# Patient Record
Sex: MALE | Race: BLACK OR AFRICAN AMERICAN | NOT HISPANIC OR LATINO | Employment: OTHER | URBAN - METROPOLITAN AREA
[De-identification: names, ages, dates, MRNs, and addresses within clinical notes are randomized per-mention and may not be internally consistent; named-entity substitution may affect disease eponyms.]

---

## 2018-03-04 ENCOUNTER — HOSPITAL ENCOUNTER (EMERGENCY)
Facility: HOSPITAL | Age: 43
Discharge: HOME | End: 2018-03-04
Attending: EMERGENCY MEDICINE | Admitting: EMERGENCY MEDICINE
Payer: COMMERCIAL

## 2018-03-04 VITALS
HEART RATE: 62 BPM | TEMPERATURE: 97.5 F | WEIGHT: 160 LBS | RESPIRATION RATE: 16 BRPM | SYSTOLIC BLOOD PRESSURE: 135 MMHG | HEIGHT: 67 IN | OXYGEN SATURATION: 100 % | BODY MASS INDEX: 25.11 KG/M2 | DIASTOLIC BLOOD PRESSURE: 65 MMHG

## 2018-03-04 DIAGNOSIS — B34.9 VIRAL SYNDROME: Primary | ICD-10-CM

## 2018-03-04 LAB
ALBUMIN SERPL BCP-MCNC: 4.1 G/DL (ref 3.5–5)
ALP SERPL-CCNC: 63 U/L (ref 46–116)
ALT SERPL W P-5'-P-CCNC: 76 U/L (ref 12–78)
ANION GAP SERPL CALCULATED.3IONS-SCNC: 12 MMOL/L (ref 4–13)
AST SERPL W P-5'-P-CCNC: 27 U/L (ref 5–45)
BACTERIA UR QL AUTO: ABNORMAL /HPF
BASOPHILS # BLD AUTO: 0 THOUSANDS/ΜL (ref 0–0.1)
BASOPHILS NFR BLD AUTO: 1 % (ref 0–1)
BILIRUB SERPL-MCNC: 0.4 MG/DL (ref 0.2–1)
BILIRUB UR QL STRIP: NEGATIVE
BUN SERPL-MCNC: 15 MG/DL (ref 5–25)
CALCIUM SERPL-MCNC: 9.3 MG/DL (ref 8.3–10.1)
CHLORIDE SERPL-SCNC: 102 MMOL/L (ref 100–108)
CLARITY UR: CLEAR
CO2 SERPL-SCNC: 24 MMOL/L (ref 21–32)
COLOR UR: ABNORMAL
CREAT SERPL-MCNC: 1.06 MG/DL (ref 0.6–1.3)
EOSINOPHIL # BLD AUTO: 0 THOUSAND/ΜL (ref 0–0.61)
EOSINOPHIL NFR BLD AUTO: 0 % (ref 0–6)
ERYTHROCYTE [DISTWIDTH] IN BLOOD BY AUTOMATED COUNT: 15.2 % (ref 11.6–15.1)
GFR SERPL CREATININE-BSD FRML MDRD: 100 ML/MIN/1.73SQ M
GLUCOSE SERPL-MCNC: 97 MG/DL (ref 65–140)
GLUCOSE UR STRIP-MCNC: NEGATIVE MG/DL
HCT VFR BLD AUTO: 45.6 % (ref 42–52)
HGB BLD-MCNC: 14.4 G/DL (ref 14–18)
HGB UR QL STRIP.AUTO: NEGATIVE
KETONES UR STRIP-MCNC: ABNORMAL MG/DL
LEUKOCYTE ESTERASE UR QL STRIP: NEGATIVE
LIPASE SERPL-CCNC: 136 U/L (ref 73–393)
LYMPHOCYTES # BLD AUTO: 1.1 THOUSANDS/ΜL (ref 0.6–4.47)
LYMPHOCYTES NFR BLD AUTO: 17 % (ref 14–44)
MAGNESIUM SERPL-MCNC: 2 MG/DL (ref 1.6–2.6)
MCH RBC QN AUTO: 24.2 PG (ref 27–31)
MCHC RBC AUTO-ENTMCNC: 31.5 G/DL (ref 31.4–37.4)
MCV RBC AUTO: 77 FL (ref 82–98)
MONOCYTES # BLD AUTO: 0.5 THOUSAND/ΜL (ref 0.17–1.22)
MONOCYTES NFR BLD AUTO: 8 % (ref 4–12)
MUCOUS THREADS UR QL AUTO: ABNORMAL
NEUTROPHILS # BLD AUTO: 4.8 THOUSANDS/ΜL (ref 1.85–7.62)
NEUTS SEG NFR BLD AUTO: 74 % (ref 43–75)
NITRITE UR QL STRIP: NEGATIVE
NON-SQ EPI CELLS URNS QL MICRO: ABNORMAL /HPF
NRBC BLD AUTO-RTO: 0 /100 WBCS
PH UR STRIP.AUTO: 5.5 [PH] (ref 5–9)
PHOSPHATE SERPL-MCNC: 3.3 MG/DL (ref 2.7–4.5)
PLATELET # BLD AUTO: 291 THOUSANDS/UL (ref 130–400)
PMV BLD AUTO: 9 FL (ref 8.9–12.7)
POTASSIUM SERPL-SCNC: 4.3 MMOL/L (ref 3.5–5.3)
PROT SERPL-MCNC: 8.4 G/DL (ref 6.4–8.2)
PROT UR STRIP-MCNC: ABNORMAL MG/DL
RBC # BLD AUTO: 5.93 MILLION/UL (ref 4.7–6.1)
RBC #/AREA URNS AUTO: ABNORMAL /HPF
SODIUM SERPL-SCNC: 138 MMOL/L (ref 136–145)
SP GR UR STRIP.AUTO: >=1.03 (ref 1–1.03)
UROBILINOGEN UR QL STRIP.AUTO: 0.2 E.U./DL
WBC # BLD AUTO: 6.4 THOUSAND/UL (ref 4.8–10.8)
WBC #/AREA URNS AUTO: ABNORMAL /HPF

## 2018-03-04 PROCEDURE — 83735 ASSAY OF MAGNESIUM: CPT | Performed by: EMERGENCY MEDICINE

## 2018-03-04 PROCEDURE — 96374 THER/PROPH/DIAG INJ IV PUSH: CPT

## 2018-03-04 PROCEDURE — 80053 COMPREHEN METABOLIC PANEL: CPT | Performed by: EMERGENCY MEDICINE

## 2018-03-04 PROCEDURE — 81001 URINALYSIS AUTO W/SCOPE: CPT | Performed by: EMERGENCY MEDICINE

## 2018-03-04 PROCEDURE — 99284 EMERGENCY DEPT VISIT MOD MDM: CPT

## 2018-03-04 PROCEDURE — 36415 COLL VENOUS BLD VENIPUNCTURE: CPT | Performed by: EMERGENCY MEDICINE

## 2018-03-04 PROCEDURE — 84100 ASSAY OF PHOSPHORUS: CPT | Performed by: EMERGENCY MEDICINE

## 2018-03-04 PROCEDURE — 83690 ASSAY OF LIPASE: CPT | Performed by: EMERGENCY MEDICINE

## 2018-03-04 PROCEDURE — 85025 COMPLETE CBC W/AUTO DIFF WBC: CPT | Performed by: EMERGENCY MEDICINE

## 2018-03-04 PROCEDURE — 96361 HYDRATE IV INFUSION ADD-ON: CPT

## 2018-03-04 RX ORDER — ONDANSETRON 2 MG/ML
4 INJECTION INTRAMUSCULAR; INTRAVENOUS ONCE
Status: COMPLETED | OUTPATIENT
Start: 2018-03-04 | End: 2018-03-04

## 2018-03-04 RX ORDER — ONDANSETRON 4 MG/1
4 TABLET, ORALLY DISINTEGRATING ORAL EVERY 6 HOURS PRN
Qty: 12 TABLET | Refills: 0 | Status: SHIPPED | OUTPATIENT
Start: 2018-03-04 | End: 2018-04-30 | Stop reason: ALTCHOICE

## 2018-03-04 RX ADMIN — ONDANSETRON 4 MG: 2 INJECTION INTRAMUSCULAR; INTRAVENOUS at 08:54

## 2018-03-04 RX ADMIN — SODIUM CHLORIDE 1000 ML: 0.9 INJECTION, SOLUTION INTRAVENOUS at 11:02

## 2018-03-04 RX ADMIN — SODIUM CHLORIDE 1000 ML: 0.9 INJECTION, SOLUTION INTRAVENOUS at 08:56

## 2018-03-04 NOTE — ED PROVIDER NOTES
History  Chief Complaint   Patient presents with    Diarrhea     Pt sts sick since thursday night with diarrhea, belching and no energy  No vomiting     20-year-old male presents to the ER with acute onset subjective temperature, chills, generalized nausea, watery diarrhea since yesterday  Patient denies any sick contacts  Patient came to the ER for further evaluation  Patient denies any shortness of breath, chest pain, headaches, weakness, dizziness  Diarrhea   Associated symptoms: no abdominal pain, no arthralgias, no fever, no headaches and no vomiting        None       History reviewed  No pertinent past medical history  History reviewed  No pertinent surgical history  History reviewed  No pertinent family history  I have reviewed and agree with the history as documented  Social History   Substance Use Topics    Smoking status: Former Smoker     Types: E-Cigarettes    Smokeless tobacco: Never Used      Comment: vapes    Alcohol use Yes        Review of Systems   Constitutional: Negative for activity change, fatigue and fever  HENT: Negative for congestion, ear discharge and sore throat  Eyes: Negative for pain and redness  Respiratory: Negative for cough, chest tightness, shortness of breath and wheezing  Cardiovascular: Negative for chest pain  Gastrointestinal: Positive for diarrhea  Negative for abdominal pain, nausea and vomiting  Endocrine: Negative for cold intolerance  Genitourinary: Negative for dysuria and urgency  Musculoskeletal: Negative for arthralgias and back pain  Neurological: Negative for dizziness, weakness and headaches  Psychiatric/Behavioral: Negative for agitation and behavioral problems         Physical Exam  ED Triage Vitals [03/04/18 0839]   Temperature Pulse Respirations Blood Pressure SpO2   97 5 °F (36 4 °C) 78 16 140/74 98 %      Temp src Heart Rate Source Patient Position - Orthostatic VS BP Location FiO2 (%)   -- -- -- -- -- Pain Score       No Pain           Orthostatic Vital Signs  Vitals:    03/04/18 0839 03/04/18 1100 03/04/18 1115 03/04/18 1130   BP: 140/74 124/71  116/71   Pulse: 78  62 62       Physical Exam   Constitutional: He is oriented to person, place, and time  He appears well-developed and well-nourished  HENT:   Head: Normocephalic and atraumatic  Nose: Nose normal    Mouth/Throat: Oropharynx is clear and moist    Eyes: Conjunctivae and EOM are normal    Neck: Normal range of motion  Neck supple  Cardiovascular: Normal rate, regular rhythm and normal heart sounds  Pulmonary/Chest: Effort normal and breath sounds normal    Abdominal: Soft  Bowel sounds are normal  He exhibits no distension  There is tenderness  Generalized mild tenderness to palpation noted throughout the abdomen  Musculoskeletal: Normal range of motion  Neurological: He is alert and oriented to person, place, and time  Skin: Skin is warm  Psychiatric: He has a normal mood and affect  His behavior is normal  Judgment and thought content normal    Nursing note and vitals reviewed        ED Medications  Medications   ondansetron (ZOFRAN) injection 4 mg (4 mg Intravenous Given 3/4/18 0854)   sodium chloride 0 9 % bolus 1,000 mL (0 mL Intravenous Stopped 3/4/18 1058)   ondansetron (ZOFRAN) injection 4 mg (4 mg Intravenous Given 3/4/18 0854)   sodium chloride 0 9 % bolus 1,000 mL (1,000 mL Intravenous New Bag 3/4/18 1102)       Diagnostic Studies  Results Reviewed     Procedure Component Value Units Date/Time    Urine Microscopic [[id number]]  (Abnormal) Collected:  03/04/18 0951    Lab Status:  Final result Specimen:  Urine from Urine, Clean Catch Updated:  03/04/18 1004     RBC, UA None Seen /hpf      WBC, UA 0-1 (A) /hpf      Epithelial Cells Occasional /hpf      Bacteria, UA None Seen /hpf      MUCOUS THREADS Innumerable    UA w Reflex to Microscopic w Reflex to Culture [[id number]]  (Abnormal) Collected:  03/04/18 0951    Lab Status: Final result Specimen:  Urine from Urine, Clean Catch Updated:  03/04/18 0957     Color, UA [place]     Clarity, UA Clear     Specific Gravity, UA >=1 030     pH, UA 5 5     Leukocytes, UA Negative     Nitrite, UA Negative     Protein, UA 30 (1+) (A) mg/dl      Glucose, UA Negative mg/dl      Ketones, UA Trace (A) mg/dl      Urobilinogen, UA 0 2 E U /dl      Bilirubin, UA Negative     Blood, UA Negative    Comprehensive metabolic panel [[id number]]  (Abnormal) Collected:  03/04/18 0856    Lab Status:  Final result Specimen:  Blood from Arm, Left Updated:  03/04/18 6141     Sodium 138 mmol/L      Potassium 4 3 mmol/L      Chloride 102 mmol/L      CO2 24 mmol/L      Anion Gap 12 mmol/L      BUN 15 mg/dL      Creatinine 1 06 mg/dL      Glucose 97 mg/dL      Calcium 9 3 mg/dL      AST 27 U/L      ALT 76 U/L      Alkaline Phosphatase 63 U/L      Total Protein 8 4 (H) g/dL      Albumin 4 1 g/dL      Total Bilirubin 0 40 mg/dL      eGFR 100 ml/min/1 73sq m     Narrative:         National Kidney Disease Education Program recommendations are as follows:  GFR calculation is accurate only with a steady state creatinine  Chronic Kidney disease less than 60 ml/min/1 73 sq  meters  Kidney failure less than 15 ml/min/1 73 sq  meters      Magnesium [[id number]]  (Normal) Collected:  03/04/18 0856    Lab Status:  Final result Specimen:  Blood from Arm, Left Updated:  03/04/18 0926     Magnesium 2 0 mg/dL     Phosphorus [[id number]]  (Normal) Collected:  03/04/18 0856    Lab Status:  Final result Specimen:  Blood from Arm, Left Updated:  03/04/18 0926     Phosphorus 3 3 mg/dL     Lipase [[id number]]  (Normal) Collected:  03/04/18 0856    Lab Status:  Final result Specimen:  Blood from Arm, Left Updated:  03/04/18 0926     Lipase 136 u/L     CBC and differential [[id number]]  (Abnormal) Collected:  03/04/18 0856    Lab Status:  Final result Specimen:  Blood from Arm, Left Updated:  03/04/18 0921     WBC 6 40 Thousand/uL      RBC 5 93 Million/uL Hemoglobin 14 4 g/dL      Hematocrit 45 6 %      MCV 77 (L) fL      MCH 24 2 (L) pg      MCHC 31 5 g/dL      RDW 15 2 (H) %      MPV 9 0 fL      Platelets 536 Thousands/uL      nRBC 0 /100 WBCs      Neutrophils Relative 74 %      Lymphocytes Relative 17 %      Monocytes Relative 8 %      Eosinophils Relative 0 %      Basophils Relative 1 %      Neutrophils Absolute 4 80 Thousands/µL      Lymphocytes Absolute 1 10 Thousands/µL      Monocytes Absolute 0 50 Thousand/µL      Eosinophils Absolute 0 00 Thousand/µL      Basophils Absolute 0 00 Thousands/µL                  No orders to display              Procedures  Procedures       Phone Contacts  ED Phone Contact    ED Course  ED Course as of Mar 04 1224   Yelitza Solitario Mar 04, 2018   1047 Patient re-examined at bedside  Patient continues to feel mild weakness  Nausea is improved  Will give another L of fluid and p o  challenge  1218 Patient re-examinedAt bedside  Patient started to feel better after 2nd leader of IV fluid  Patient tolerated ginger ale and crackers in the ER  Patient would like to go home  MDM  Number of Diagnoses or Management Options  Viral syndrome: new and requires workup  Diagnosis management comments: Obtain blood work, UA  Give IV fluids, antiemetics and reassess symptoms  Amount and/or Complexity of Data Reviewed  Clinical lab tests: ordered and reviewed  Tests in the medicine section of CPT®: ordered and reviewed  Independent visualization of images, tracings, or specimens: yes    Risk of Complications, Morbidity, and/or Mortality  General comments: No acute abnormalities noted on lab lab work  Patient given 2 L IV fluid bolus as well as Zofran which improved his symptoms  Patient tolerated ginger ale and crackers in the ER  At this point patient's symptoms are consistent with viral syndrome  Discussed supportive care and BRAT diet  Patient is discharged home on p r n   NSAIDs and Zofran for his symptoms as well as follow-up to PCP  Close return instructions given to return to the ER for any worsening symptoms  Patient agrees with discharge plan  Patient well appearing at time of discharge  Patient Progress  Patient progress: improved    CritCare Time    Disposition  Final diagnoses:   Viral syndrome     Time reflects when diagnosis was documented in both MDM as applicable and the Disposition within this note     Time User Action Codes Description Comment    3/4/2018 12:18 PM Celeste Awad Add [B34 9] Viral syndrome       ED Disposition     ED Disposition Condition Comment    Discharge  Edwin Lovell discharge to home/self care  Condition at discharge: Good        Follow-up Information     Follow up With Specialties Details Why Vladislav 81  In 3 days Please follow up with her own family doctor or call the number below if you do not have a family doctor  [address], [place] Urology  To discuss any urinary difficulty  [address]  [phone]          Patient's Medications   Discharge Prescriptions    ONDANSETRON (ZOFRAN-ODT) 4 MG DISINTEGRATING TABLET    Take 1 tablet (4 mg total) by mouth every 6 (six) hours as needed for nausea or vomiting       Start Date: 3/4/2018  End Date: --       Order Dose: 4 mg       Quantity: 12 tablet    Refills: 0     No discharge procedures on file      ED Provider  Electronically Signed by           Romie Anthony DO  03/04/18 0491

## 2018-03-04 NOTE — DISCHARGE INSTRUCTIONS
Please take a list of all of your medications and discharge paperwork with you to all of your follow-up medical visits  Please take all of your medications as directed  Please call your family doctor or return to the ER if you have increased shortness of breath, chest pain, fevers, chills, nausea, vomiting, diarrhea, or any other worsening symptoms  Viral Syndrome, Ambulatory Care   GENERAL INFORMATION:   Viral syndrome  is a term healthcare providers use for general symptoms of a viral infection that has no clear cause  Common symptoms include the following:   · Fever and chills, or a rash    · Runny or stuffy nose     · Cough, sore throat, or hoarseness     · Headache, or pain and pressure around your eyes     · Muscle aches and joint pain     · Shortness of breath or wheezing     · Abdominal pain, cramps, and diarrhea     · Nausea, vomiting, or loss of appetite  Seek immediate care for the following symptoms:   · Continued vomiting and diarrhea    · Chest pain or trouble breathing  Treatment for a viral syndrome  may include medicines to decrease fever, cough, or stuffy nose  You may also need medicines to relieve a rash, itching, or help treat the viral infection  Manage your symptoms:  Drink liquids as directed to help prevent dehydration  Ask how much liquid to drink each day and which liquids are best for you  You may need to drink an oral rehydration solution (ORS)  An ORS has the right amounts of water, salts, and sugar you need to replace body fluids  Prevent the spread of viral syndrome:   · Wash your hands often  Use soap and water  Wash your hands after you use the bathroom, change a child's diapers, or sneeze  Wash your hands before you prepare or eat food  Use a gel hand  if soap and water are not available  · Wear a mask  to help prevent spreading the virus to others  · Cook and handle food properly  Cook food completely through   Clean food preparation surfaces with a disinfectant  · Ask about vaccinations  You may need an influenza (flu) vaccine, pneumococcal vaccine, or meningococcal vaccine  These vaccines help prevent the flu, pneumonia, and meningococcal disease  Follow up with your healthcare provider as directed:  Write down your questions so you remember to ask them during your visits  CARE AGREEMENT:   You have the right to help plan your care  Learn about your health condition and how it may be treated  Discuss treatment options with your caregivers to decide what care you want to receive  You always have the right to refuse treatment  The above information is an  only  It is not intended as medical advice for individual conditions or treatments  Talk to your doctor, nurse or pharmacist before following any medical regimen to see if it is safe and effective for you  © 2014 [address] is for End User's use only and may not be sold, redistributed or otherwise used for commercial purposes  All illustrations and images included in CareNotes® are the copyrighted property of [employer]  or Colton Huang

## 2018-04-30 ENCOUNTER — OFFICE VISIT (OUTPATIENT)
Dept: FAMILY MEDICINE CLINIC | Facility: CLINIC | Age: 43
End: 2018-04-30
Payer: COMMERCIAL

## 2018-04-30 VITALS
HEIGHT: 70 IN | TEMPERATURE: 97.2 F | SYSTOLIC BLOOD PRESSURE: 132 MMHG | DIASTOLIC BLOOD PRESSURE: 80 MMHG | BODY MASS INDEX: 24.2 KG/M2 | HEART RATE: 72 BPM | OXYGEN SATURATION: 100 % | WEIGHT: 169 LBS

## 2018-04-30 DIAGNOSIS — Z23 NEED FOR TDAP VACCINATION: ICD-10-CM

## 2018-04-30 DIAGNOSIS — R53.83 OTHER FATIGUE: ICD-10-CM

## 2018-04-30 DIAGNOSIS — M54.5 LOW BACK PAIN, UNSPECIFIED BACK PAIN LATERALITY, UNSPECIFIED CHRONICITY, WITH SCIATICA PRESENCE UNSPECIFIED: ICD-10-CM

## 2018-04-30 DIAGNOSIS — M25.512 LEFT SHOULDER PAIN, UNSPECIFIED CHRONICITY: ICD-10-CM

## 2018-04-30 DIAGNOSIS — Z00.00 HEALTH CARE MAINTENANCE: Primary | ICD-10-CM

## 2018-04-30 DIAGNOSIS — Z13.220 SCREENING FOR LIPID DISORDERS: ICD-10-CM

## 2018-04-30 LAB
SL AMB  POCT GLUCOSE, UA: NORMAL
SL AMB LEUKOCYTE ESTERASE,UA: NORMAL
SL AMB POCT BILIRUBIN,UA: NORMAL
SL AMB POCT BLOOD,UA: NORMAL
SL AMB POCT CLARITY,UA: CLEAR
SL AMB POCT COLOR,UA: YELLOW
SL AMB POCT KETONES,UA: NORMAL
SL AMB POCT NITRITE,UA: NORMAL
SL AMB POCT PH,UA: 6
SL AMB POCT SPECIFIC GRAVITY,UA: 1.02
SL AMB POCT URINE PROTEIN: NORMAL
SL AMB POCT UROBILINOGEN: NORMAL

## 2018-04-30 PROCEDURE — 90471 IMMUNIZATION ADMIN: CPT

## 2018-04-30 PROCEDURE — 99396 PREV VISIT EST AGE 40-64: CPT | Performed by: FAMILY MEDICINE

## 2018-04-30 PROCEDURE — 81002 URINALYSIS NONAUTO W/O SCOPE: CPT | Performed by: FAMILY MEDICINE

## 2018-04-30 PROCEDURE — 90715 TDAP VACCINE 7 YRS/> IM: CPT

## 2018-05-01 NOTE — PROGRESS NOTES
Assessment/Plan:    No problem-specific Assessment & Plan notes found for this encounter  Diagnoses and all orders for this visit:    Health care maintenance    Other fatigue  -     Lyme Antibody Profile with reflex to WB  -     TSH, 3rd generation with T4 reflex    Screening for lipid disorders  -     Lipid panel    Left shoulder pain, unspecified chronicity  -     Ambulatory referral to Physical Therapy; Future    Low back pain, unspecified back pain laterality, unspecified chronicity, with sciatica presence unspecified  -     Ambulatory referral to Physical Therapy; Future  -     POCT urine dip  -     Urine culture  -     Chlamydia/GC amplified DNA by PCR    Need for Tdap vaccination  -     TDAP VACCINE GREATER THAN OR EQUAL TO 6YO IM          Prostate Cancer Screening:  Risks and Benefits discussed    Testicular Cancer Screening:  Risks and Benefits discussed    Colorectal Cancer Screening:  Risks and Benefits discussed      Preventing Counseling:  Advice and education were given regarding nutrition, aerobic exercises, weight bearing exercises, cardiovascular risk reduction, fall risk reduction, and age appropriate supplements  The patient was counseled regarding instructions for management, risk factor reductions, prognosis, risks and benefits of treatment options, patient and family education, and importance of compliance with treatment  No Follow-up on file  Subjective:      Patient ID: Misa Maravilla is a 44 y o  male      Visit Type: Health Maintenance    General Health:     Dental Regular visits: No    Vision Problems: No    Hearing loss: No    Life Style  Healthy Diet:  Can be improve, try to be healthy  Regular Exercise: No  Weight Concerns: No  Tobacco Use: e-cigaret  Alcohol Use: yes regularly  Drug Use: No      Reproductive Health  Sexually Active: Yes  Contraception: No        Chief Complaint   Patient presents with    Physical Exam       81-year-old male comes in for complete physical, he stated he is feeling great except feeling very tired, he stated he sleeps 8-9 hours a day, does not snore, have left shoulder pain sometimes and bilateral lower back pain, he stated he has this trouble for while, deny trauma deny injury he stated he doesn't recall when he started, Patient was recently in the ER had a blood work done all came back within normal range, the only thing that was not done was cholesterol profile, Patient during regularly and not willing to quit        The following portions of the patient's history were reviewed and updated as appropriate: allergies, current medications, past family history, past medical history, past social history, past surgical history and problem list       Review of Systems   Constitutional: Negative for activity change, appetite change, chills, diaphoresis, fever and unexpected weight change  (+) fatigue  HENT: Negative for congestion, dental problem, drooling, ear discharge, ear pain, facial swelling, hearing loss, mouth sores, nosebleeds, postnasal drip, rhinorrhea, sinus pain and sinus pressure  Eyes: Negative for photophobia, pain, discharge, redness, itching and visual disturbance  Respiratory: Negative for apnea, cough, choking, chest tightness, shortness of breath, wheezing and stridor  Cardiovascular: Negative for chest pain, palpitations and leg swelling  Gastrointestinal: Negative for abdominal distention, abdominal pain, anal bleeding, blood in stool, constipation, diarrhea, nausea, rectal pain and vomiting  Genitourinary: Negative for decreased urine volume, difficulty urinating, dyspareunia, dysuria, flank pain, hematuria and pelvic pain  Musculoskeletal: Negative for  gait problem, joint swelling, myalgias, neck pain and neck stiffness  (+) arthralgias, (+)back pain,  Skin: Negative for color change, pallor, rash and wound     Neurological: Negative for dizziness, tremors, seizures, syncope, facial asymmetry, speech difficulty, weakness, light-headedness, numbness and headaches  Psychiatric/Behavioral: Negative for agitation, behavioral problems, confusion, decreased concentration, dysphoric mood, hallucinations, sleep disturbance and suicidal ideas  The patient is not nervous/anxious  Objective:    History   Smoking Status    Former Smoker    Types: E-Cigarettes   Smokeless Tobacco    Never Used     Comment: vapes       Allergies: No Known Allergies    Vitals:  /80   Pulse 72   Temp (!) 97 2 °F (36 2 °C) (Tympanic)   Ht 5' 9 5" (1 765 m)   Wt 76 7 kg (169 lb)   SpO2 100%   BMI 24 60 kg/m²     No current outpatient prescriptions on file  No current facility-administered medications for this visit  Physical Exam   Constitutional:  oriented to person, place, and time  well-developed and well-nourished  No distress  HENT:  Normocephalic and atraumatic, Oropharynx is clear and moist  No oropharyngeal exudate  Conjunctivae and EOM are normal  Pupils are equal, round, and reactive to light  No scleral icterus  Neck: Normal range of motion  Neck supple  No JVD present  No thyromegaly present  Cardiovascular: Normal rate, regular rhythm, normal heart sounds and intact distal pulses  Exam reveals no gallop and no friction rub  No murmur heard  Pulmonary/Chest: Effort normal and breath sounds normal  No respiratory distress  no wheezes  no rales  no tenderness  Abdominal: Soft  Bowel sounds are normal  no distension and no mass  There is no tenderness  There is no rebound and no guarding  Musculoskeletal: Normal range of motion, no edema, or deformity  B/L Lumbar muscle tenderness  Neurological: alert and oriented to person, place, and time  normal reflexes  No cranial nerve deficit  normal muscle tone  Coordination normal    Skin: Skin is warm and dry  No rash noted  not diaphoretic  No erythema  Psychiatric:normal mood and affect   behavior is normal  Judgment and thought content normal    Nursing note and vitals reviewed            Results Reviewed     None        PHQ9 REVIEWED  Immunization status: dTap

## 2018-05-02 LAB
BACTERIA UR CULT: NORMAL
C TRACH RRNA SPEC QL NAA+PROBE: NEGATIVE
Lab: NO GROWTH
N GONORRHOEA RRNA SPEC QL NAA+PROBE: NEGATIVE

## 2018-05-23 ENCOUNTER — EVALUATION (OUTPATIENT)
Dept: PHYSICAL THERAPY | Facility: CLINIC | Age: 43
End: 2018-05-23
Payer: COMMERCIAL

## 2018-05-23 DIAGNOSIS — M54.5 LOW BACK PAIN, UNSPECIFIED BACK PAIN LATERALITY, UNSPECIFIED CHRONICITY, WITH SCIATICA PRESENCE UNSPECIFIED: Primary | ICD-10-CM

## 2018-05-23 DIAGNOSIS — M25.512 LEFT SHOULDER PAIN, UNSPECIFIED CHRONICITY: ICD-10-CM

## 2018-05-23 PROCEDURE — G8979 MOBILITY GOAL STATUS: HCPCS

## 2018-05-23 PROCEDURE — 97161 PT EVAL LOW COMPLEX 20 MIN: CPT

## 2018-05-23 PROCEDURE — G8978 MOBILITY CURRENT STATUS: HCPCS

## 2018-05-23 NOTE — PROGRESS NOTES
PT Evaluation     Today's date: 2018  Patient name: Oma Evans  : 1975  MRN: [id number]  Referring provider: Nesha Rogers MD  Dx:   Encounter Diagnosis     ICD-10-CM    1  Low back pain, unspecified back pain laterality, unspecified chronicity, with sciatica presence unspecified M54 5 Ambulatory referral to Physical Therapy   2  Left shoulder pain, unspecified chronicity M25 512 Ambulatory referral to Physical Therapy                  Assessment  Impairments: abnormal gait, abnormal or restricted ROM, activity intolerance, impaired balance, impaired physical strength, lacks appropriate home exercise program, pain with function and weight-bearing intolerance    Assessment details: Oma Evans is a 44 y o  male who presents to physical therapy with the follow objective deficits secondary to Low back pain, unspecified back pain laterality, unspecified chronicity, with sciatica presence unspecified  (primary encounter diagnosis)  Left shoulder pain, unspecified chronicity:  -Type I dysfunction lower thoracic upper lumbar to left  -posture dysfunction  -Type II dysfunction ERSL lower upper lumbar  -reduced flexibility bilateral LE  -symptoms with + response to repeated extension in lying  Pt would benefit from PT services to address these impairments and maximize function  PLOC and goals were discussed and agreed upon with patient      Understanding of Dx/Px/POC: good   Prognosis: good    Goals  Short term goals:    + Patient will have pain level 3/10 lumbar spine at rest(2-3 weeks)  + Patient will report a 40% improvement in symptoms (2-3 weeks)  + Patient will be independent in basic HEP 2-3 weeks   + Patient will demonstrate appropriate hip, knee, and ankle alignment with functional activities (2-3 weeks)   + patient will demonstrate proper sitting posture with verbal cuing no tactile cuing (2-3 weeks)   + patient will demonstrate no pain with sideglide bilaterally  2 weeks   + patient will demonstrate no Type I dysfunction in upper lumbar and lower thoracic (2-3 sessions)    Long term goals:  +  Patient will be independent in a comprehensive home exercise program (4-6 weeks)  +  Patient will have proper posture in clinic without  (4-6 weeks    +  Patient will report  80% improvement improvement in symptoms (4-6 weeks)  +  Patient will have full range of motion lumbar spine painfree all directiions (4-6 weeks)  +  Patient will have pain level 1/10,  lumbar spine w/ adl's (4-6 weeks)  + Patient will increase FOTO score to 65 (8 sessions)  + patient will demonstrate proper lifting mechanics independently  (4-6 weeks)  + patient will demonstrate no Type I dysfunction in upper lumbar and lower thoracic  (4-6 weeks)  + patient will report no functional limitations (4 weeks)                        Plan  Patient would benefit from: skilled PT  Planned therapy interventions: abdominal trunk stabilization, joint mobilization, manual therapy, balance, balance/weight bearing training, neuromuscular re-education, patient education, postural training, strengthening, stretching, therapeutic exercise, flexibility, gait training, functional ROM exercises, therapeutic training, home exercise program, graded exercise, body mechanics training and breathing training  Frequency: 2x week  Duration in visits: 12  Duration in weeks: 6  Treatment plan discussed with: patient  Plan details:  HEP development, stretching LE especially Hamstring, strengthening  TA and paraspinals , A/AA/PROM prn, joint mobilizations/MET to reduce Type I/II dysfunction, posture education, STM/MI as needed to reduce muscle tension left paraspinals, muscle reeducation as needed, Amaris prn PLOC discussed and agreed upon with patient  When symtpoms have decreased with assess left shoulder pain          Subjective Evaluation    History of Present Illness  Mechanism of injury: Patient reports low back pain began ~ 2-3 years ago: no specific incident  Patient reports intermittent pain over past few years  Now symptoms are more constant  Patient reports occasionally using back brace which is helpful  Denies radicular symptoms: and paraesthesias  Shoulder: pain with driving and sustained in a statis position  Pain with reaching and lifting overhead  Shoulder pain has been present for 3-4 years as well  Function:LBP: worse with sitting (no limitations), driving is worse( no limitations), difficulty removing over the range microwaves  Shoulder: worse w/ driving and sitting still for a while  reaching and lifting overhead  Recurrent probem    Quality of life: good    Pain  Current pain ratin (lumbar)  At best pain ratin (lumbar)  At worst pain ratin (lumbar)  Location: see above: lumbar  Quality: dull ache and sharp  Aggravating factors: sitting  Progression: no change    Social Support  Steps to enter house: yes  5  Stairs in house: yes   5  Lives in: multiple-level home  Lives with: spouse    Employment status: working ( and caregiver)  Hand dominance: right  Exercise history: none      Diagnostic Tests  No diagnostic tests performed  Treatments  No previous or current treatments  Patient Goals  Patient goals for therapy: decreased pain and independence with ADLs/IADLs  Patient goal: to"make sure my back is ok due to family has history of back problems"         Objective     General Comments     Lumbar Comments  Posture  Sitting:significantly slouched and in posterior pelvic tilt and forward head and shoulders     Standing: Type II curve concave to left    Gait: wnl no gait deviations    Palpation: + tenderness in area of L5/S1 spinous process     Functional movements  Squat: wnl no pain: increased forward translation of knees over toes  SLS: left: 40  sec Right : 40 sec   Bridging: lifts about 50% of motion, needs cuing to achieve full motion: no increase in low back pain    Dermatomes: NT    Myotomes  Hip, knee and ankle 4/5: able to walk on heels and toes         Lumbar ROM  Flexion: wnl + muscle bellies more prominent on  Left with forward flexion  Extension: + pain in standing, prone: full ROM no pain  Side glide: Right: wnl reduced pain, left: wnl no effect on symptoms  Type 2 dysfunction: found in flexion: ERSL: corrected with MET     Repeated motions  KALEB: increase pain NWAR   REIL: prone: no pain before or after press ups  SG Right: reduces pain NBAR    Flexibility   Hamstring: Right: fair + Left: fair  Hip flexors:Right: Nt Left: Nt    Quads: Right: fair +  Left: fair +     Slump test:  Right: Nt Left: Nt        Precautions standard    Specialty Daily Treatment Diary      Patient Goal:  to"make sure my back is ok due to family has history of back problems"   Manual 5/23/18       STM left lumbar paraspinals NV       L/S p-a mobs  NV       Man Flexibility hamstring        MET Type II dysfunction: upper lumbar performed         Total time:  5 min               Exercise Diary         Rec bike/treadmill/  elliptical         TA activation        Ball squeeze        Clamshells        LTR        Bridges                Sitting posture instruct       Prone press ups instruct       EIS instruct               Type II dysfunction  Concave on left: hip hike on left NV       sidelying leg lift off side of table on right and lift NV                       Total time:                 Modalities        Ice        MH        Total time:

## 2018-05-29 ENCOUNTER — OFFICE VISIT (OUTPATIENT)
Dept: PHYSICAL THERAPY | Facility: CLINIC | Age: 43
End: 2018-05-29
Payer: COMMERCIAL

## 2018-05-29 DIAGNOSIS — M54.5 LOW BACK PAIN, UNSPECIFIED BACK PAIN LATERALITY, UNSPECIFIED CHRONICITY, WITH SCIATICA PRESENCE UNSPECIFIED: ICD-10-CM

## 2018-05-29 DIAGNOSIS — M25.512 LEFT SHOULDER PAIN, UNSPECIFIED CHRONICITY: Primary | ICD-10-CM

## 2018-05-29 PROCEDURE — 97110 THERAPEUTIC EXERCISES: CPT

## 2018-05-29 NOTE — LETTER
August 15, 2018    [address]  [address] [facility]    Patient: Miguel Ángel Gutierrez   YOB: 1975   Date of Visit: 2018     Encounter Diagnosis     ICD-10-CM    1  Left shoulder pain, unspecified chronicity M25 512    2  Low back pain, unspecified back pain laterality, unspecified chronicity, with sciatica presence unspecified M54 5        Dear Dr Julio Cesar Kaplan:    Please review the attached addendum from [facility] recent visit  If you have any questions or concerns, please don't hesitate to call  Sincerely,    Ping Peoples, PT                        Ashley Hoskins MD  [employer]  [address] [place] [id number]  VIA In Basket          Daily Note     Today's date: 2018  Patient name: Miguel Ángel Gutierrez  : 1975  MRN: [id number]  Referring provider: Mona Bauer MD  Dx:   Encounter Diagnosis     ICD-10-CM    1  Left shoulder pain, unspecified chronicity M25 512    2  Low back pain, unspecified back pain laterality, unspecified chronicity, with sciatica presence unspecified M54 5                   Subjective: Beverley Barbour reports that his pain level entering today 6/10    States he has not had a chance to perform HEP    Objective: See treatment diary below  Precautions standard    Specialty Daily Treatment Diary      Patient Goal:  to"make sure my back is ok due to family has history of back problems"   Manual 18      STM left lumbar paraspinals NV       L/S p-a mobs  NV performed        Man Flexibility hamstring        MET Type II dysfunction: upper lumbar  performed   FRSR assessment/ MET correction   7 sec x 3      Total time:  5 min 7 min              Exercise Diary         Rec bike/treadmill/  elliptical   nustep lv 3 5 min       TA activation        Ball squeeze        Clamshells        LTR        Bridges  5 sec 10 x              Sitting posture instruct reviewed      Prone press ups instruct 2x10        EIS instruct 2 x5              Type I dysfunction  Concave on left: hip hike on left NV Not present      sidelying leg lift off side of table on right and lift NV Not performed          Hip hinge: 10 x         Sit/stand: 10 x 2  from chair      Total time:   20 min (1:1)              Modalities        Ice        MH        Total time:  Not performed              Assessment: able to perform press up pain free  + pain with sit/stand transfers  + FRSR noted at ~ L4, no type II dysfunction noted this am  Patient had good response to repeated press p  Was able to perform hip hinge and sit/stand with increased cuing: but when performed properly, patient was pain free  Patient exited with no pain  Plan: Progress treatment as tolerated  continue to assess Type I and II dysfunction      8/15/18: on 7/9/18:  spoke with patient due to patient not attending scheduled appointments  Patient stated he was in the process of changing insurances and would call to reschedule appointments  As of todays date, patient has not contacted clinic therefore, PT services are being discharged  D/C status and goal status is unknown

## 2018-05-29 NOTE — PROGRESS NOTES
Daily Note     Today's date: 2018  Patient name: Margaux Powell  : 1975  MRN: [id number]  Referring provider: Makenna Sunshine MD  Dx:   Encounter Diagnosis     ICD-10-CM    1  Left shoulder pain, unspecified chronicity M25 512    2  Low back pain, unspecified back pain laterality, unspecified chronicity, with sciatica presence unspecified M54 5                   Subjective: Ronel Rowan reports that his pain level entering today 6/10   States he has not had a chance to perform HEP    Objective: See treatment diary below  Precautions standard    Specialty Daily Treatment Diary      Patient Goal:  to"make sure my back is ok due to family has history of back problems"   Manual 18      STM left lumbar paraspinals NV       L/S p-a mobs  NV performed        Man Flexibility hamstring        MET Type II dysfunction: upper lumbar  performed   FRSR assessment/ MET correction   7 sec x 3      Total time:  5 min 7 min              Exercise Diary         Rec bike/treadmill/  elliptical   nustep lv 3 5 min       TA activation        Ball squeeze        Clamshells        LTR        Bridges  5 sec 10 x              Sitting posture instruct reviewed      Prone press ups instruct 2x10        EIS instruct 2 x5              Type I dysfunction  Concave on left: hip hike on left NV Not present      sidelying leg lift off side of table on right and lift NV Not performed          Hip hinge: 10 x         Sit/stand: 10 x 2  from chair      Total time:   20 min (1:1)              Modalities        Ice        MH        Total time:  Not performed              Assessment: able to perform press up pain free  + pain with sit/stand transfers  + FRSR noted at ~ L4, no type II dysfunction noted this am  Patient had good response to repeated press p  Was able to perform hip hinge and sit/stand with increased cuing: but when performed properly, patient was pain free  Patient exited with no pain      Plan: Progress treatment as tolerated  continue to assess Type I and II dysfunction      8/15/18: on 7/9/18:  spoke with patient due to patient not attending scheduled appointments  Patient stated he was in the process of changing insurances and would call to reschedule appointments  As of todays date, patient has not contacted clinic therefore, PT services are being discharged  D/C status and goal status is unknown

## 2018-07-09 ENCOUNTER — TELEPHONE (OUTPATIENT)
Dept: PHYSICAL THERAPY | Facility: CLINIC | Age: 43
End: 2018-07-09

## 2018-07-09 NOTE — TELEPHONE ENCOUNTER
kacie bridges phoned patient due to lack of attendance in physical therapy  Patient stated he was in the process of switching insurances   Will call when he is ready to return to PT

## 2018-09-24 ENCOUNTER — CLINICAL SUPPORT (OUTPATIENT)
Dept: FAMILY MEDICINE CLINIC | Facility: CLINIC | Age: 43
End: 2018-09-24
Payer: COMMERCIAL

## 2018-09-24 DIAGNOSIS — Z11.1 PPD SCREENING TEST: Primary | ICD-10-CM

## 2018-09-24 PROCEDURE — 99211 OFF/OP EST MAY X REQ PHY/QHP: CPT

## 2018-09-24 PROCEDURE — 86580 TB INTRADERMAL TEST: CPT

## 2018-09-26 ENCOUNTER — CLINICAL SUPPORT (OUTPATIENT)
Dept: FAMILY MEDICINE CLINIC | Facility: CLINIC | Age: 43
End: 2018-09-26
Payer: COMMERCIAL

## 2018-09-26 DIAGNOSIS — Z11.1 PPD SCREENING TEST: Primary | ICD-10-CM

## 2018-09-26 LAB
INDURATION: 0 MM
TB SKIN TEST: NEGATIVE

## 2018-09-26 PROCEDURE — 86580 TB INTRADERMAL TEST: CPT | Performed by: FAMILY MEDICINE

## 2018-10-11 ENCOUNTER — OFFICE VISIT (OUTPATIENT)
Dept: FAMILY MEDICINE CLINIC | Facility: CLINIC | Age: 43
End: 2018-10-11
Payer: COMMERCIAL

## 2018-10-11 VITALS
RESPIRATION RATE: 16 BRPM | DIASTOLIC BLOOD PRESSURE: 72 MMHG | WEIGHT: 165.2 LBS | BODY MASS INDEX: 24.05 KG/M2 | HEART RATE: 60 BPM | SYSTOLIC BLOOD PRESSURE: 110 MMHG | TEMPERATURE: 97.8 F

## 2018-10-11 DIAGNOSIS — R82.90 URINE ABNORMALITY: ICD-10-CM

## 2018-10-11 DIAGNOSIS — Z71.2 ENCOUNTER TO DISCUSS TEST RESULTS: Primary | ICD-10-CM

## 2018-10-11 PROCEDURE — 99213 OFFICE O/P EST LOW 20 MIN: CPT | Performed by: FAMILY MEDICINE

## 2018-10-11 NOTE — PROGRESS NOTES
Assessment:      Encounter to discuss test results    Urine Abnormality     Plan:  Plan:     · Counseled patient on normal test results  · Discussed possible prostate etiology, however patient declines genital exam, rectal exam or any further workup  · Offered referral to urology, however patient does not want to be scoped, so he declined the referral as well  · Patient will follow up as needed  All patient questions & concerns were addressed  The patient agrees with his treatment plan  RTO prn  Subjective:      Adams Man is a 44 y o  male who complains of uncomfortable urination with straining for several months  He is here to discuss test results ordered on a previous visit for this complaint  Patient denies dysuria, hematuria, hesitancy, weak stream, urgency, incontinence, pubic pain, or any discharge  He also denies back pain, congestion, cough, fever, headache, rhinitis, sorethroat and stomach ache  Patient does not have a history of recurrent UTI  Patient does not have a history of pyelonephritis  The patient previously had labwork done, including urine dip, culture, and GC/Chlamydia test  All of these were negative  He seems primarily concerned with the test results and denies any other complaints  The following portions of the patient's history were reviewed and updated as appropriate: allergies, current medications, past family history, past medical history, past social history, past surgical history and problem list         Review of Systems  Pertinent items are noted in HPI        Objective:      /72   Pulse 60   Temp 97 8 °F (36 6 °C)   Resp 16   Wt 74 9 kg (165 lb 3 2 oz)   BMI 24 05 kg/m²   General: alert and oriented, in no acute distress and cooperative   Abdomen: soft, non-tender, without masses or organomegaly     Back: spine nontender, CVA tenderness absent   : defer exam       Stevenson Cooks, DO  10/12/18  11:53 AM    Some portions of this record may have been generated with voice recognition software  There may be translation, syntax, or grammatical errors  Occasional wrong word or "sound-a-like" substitutions may have occurred due to the inherent limitations of the voice recognition software  Read the chart carefully and recognize, using context, where substations may have occurred   If you have any questions, please contact the dictating provider for clarification or correction, as needed

## 2019-01-27 ENCOUNTER — OFFICE VISIT (OUTPATIENT)
Dept: URGENT CARE | Facility: CLINIC | Age: 44
End: 2019-01-27
Payer: COMMERCIAL

## 2019-01-27 VITALS
BODY MASS INDEX: 26.34 KG/M2 | RESPIRATION RATE: 18 BRPM | WEIGHT: 167.8 LBS | HEART RATE: 76 BPM | TEMPERATURE: 98.5 F | SYSTOLIC BLOOD PRESSURE: 120 MMHG | HEIGHT: 67 IN | OXYGEN SATURATION: 99 % | DIASTOLIC BLOOD PRESSURE: 70 MMHG

## 2019-01-27 DIAGNOSIS — H10.33 ACUTE CONJUNCTIVITIS OF BOTH EYES, UNSPECIFIED ACUTE CONJUNCTIVITIS TYPE: Primary | ICD-10-CM

## 2019-01-27 DIAGNOSIS — J06.9 VIRAL URI: ICD-10-CM

## 2019-01-27 PROCEDURE — 99213 OFFICE O/P EST LOW 20 MIN: CPT | Performed by: PHYSICIAN ASSISTANT

## 2019-01-27 RX ORDER — TOBRAMYCIN 3 MG/ML
1 SOLUTION/ DROPS OPHTHALMIC
Qty: 5 ML | Refills: 0 | Status: SHIPPED | OUTPATIENT
Start: 2019-01-27 | End: 2020-02-23

## 2019-01-27 RX ORDER — LANOLIN ALCOHOL/MO/W.PET/CERES
1 CREAM (GRAM) TOPICAL 3 TIMES DAILY
COMMUNITY
End: 2020-11-22

## 2019-01-27 RX ORDER — ELECTROLYTES/DEXTROSE
SOLUTION, ORAL ORAL DAILY
COMMUNITY
End: 2020-11-22

## 2019-01-27 NOTE — PATIENT INSTRUCTIONS
Conjunctivitis   WHAT YOU SHOULD KNOW:   Conjunctivitis, or pink eye, is inflammation of your conjunctiva  The conjunctiva is a thin tissue that covers the front of your eye and the back of your eyelids  The conjunctiva helps protect your eye and keep it moist         INSTRUCTIONS:   Medicines:   · Allergy medicine: This medicine helps decrease itchy, red, swollen eyes caused by allergies  It may be given as a pill, eye drops, or nasal spray  · Antibiotics:  You will need antibiotics if your conjunctivitis is caused by bacteria  This medicine may be given as eye drops or eye ointment  · Steroid medicine: This medicine helps decrease inflammation  It may be given as a pill, eye drops, or nasal spray  · Take your medicine as directed  Call your healthcare provider if you think your medicine is not helping or if you have side effects  Tell him if you are allergic to any medicine  Keep a list of the medicines, vitamins, and herbs you take  Include the amounts, and when and why you take them  Bring the list or the pill bottles to follow-up visits  Carry your medicine list with you in case of an emergency  Follow up with your primary healthcare provider as directed: You may need to return for more tests on your eyes  These will help your primary healthcare provider check for eye damage  Write down your questions so you remember to ask them during your visits  Avoid the spread of conjunctivitis:   · Wash your hands often:  Wash your hands before you touch your eyes  Also wash your hands before you prepare or eat food and after you use the bathroom or change a diaper  · Avoid allergens:  Try to avoid the things that cause your allergies, such as pets, dust, or grass  · Avoid contact:  Do not share towels or washcloths  Try to stay away from others as much as possible  Ask when you can return to work or school  · Throw away eye makeup:  Throw away mascara and other eye makeup    Manage your symptoms:  · Apply a cool compress:  Wet a washcloth with cold water and place it on your eye  This will help decrease swelling  · Use eye drops:  Eye drops, or artificial tears, can be bought without a doctor's order  They help keep your eye moist     · Do not wear contact lenses: They can irritate your eye  Throw away the pair you are using and ask when you can wear them again  Use a new pair of lenses when your primary healthcare provider says it is okay  · Flush your eye:  You may need to flush your eye with saline to help decrease your symptoms  Ask for more information on how to flush your eye  Contact your primary healthcare provider if:   · Your eyesight becomes blurry  · You have tiny bumps or spots of blood on your eye  · You have questions or concerns about your condition or care  Return to the emergency department if:   · The swelling in your eye gets worse, even after treatment  · Your vision suddenly becomes worse or you cannot see at all  · Your eye begins to bleed  © 2014 [address] is for End User's use only and may not be sold, redistributed or otherwise used for commercial purposes  All illustrations and images included in CareNotes® are the copyrighted property of [employer]  or [employer]  The above information is an  only  It is not intended as medical advice for individual conditions or treatments  Talk to your doctor, nurse or pharmacist before following any medical regimen to see if it is safe and effective for you  Upper Respiratory Infection   WHAT YOU NEED TO KNOW:   An upper respiratory infection is also called the common cold  It is an infection that can affect your nose, throat, ears, and sinuses  For healthy people, the common cold is usually not serious and does not need special treatment  Cold symptoms are usually worst for the first 3 to 5 days  Most people get better in 7 to 14 days   You may continue to cough for 2 to 3 weeks  Colds are caused by viruses and do not get better with antibiotics  DISCHARGE INSTRUCTIONS:   Return to the emergency department if:   · You have chest pain or trouble breathing  Contact your healthcare provider if:   · You have a fever over 102ºF (39°C)  · Your sore throat gets worse or you see white or yellow spots in your throat  · Your symptoms get worse after 3 to 5 days or your cold is not better in 14 days  · You have a rash anywhere on your skin  · You have large, tender lumps in your neck  · You have thick, green or yellow drainage from your nose  · You cough up thick yellow, green, or bloody mucus  · You have vomiting for more than 24 hours and cannot keep fluids down  · You have a bad earache  · You have questions or concerns about your condition or care  Medicines: You may need any of the following:  · Decongestants  help reduce nasal congestion and help you breathe more easily  If you take decongestant pills, they may make you feel restless or cause problems with your sleep  Do not use decongestant sprays for more than a few days  · Cough suppressants  help reduce coughing  Ask your healthcare provider which type of cough medicine is best for you  · NSAIDs , such as ibuprofen, help decrease swelling, pain, and fever  NSAIDs can cause stomach bleeding or kidney problems in certain people  If you take blood thinner medicine, always ask your healthcare provider if NSAIDs are safe for you  Always read the medicine label and follow directions  · Acetaminophen  decreases pain and fever  It is available without a doctor's order  Ask how much to take and how often to take it  Follow directions  Read the labels of all other medicines you are using to see if they also contain acetaminophen, or ask your doctor or pharmacist  Acetaminophen can cause liver damage if not taken correctly   Do not use more than 4 grams (4,000 milligrams) total of acetaminophen in one day  · Take your medicine as directed  Contact your healthcare provider if you think your medicine is not helping or if you have side effects  Tell him or her if you are allergic to any medicine  Keep a list of the medicines, vitamins, and herbs you take  Include the amounts, and when and why you take them  Bring the list or the pill bottles to follow-up visits  Carry your medicine list with you in case of an emergency  Follow up with your healthcare provider as directed:  Write down your questions so you remember to ask them during your visits  Self-care:   · Rest as much as possible  Slowly start to do more each day  · Drink more liquids as directed  Liquids will help thin and loosen mucus so you can cough it up  Liquids will also help prevent dehydration  Liquids that help prevent dehydration include water, fruit juice, and broth  Do not drink liquids that contain caffeine  Caffeine can increase your risk for dehydration  Ask your healthcare provider how much liquid to drink each day  · Soothe a sore throat  Gargle with warm salt water  This helps your sore throat feel better  Make salt water by dissolving ¼ teaspoon salt in 1 cup warm water  You may also suck on hard candy or throat lozenges  You may use a sore throat spray  · Use a humidifier or vaporizer  Use a cool mist humidifier or a vaporizer to increase air moisture in your home  This may make it easier for you to breathe and help decrease your cough  · Use saline nasal drops as directed  These help relieve congestion  · Apply petroleum-based jelly around the outside of your nostrils  This can decrease irritation from blowing your nose  · Do not smoke  Nicotine and other chemicals in cigarettes and cigars can make your symptoms worse  They can also cause infections such as bronchitis or pneumonia  Ask your healthcare provider for information if you currently smoke and need help to quit  E-cigarettes or smokeless tobacco still contain nicotine  Talk to your healthcare provider before you use these products  Prevent spreading your cold to others:   · Try to stay away from other people during the first 2 to 3 days of your cold when it is more easily spread  · Do not share food or drinks  · Do not share hand towels with household members  · Wash your hands often, especially after you blow your nose  Turn away from other people and cover your mouth and nose with a tissue when you sneeze or cough  © 2017 [address] [facility] Information is for End User's use only and may not be sold, redistributed or otherwise used for commercial purposes  All illustrations and images included in CareNotes® are the copyrighted property of [employer] M [employer] Inc  or [employer]  The above information is an  only  It is not intended as medical advice for individual conditions or treatments  Talk to your doctor, nurse or pharmacist before following any medical regimen to see if it is safe and effective for you

## 2019-01-27 NOTE — LETTER
January 27, 2019     Patient: Garrett Segovia   YOB: 1975   Date of Visit: 1/27/2019       To Whom it May Concern:    Garrett Segovia was seen in my clinic on 1/27/2019  He is being treated for acute bilateral conjunctivitis and is considered contagious for the first 24 hours while on drops  If you have any questions or concerns, please don't hesitate to call           Sincerely,          Elayne Bernal PA-C        CC: No Recipients

## 2019-01-27 NOTE — PROGRESS NOTES
[facility]        NAME: Daniel Sow is a 44 y o  male  : 1975    MRN: [id number]  DATE: 2019  TIME: 11:06 AM    Assessment and Plan   Acute conjunctivitis of both eyes, unspecified acute conjunctivitis type [H10 33]  1  Acute conjunctivitis of both eyes, unspecified acute conjunctivitis type  tobramycin (TOBREX) 0 3 % SOLN   2  Viral URI           Patient Instructions     Discussed condition with pt  He has B/L conjunctivitis which I will treat with Tobrex drops and reviewed precautions regarding pinkeye as well as need to apply frequent warm compresses to the eyelids  He also has an acute viral URI  I rec hydration, rest, discussed OTC cold meds, continuing Mucinex, and observation  Follow up with PCP in 3-5 days  Proceed to  ER if symptoms worsen  Chief Complaint     Chief Complaint   Patient presents with    Conjunctivitis     x 2 days - b/l eye redness, swelling of upper and lower lids with green exudate and crustiness  Has URI with nasal congestion  and sore throat - no cough  History of Present Illness       Pt presents with 2 day history of B/L eye irritation, crusting, drainage, itchy eyes  Denies FB/trauma, visual changes  Does not wear contact lenses  He also reports for 1+ week history of cold symptoms including nasal congestion, ST, occ chills but no fever, cough, N/V/D  He has taken Mucinex  Denies hx of asthma/allergies  He vapes  Does not receive the flu shot  Review of Systems   Review of Systems   Constitutional: Negative  HENT: Positive for congestion and sore throat  Eyes: Positive for discharge, redness and itching  Negative for photophobia, pain and visual disturbance  Pertinent positives are B/L    Respiratory: Negative  Cardiovascular: Negative  Gastrointestinal: Negative  Genitourinary: Negative            Current Medications       Current Outpatient Prescriptions:     glucosamine-chondroitin 500-400 MG tablet, Take 1 tablet by mouth 3 (three) times a day, Disp: , Rfl:     Multiple Vitamins-Minerals (EYE VITAMINS PO), Take by mouth daily, Disp: , Rfl:     Multiple Vitamins-Minerals (HAIR SKIN AND NAILS FORMULA PO), Take by mouth daily, Disp: , Rfl:     Multiple Vitamins-Minerals (MULTIVITAMIN ADULT) TABS, Take by mouth daily, Disp: , Rfl:     tobramycin (TOBREX) 0 3 % SOLN, Administer 1 drop to both eyes every 4 (four) hours while awake, Disp: 5 mL, Rfl: 0    Current Allergies     Allergies as of 01/27/2019    (No Known Allergies)            The following portions of the patient's history were reviewed and updated as appropriate: allergies, current medications, past family history, past medical history, past social history, past surgical history and problem list      Past Medical History:   Diagnosis Date    Anemia        Past Surgical History:   Procedure Laterality Date    NO PAST SURGERIES         No family history on file  Medications have been verified  Objective   /70 (BP Location: Left arm, Patient Position: Sitting, Cuff Size: Standard)   Pulse 76   Temp 98 5 °F (36 9 °C) (Tympanic)   Resp 18   Ht 5' 7" (1 702 m)   Wt 76 1 kg (167 lb 12 8 oz)   SpO2 99%   BMI 26 28 kg/m²        Physical Exam     Physical Exam   Constitutional: He is oriented to person, place, and time  He appears well-developed and well-nourished  No distress  HENT:   Right Ear: Hearing, tympanic membrane, external ear and ear canal normal    Left Ear: Hearing, tympanic membrane, external ear and ear canal normal    Nose: Mucosal edema (B/L boggy erythematous turbinates) present  Mouth/Throat: Mucous membranes are normal  Posterior oropharyngeal erythema (PND) present  No oropharyngeal exudate  Eyes:   B/L conjunctival injection, mild lid edema, crusting on lid margins, and watery discharge  Mild photophobia noted  No FB noted on flipping of lids  Neck: Neck supple     Cardiovascular: Normal rate, regular rhythm and normal heart sounds  Pulmonary/Chest: Effort normal and breath sounds normal    Lymphadenopathy:     He has no cervical adenopathy  Neurological: He is alert and oriented to person, place, and time  Psychiatric: He has a normal mood and affect  Vitals reviewed

## 2019-02-17 ENCOUNTER — HOSPITAL ENCOUNTER (EMERGENCY)
Facility: HOSPITAL | Age: 44
Discharge: HOME | End: 2019-02-17
Attending: EMERGENCY MEDICINE | Admitting: EMERGENCY MEDICINE
Payer: COMMERCIAL

## 2019-02-17 VITALS
TEMPERATURE: 97.8 F | BODY MASS INDEX: 25.11 KG/M2 | WEIGHT: 160 LBS | RESPIRATION RATE: 18 BRPM | HEIGHT: 67 IN | DIASTOLIC BLOOD PRESSURE: 69 MMHG | OXYGEN SATURATION: 99 % | SYSTOLIC BLOOD PRESSURE: 132 MMHG | HEART RATE: 60 BPM

## 2019-02-17 DIAGNOSIS — J02.9 PHARYNGITIS: Primary | ICD-10-CM

## 2019-02-17 LAB — S PYO AG THROAT QL: NEGATIVE

## 2019-02-17 PROCEDURE — 86308 HETEROPHILE ANTIBODY SCREEN: CPT | Performed by: EMERGENCY MEDICINE

## 2019-02-17 PROCEDURE — 87430 STREP A AG IA: CPT | Performed by: EMERGENCY MEDICINE

## 2019-02-17 PROCEDURE — 99283 EMERGENCY DEPT VISIT LOW MDM: CPT

## 2019-02-17 PROCEDURE — 36415 COLL VENOUS BLD VENIPUNCTURE: CPT | Performed by: EMERGENCY MEDICINE

## 2019-02-17 RX ADMIN — LIDOCAINE HYDROCHLORIDE 10 ML: 20 SOLUTION ORAL; TOPICAL at 20:41

## 2019-02-17 NOTE — ED PROVIDER NOTES
History  Chief Complaint   Patient presents with    Sore Throat     Patient comes today due to sore throat that has been ongoing for some time,has been using chloroseptic lozengers and was seen a few weeks ago by an urgent care for same but throat is still hurting     Patient states he has been no for about a month  He had a sore throat which was associated with bilateral conjunctivitis and nasal congestion  He was seen at a urgent center diagnosed with viral syndrome and released  Patient states that is conjunctivitis has improved however he continues with a sore throat and painful swallowing  He has not had a fever  He has no cough  Patient states his significant other developed a sore throat but she cleared up quickly  He has no trouble speaking and no voice change          Prior to Admission Medications   Prescriptions Last Dose Informant Patient Reported? Taking? Multiple Vitamins-Minerals (EYE VITAMINS PO)   Yes Yes   Sig: Take by mouth daily   Multiple Vitamins-Minerals (HAIR SKIN AND NAILS FORMULA PO)   Yes Yes   Sig: Take by mouth daily   Multiple Vitamins-Minerals (MULTIVITAMIN ADULT) TABS   Yes Yes   Sig: Take by mouth daily   glucosamine-chondroitin 500-400 MG tablet   Yes Yes   Sig: Take 1 tablet by mouth 3 (three) times a day   tobramycin (TOBREX) 0 3 % SOLN   No Yes   Sig: Administer 1 drop to both eyes every 4 (four) hours while awake      Facility-Administered Medications: None       Past Medical History:   Diagnosis Date    Anemia        Past Surgical History:   Procedure Laterality Date    NO PAST SURGERIES         History reviewed  No pertinent family history  I have reviewed and agree with the history as documented  Social History     Tobacco Use    Smoking status: Current Every Day Smoker     Types: E-Cigarettes    Smokeless tobacco: Never Used    Tobacco comment: vapes   Substance Use Topics    Alcohol use:  Yes     Alcohol/week: 0 6 oz     Types: 1 Standard drinks or equivalent per week    Drug use: No        Review of Systems   Constitutional: Negative for chills and fever  HENT: Positive for congestion and sore throat  Negative for trouble swallowing and voice change  Eyes: Negative for visual disturbance  Respiratory: Negative for cough  Cardiovascular: Negative for chest pain  Gastrointestinal: Negative for abdominal pain  Genitourinary: Negative for dysuria  Musculoskeletal: Negative for arthralgias and back pain  Skin: Negative for rash  Neurological: Negative for dizziness, light-headedness and headaches  Hematological: Does not bruise/bleed easily  Psychiatric/Behavioral: Negative for confusion  All other systems reviewed and are negative  Physical Exam  Physical Exam   Constitutional: He is oriented to person, place, and time  He appears well-developed and well-nourished  HENT:   Head: Normocephalic and atraumatic  Right Ear: Tympanic membrane and ear canal normal    Left Ear: Tympanic membrane and ear canal normal    Mouth/Throat: Mucous membranes are normal  Posterior oropharyngeal erythema present  No oropharyngeal exudate  Tonsils are 1+ on the right  Tonsils are 1+ on the left  No tonsillar exudate  Eyes: Pupils are equal, round, and reactive to light  EOM are normal    Neck: Normal range of motion  Neck supple  Cardiovascular: Normal rate, regular rhythm and normal heart sounds  Pulmonary/Chest: Effort normal and breath sounds normal    Abdominal: Soft  Lymphadenopathy:     He has no cervical adenopathy  Neurological: He is alert and oriented to person, place, and time  Skin: Skin is warm and dry  Psychiatric: He has a normal mood and affect  His behavior is normal    Nursing note and vitals reviewed        Vital Signs  ED Triage Vitals [02/17/19 1850]   Temperature Pulse Respirations Blood Pressure SpO2   97 8 °F (36 6 °C) 60 18 132/69 99 %      Temp Source Heart Rate Source Patient Position - Orthostatic VS BP Location FiO2 (%)   Tympanic Monitor Sitting Right arm --      Pain Score       --           Vitals:    02/17/19 1850   BP: 132/69   Pulse: 60   Patient Position - Orthostatic VS: Sitting       Visual Acuity      ED Medications  Medications   al mag oxide-diphenhydramine-lidocaine viscous (MAGIC MOUTHWASH) suspension 10 mL (has no administration in time range)       Diagnostic Studies  Results Reviewed     Procedure Component Value Units Date/Time    Rapid Strep A Screen Only, Adults [[id number]]  (Normal) Collected:  02/17/19 1954    Lab Status:  Final result Specimen:  Throat Updated:  02/17/19 2006     Rapid Strep A Screen Negative    Mononucleosis screen [[id number]] Collected:  02/17/19 1954    Lab Status: In process Specimen:  Blood from Arm, Left Updated:  02/17/19 1957                 No orders to display              Procedures  Procedures       Phone Contacts  ED Phone Contact    ED Course                               MDM  Number of Diagnoses or Management Options  Diagnosis management comments: Patient's history of strep as a child  Signs and symptoms are more consistent with viral infection however  I will also check for EBV      Disposition  Final diagnoses:   Pharyngitis     Time reflects when diagnosis was documented in both MDM as applicable and the Disposition within this note     Time User Action Codes Description Comment    2/17/2019  8:32 PM Paul Cornelius Add [J02 9] Pharyngitis       ED Disposition     ED Disposition Condition Date/Time Comment    Discharge Stable Sun Feb 17, 2019  8:32 PM Ayaka Vidal discharge to home/self care              Follow-up Information     Follow up With Specialties Details Why Contact Tyron Martinez MD Family Medicine Schedule an appointment as soon as possible for a visit in 1 day As needed [employer] [place] [facility]  [address] [facility]  [phone]            Patient's Medications   Discharge Prescriptions    AL MAG OXIDE-DIPHENHYDRAMINE-LIDOCAINE VISCOUS (MAGIC MOUTHWASH) 1:1:1 SUSPENSION    Swish and spit 10 mL every 4 (four) hours as needed for mouth pain or discomfort       Start Date: 2/17/2019 End Date: --       Order Dose: 10 mL       Quantity: 180 mL    Refills: 0     No discharge procedures on file      ED Provider  Electronically Signed by           Rebecca Draper MD  02/17/19 9966

## 2019-02-18 LAB — HETEROPH AB SER QL: NEGATIVE

## 2019-05-16 ENCOUNTER — OFFICE VISIT (OUTPATIENT)
Dept: FAMILY MEDICINE CLINIC | Facility: CLINIC | Age: 44
End: 2019-05-16
Payer: COMMERCIAL

## 2019-05-16 VITALS
SYSTOLIC BLOOD PRESSURE: 130 MMHG | DIASTOLIC BLOOD PRESSURE: 70 MMHG | TEMPERATURE: 97.6 F | BODY MASS INDEX: 26.44 KG/M2 | OXYGEN SATURATION: 99 % | WEIGHT: 168.8 LBS | RESPIRATION RATE: 18 BRPM | HEART RATE: 75 BPM

## 2019-05-16 DIAGNOSIS — S63.502A SPRAIN OF LEFT WRIST, INITIAL ENCOUNTER: Primary | ICD-10-CM

## 2019-05-16 PROCEDURE — 99213 OFFICE O/P EST LOW 20 MIN: CPT | Performed by: FAMILY MEDICINE

## 2019-07-16 ENCOUNTER — OFFICE VISIT (OUTPATIENT)
Dept: FAMILY MEDICINE CLINIC | Facility: CLINIC | Age: 44
End: 2019-07-16
Payer: COMMERCIAL

## 2019-07-16 VITALS
OXYGEN SATURATION: 99 % | WEIGHT: 163 LBS | HEART RATE: 79 BPM | SYSTOLIC BLOOD PRESSURE: 110 MMHG | BODY MASS INDEX: 25.58 KG/M2 | DIASTOLIC BLOOD PRESSURE: 80 MMHG | HEIGHT: 67 IN

## 2019-07-16 DIAGNOSIS — M19.039 WRIST ARTHRITIS: ICD-10-CM

## 2019-07-16 DIAGNOSIS — M24.831: ICD-10-CM

## 2019-07-16 DIAGNOSIS — M24.832: Primary | ICD-10-CM

## 2019-07-16 PROCEDURE — 3008F BODY MASS INDEX DOCD: CPT | Performed by: FAMILY MEDICINE

## 2019-07-16 PROCEDURE — 99213 OFFICE O/P EST LOW 20 MIN: CPT | Performed by: FAMILY MEDICINE

## 2019-07-16 NOTE — PROGRESS NOTES
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  At this time, his right wrist is more painful than the left  The following portions of the patient's history were reviewed and updated as appropriate: allergies, current medications, past family history, past medical history, past social history, past surgical history and problem list     Review of Systems  Pertinent items are noted in HPI       Objective     /80   Pulse 79   Ht 5' 7" (1 702 m)   Wt 73 9 kg (163 lb)   SpO2 99%   BMI 25 53 kg/m²   Right wrist:  radial pulse normal, sensation normal, negative Phalen, negative Tinel, normal ipsilateral elbow and ROM is full but painful  Crepitus noted with ROM  No warmth or nodules  Vasculature and sensation intact   and finger adduction/abduction strength normal   Left wrist:  radial pulse normal, sensation normal, negative Phalen, negative Tinel and normal ipsilateral elbow and ROM is full but painful  Crepitus noted with ROM  No warmth or nodules  Vasculature and sensation intact   and finger adduction/abduction strength normal       Assessment/Plan     Crepitus of bilateral wrists  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    · Natural history and expected course discussed  Questions answered  · Resr, ice, compression, and elevation (RICE) therapy  · Plain film x-rays per orders    · OTC analgesics as needed  · Orthopedics referral     All patient questions & concerns were addressed  The patient agrees with his treatment plan  RTO luis fernando Hall,   07/21/19  1:43 PM    Some portions of this record may have been generated with voice recognition software  There may be translation, syntax, or grammatical errors  Occasional wrong word or "sound-a-like" substitutions may have occurred due to the inherent limitations of the voice recognition software  Read the chart carefully and recognize, using context, where substations may have occurred   If you have any questions, please contact the dictating provider for clarification or correction, as needed

## 2019-07-22 ENCOUNTER — TRANSCRIBE ORDERS (OUTPATIENT)
Dept: ADMINISTRATIVE | Facility: HOSPITAL | Age: 44
End: 2019-07-22

## 2019-07-22 ENCOUNTER — HOSPITAL ENCOUNTER (OUTPATIENT)
Dept: RADIOLOGY | Facility: HOSPITAL | Age: 44
Discharge: HOME | End: 2019-07-22
Payer: COMMERCIAL

## 2019-07-22 DIAGNOSIS — M24.832: ICD-10-CM

## 2019-07-22 DIAGNOSIS — M24.831: ICD-10-CM

## 2019-07-22 DIAGNOSIS — M19.039 WRIST ARTHRITIS: ICD-10-CM

## 2019-07-22 PROCEDURE — 73110 X-RAY EXAM OF WRIST: CPT

## 2019-07-23 ENCOUNTER — CONSULT (OUTPATIENT)
Dept: OBGYN CLINIC | Facility: CLINIC | Age: 44
End: 2019-07-23
Payer: COMMERCIAL

## 2019-07-23 VITALS
HEART RATE: 52 BPM | HEIGHT: 68 IN | SYSTOLIC BLOOD PRESSURE: 128 MMHG | BODY MASS INDEX: 24.67 KG/M2 | WEIGHT: 162.8 LBS | DIASTOLIC BLOOD PRESSURE: 88 MMHG

## 2019-07-23 DIAGNOSIS — M19.039 WRIST ARTHRITIS: ICD-10-CM

## 2019-07-23 DIAGNOSIS — M24.832: ICD-10-CM

## 2019-07-23 DIAGNOSIS — M24.831: ICD-10-CM

## 2019-07-23 DIAGNOSIS — S69.81XA INJURY OF TRIANGULAR FIBROCARTILAGE COMPLEX (TFCC) OF RIGHT WRIST, INITIAL ENCOUNTER: Primary | ICD-10-CM

## 2019-07-23 PROCEDURE — 99243 OFF/OP CNSLTJ NEW/EST LOW 30: CPT | Performed by: ORTHOPAEDIC SURGERY

## 2019-07-23 NOTE — LETTER
July 23, 2019     DO Suleman Dyson [id number]    Patient: Philly Prasad   YOB: 1975   Date of Visit: 7/23/2019       Dear Dr Enrrique Robertson:    Thank you for referring Philly Prasad to me for evaluation  Below are the relevant portions of my assessment and plan of care  I discussed with May Lima today that his signs and symptoms are consistent with a right wrist TFCC sprain  He is tender to palpation over the TFCC  Treatment options were discussed in the form of bracing for the next 6 weeks  He was agreeable to this  He was fitted and provided with a cock-up wrist brace  A steroid injection was discussed however he deferred this today  He will follow up in 6 weeks for repeat evaluation  If you have questions, please do not hesitate to call me  I look forward to following May Lima along with you           Sincerely,        Celestino Merritt DO        CC: No Recipients

## 2019-07-23 NOTE — PROGRESS NOTES
Assessment/Plan:  1  Injury of triangular fibrocartilage complex (TFCC) of right wrist, initial encounter     2  Crepitus of joint of left wrist  Ambulatory referral to Orthopedic Surgery   3  Crepitus of joint of right wrist  Ambulatory referral to Orthopedic Surgery   4  Wrist arthritis  Ambulatory referral to Orthopedic Surgery       Scribe Attestation    I,:   Inocencia Casey MA am acting as a scribe while in the presence of the attending physician :        I,:   Jenn De La Torre DO personally performed the services described in this documentation    as scribed in my presence :              I discussed with Ubaldo Vu today that his signs and symptoms are consistent with a right wrist TFCC sprain  He is tender to palpation over the TFCC  Treatment options were discussed in the form of bracing for the next 6 weeks  He was agreeable to this  He was fitted and provided with a cock-up wrist brace  A steroid injection was discussed however he deferred this today  He will follow up in 6 weeks for repeat evaluation  Subjective:   Amaya Olsen is a 38 y o  male who presents to the office today as a referral from his PCP for evaluation of bilateral wrist pain  Patient states this has been ongoing for the past 2-3 months  Patient states it initially started in his left wrist but that has since resolved  His main complaint is pain to the right wrist  Patient notes pain to the volar and dorsal aspect of his right wrist  He states this is increased with range of motion and throwing  He notes increased pain in the push up position  Patient states he notes a clicking at times as well  Patient was provided wrist braces by his PCP which he states he has been compliant with  He denies any numbness or tingling  Review of Systems   Constitutional: Positive for unexpected weight change  Negative for chills and fever  HENT: Positive for sore throat  Negative for drooling and sneezing      Eyes: Positive for pain and visual disturbance  Negative for redness  Respiratory: Negative for cough and wheezing  Gastrointestinal: Negative for nausea and vomiting  Musculoskeletal: Positive for arthralgias  Negative for joint swelling and myalgias  Neurological: Positive for headaches  Negative for weakness and numbness  Psychiatric/Behavioral: Negative for behavioral problems  The patient is not nervous/anxious  Past Medical History:   Diagnosis Date    Anemia        Past Surgical History:   Procedure Laterality Date    NO PAST SURGERIES         Family History   Problem Relation Age of Onset    No Known Problems Mother     No Known Problems Father        Social History     Occupational History    Not on file   Tobacco Use    Smoking status: Current Every Day Smoker     Types: E-Cigarettes    Smokeless tobacco: Never Used    Tobacco comment: vapes   Substance and Sexual Activity    Alcohol use: Yes     Alcohol/week: 1 0 standard drinks     Types: 1 Standard drinks or equivalent per week     Comment: Occ      Drug use: No    Sexual activity: Not on file         Current Outpatient Medications:     Ascorbic Acid (VITAMIN C PO), Take by mouth, Disp: , Rfl:     Multiple Vitamins-Minerals (EYE VITAMINS PO), Take by mouth daily, Disp: , Rfl:     Multiple Vitamins-Minerals (HAIR SKIN AND NAILS FORMULA PO), Take by mouth daily, Disp: , Rfl:     Multiple Vitamins-Minerals (MULTIVITAMIN ADULT) TABS, Take by mouth daily, Disp: , Rfl:     Omega-3 Fatty Acids (FISH OIL PO), Take by mouth, Disp: , Rfl:     al mag oxide-diphenhydramine-lidocaine viscous (MAGIC MOUTHWASH) 1:1:1 suspension, Swish and spit 10 mL every 4 (four) hours as needed for mouth pain or discomfort (Patient not taking: Reported on 7/23/2019), Disp: 180 mL, Rfl: 0    glucosamine-chondroitin 500-400 MG tablet, Take 1 tablet by mouth 3 (three) times a day, Disp: , Rfl:     tobramycin (TOBREX) 0 3 % SOLN, Administer 1 drop to both eyes every 4 (four) hours while awake (Patient not taking: Reported on 5/16/2019), Disp: 5 mL, Rfl: 0    No Known Allergies    Objective:  Vitals:    07/23/19 0816   BP: 128/88   Pulse: (!) 52       Ortho Exam     Left wrist    Full ROM  DURJ stable pronation and supination   NTTP TFCC  Compartments soft  Brisk capillary refill  Sensation intact median, radial, and ulnar nerve    Right wrist    Ext 90  Flex 80  15 ulnar and radial deviation  Full pronation and supination  - tinel's  - claude's   NTTP FCR tendon  NTTP ECU   NTTP scaphoid   DURJ stable pronation and supination   TTP TFCC  Compartments soft  Brisk capillary refill  Sensation intact median, radial, and ulnar nerve     Physical Exam   Constitutional: He is oriented to person, place, and time  He appears well-developed and well-nourished  HENT:   Head: Normocephalic and atraumatic  Eyes: Conjunctivae are normal  Right eye exhibits no discharge  Left eye exhibits no discharge  Neck: Normal range of motion  Neck supple  Cardiovascular: Normal rate and intact distal pulses  Pulmonary/Chest: Effort normal  No respiratory distress  Musculoskeletal:   As noted in HPI   Neurological: He is alert and oriented to person, place, and time  Skin: Skin is warm and dry  Psychiatric: He has a normal mood and affect  His behavior is normal  Judgment and thought content normal        I have personally reviewed pertinent films in PACS and my interpretation is as follows:X-ray bilateral wrist performed on 7/22/19 demonstrates no osseous abnormalities

## 2019-10-23 ENCOUNTER — OFFICE VISIT (OUTPATIENT)
Dept: FAMILY MEDICINE CLINIC | Facility: CLINIC | Age: 44
End: 2019-10-23
Payer: COMMERCIAL

## 2019-10-23 VITALS
SYSTOLIC BLOOD PRESSURE: 120 MMHG | HEIGHT: 68 IN | RESPIRATION RATE: 18 BRPM | HEART RATE: 62 BPM | DIASTOLIC BLOOD PRESSURE: 72 MMHG | BODY MASS INDEX: 25.01 KG/M2 | WEIGHT: 165 LBS

## 2019-10-23 DIAGNOSIS — Z72.0 NICOTINE ABUSE: ICD-10-CM

## 2019-10-23 DIAGNOSIS — Z00.00 ANNUAL PHYSICAL EXAM: Primary | ICD-10-CM

## 2019-10-23 DIAGNOSIS — R39.12 WEAK URINARY STREAM: ICD-10-CM

## 2019-10-23 DIAGNOSIS — Z20.2 EXPOSURE TO STD: ICD-10-CM

## 2019-10-23 DIAGNOSIS — Z23 NEED FOR INFLUENZA VACCINATION: ICD-10-CM

## 2019-10-23 PROBLEM — E63.9 NUTRITION IMPAIRED DUE TO IMBALANCE OF NUTRIENTS: Status: ACTIVE | Noted: 2019-10-23

## 2019-10-23 PROCEDURE — 99396 PREV VISIT EST AGE 40-64: CPT | Performed by: FAMILY MEDICINE

## 2019-10-23 NOTE — PROGRESS NOTES
[address] [facility]    NAME: Ashley Carrillo  AGE: 38 y o  SEX: male  : 1975     DATE: 10/23/2019     Assessment and Plan:     Problem List Items Addressed This Visit        Other    Nicotine abuse      Other Visit Diagnoses     Annual physical exam    -  Primary    Relevant Orders    Lipid panel    Basic metabolic panel    Hemoglobin A1C    CBC and Platelet    Need for influenza vaccination        BMI 25 0-25 9,adult        Weak urinary stream        Relevant Orders    UA (URINE) with reflex to Scope  -Prostate exam negative for nodules, negative for enlargement    Exposure to STD        Relevant Orders    Chlamydia/GC amplified DNA by PCR        Neck/ occipital pain: on phsyical seems to be focused over Trapezius muscle with point tenderness  Patient advised to apply warm compresses and attempt if possible to decrease workload  Immunizations and preventive care screenings were discussed with patient today  Appropriate education was printed on patient's after visit summary  Counseling:  Alcohol/drug use: discussed moderation in alcohol intake, the recommendations for healthy alcohol use, and avoidance of illicit drug use  Patient reports drinking half a pint of cognac about 3 times a weeek  · Exercise: the importance of regular exercise/physical activity was discussed  Recommend exercise 3-5 times per week for at least 30 minutes  · Nicotine dependence: Patient was counseled on the use of Vape and nicotine dependence  BMI Counseling: Body mass index is 25 09 kg/m²  The BMI is above normal  Nutrition recommendations include encouraging healthy choices of fruits and vegetables and decreasing fast food intake  No follow-ups on file       Chief Complaint:     Chief Complaint   Patient presents with    Physical Exam      History of Present Illness:     Adult Annual Physical   Patient here for a comprehensive physical exam  The patient reports on review that he gets headaches from time to time which are located at his occiput, exacerbated sometimes when he turns his head or leans head down  No relieving factors, no radiation  Patient stated it's hard to describe, like a headache  Diet and Physical Activity  · Diet/Nutrition: poor diet and limited fruits/vegetables  · Exercise: no formal exercise  Maybe once a weeks does 20 pushups and situps     Depression Screening  PHQ-9 Depression Screening    PHQ-9:    Frequency of the following problems over the past two weeks:       Little interest or pleasure in doing things:  0 - not at all  Feeling down, depressed, or hopeless:  0 - not at all  PHQ-2 Score:  0       General Health  · Sleep: gets 7-8 hours of sleep on average  · Hearing: normal - bilateral   · Vision: wears glasses and due for an eye exam and patient says he patient reports he will schedule one soon  · Dental: regular dental visits, brushes teeth twice daily and does not floss   Health  · Symptoms include: weak urinary stream     Review of Systems:     Review of Systems   Constitutional: Negative for fever  HENT: Negative for sore throat  Eyes: Negative for pain and visual disturbance  Respiratory: Positive for shortness of breath (With lots of work and 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)  Negative for cough  Cardiovascular: Negative for chest pain  Gastrointestinal: Negative for abdominal pain, constipation and diarrhea  Genitourinary: Negative for dysuria  Allergic/Immunologic: Negative for environmental allergies and food allergies  Neurological: Positive for headaches  Past Medical History:     Past Medical History:   Diagnosis Date    Anemia       Past Surgical History:     Past Surgical History:   Procedure Laterality Date    NO PAST SURGERIES        Family History:     Family History   Problem Relation Age of Onset    No Known Problems Mother     No Known Problems Father       Social History:     Social History     Socioeconomic History    Marital status: /Civil Union     Spouse name: None    Number of children: None    Years of education: None    Highest education level: None   Occupational History    None   Social Needs    Financial resource strain: None    Food insecurity:     Worry: None     Inability: None    Transportation needs:     Medical: None     Non-medical: None   Tobacco Use    Smoking status: Current Every Day Smoker     Types: E-Cigarettes    Smokeless tobacco: Never Used    Tobacco comment: vapes   Substance and Sexual Activity    Alcohol use: Yes     Alcohol/week: 1 0 standard drinks     Types: 1 Standard drinks or equivalent per week     Comment: Occ      Drug use: No    Sexual activity: Yes     Partners: Female     Birth control/protection: None     Comment: Wife   Lifestyle    Physical activity:     Days per week: None     Minutes per session: None    Stress: None   Relationships    Social connections:     Talks on phone: None     Gets together: None     Attends [employer] service: None     Active member of club or organization: None     Attends meetings of clubs or organizations: None     Relationship status: None    Intimate partner violence:     Fear of current or ex partner: None     Emotionally abused: None     Physically abused: None     Forced sexual activity: None   Other Topics Concern    None   Social History Narrative    None      Current Medications:     Current Outpatient Medications   Medication Sig Dispense Refill    al mag oxide-diphenhydramine-lidocaine viscous (MAGIC MOUTHWASH) 1:1:1 suspension Swish and spit 10 mL every 4 (four) hours as needed for mouth pain or discomfort (Patient not taking: Reported on 7/23/2019) 180 mL 0    Ascorbic Acid (VITAMIN C PO) Take by mouth      glucosamine-chondroitin 500-400 MG tablet Take 1 tablet by mouth 3 (three) times a day      Multiple Vitamins-Minerals (EYE VITAMINS PO) Take by mouth daily      Multiple Vitamins-Minerals (HAIR SKIN AND NAILS FORMULA PO) Take by mouth daily      Multiple Vitamins-Minerals (MULTIVITAMIN ADULT) TABS Take by mouth daily      Omega-3 Fatty Acids (FISH OIL PO) Take by mouth      tobramycin (TOBREX) 0 3 % SOLN Administer 1 drop to both eyes every 4 (four) hours while awake (Patient not taking: Reported on 5/16/2019) 5 mL 0     No current facility-administered medications for this visit  Allergies:     No Known Allergies   Physical Exam:     /72   Pulse 62   Resp 18   Ht 5' 8" (1 727 m)   Wt 74 8 kg (165 lb)   BMI 25 09 kg/m²     Physical Exam   Constitutional: He is oriented to person, place, and time  He appears well-developed and well-nourished  No distress  HENT:   Head: Normocephalic and atraumatic  Eyes: Pupils are equal, round, and reactive to light  EOM are normal  Right eye exhibits no discharge  Left eye exhibits no discharge  Neck: Normal range of motion  Neck supple  Cardiovascular: Normal rate and regular rhythm  No murmur heard  Pulmonary/Chest: Effort normal and breath sounds normal  He has no wheezes  Abdominal: Soft   Bowel sounds are normal  There is no tenderness  There is no guarding  Musculoskeletal: Normal range of motion  He exhibits no edema  Slight discomfort of Rt Trapezius muscle during active and passive neck flexion to the opposite side/ left  Lymphadenopathy:     He has no cervical adenopathy  Neurological: He is alert and oriented to person, place, and time  Skin: Skin is warm and dry  Capillary refill takes less than 2 seconds  Psychiatric: He has a normal mood and affect  Vitals reviewed    Prostate exam: no nodules to palpation, non enlarged    Neophytos Rachel Carvajal MD  [address]

## 2019-10-23 NOTE — PATIENT INSTRUCTIONS

## 2019-10-25 LAB
APPEARANCE UR: CLEAR
BILIRUB UR QL STRIP: NEGATIVE
C TRACH RRNA SPEC QL NAA+PROBE: NEGATIVE
COLOR UR: YELLOW
GLUCOSE UR QL: NEGATIVE
HGB UR QL STRIP: NEGATIVE
KETONES UR QL STRIP: NEGATIVE
LEUKOCYTE ESTERASE UR QL STRIP: NEGATIVE
MICRO URNS: NORMAL
N GONORRHOEA RRNA SPEC QL NAA+PROBE: NEGATIVE
NITRITE UR QL STRIP: NEGATIVE
PH UR STRIP: 7 [PH] (ref 5–7.5)
PROT UR QL STRIP: NEGATIVE
SP GR UR: 1.03 (ref 1–1.03)
UROBILINOGEN UR STRIP-ACNC: 0.2 MG/DL (ref 0.2–1)

## 2019-10-30 ENCOUNTER — CLINICAL SUPPORT (OUTPATIENT)
Dept: FAMILY MEDICINE CLINIC | Facility: CLINIC | Age: 44
End: 2019-10-30
Payer: COMMERCIAL

## 2019-10-30 DIAGNOSIS — Z11.1 PPD SCREENING TEST: Primary | ICD-10-CM

## 2019-10-30 PROCEDURE — 86580 TB INTRADERMAL TEST: CPT

## 2019-10-30 PROCEDURE — 99211 OFF/OP EST MAY X REQ PHY/QHP: CPT

## 2019-11-01 ENCOUNTER — CLINICAL SUPPORT (OUTPATIENT)
Dept: FAMILY MEDICINE CLINIC | Facility: CLINIC | Age: 44
End: 2019-11-01
Payer: COMMERCIAL

## 2019-11-01 DIAGNOSIS — Z11.1 ENCOUNTER FOR PPD SKIN TEST READING: Primary | ICD-10-CM

## 2019-11-01 LAB
INDURATION: 0 MM
TB SKIN TEST: NEGATIVE

## 2019-11-01 PROCEDURE — 99211 OFF/OP EST MAY X REQ PHY/QHP: CPT | Performed by: FAMILY MEDICINE

## 2019-11-05 ENCOUNTER — HOSPITAL ENCOUNTER (EMERGENCY)
Facility: HOSPITAL | Age: 44
Discharge: HOME | End: 2019-11-05
Attending: EMERGENCY MEDICINE | Admitting: EMERGENCY MEDICINE

## 2019-11-05 VITALS
TEMPERATURE: 98.7 F | RESPIRATION RATE: 18 BRPM | OXYGEN SATURATION: 99 % | HEART RATE: 72 BPM | DIASTOLIC BLOOD PRESSURE: 79 MMHG | SYSTOLIC BLOOD PRESSURE: 127 MMHG
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
## 2019-11-05 DIAGNOSIS — S16.1XXA STRAIN OF NECK MUSCLE, INITIAL ENCOUNTER: ICD-10-CM

## 2019-11-05 PROCEDURE — 99283 EMERGENCY DEPT VISIT LOW MDM: CPT

## 2019-11-05 RX ORDER — METHOCARBAMOL 500 MG/1
500 TABLET, FILM COATED ORAL 2 TIMES DAILY
Qty: 20 TABLET | Refills: 0 | Status: SHIPPED | OUTPATIENT
Start: 2019-11-05 | End: 2020-02-23

## 2019-11-05 RX ORDER — NAPROXEN 500 MG/1
500 TABLET ORAL ONCE
Status: COMPLETED | OUTPATIENT
Start: 2019-11-05 | End: 2019-11-05

## 2019-11-05 RX ORDER — NAPROXEN 500 MG/1
500 TABLET ORAL 2 TIMES DAILY WITH MEALS
Qty: 14 TABLET | Refills: 0 | Status: SHIPPED | OUTPATIENT
Start: 2019-11-05 | End: 2020-02-23

## 2019-11-05 RX ADMIN — NAPROXEN 500 MG: 500 TABLET ORAL at 20:52
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
## 2020-01-20 ENCOUNTER — OFFICE VISIT (OUTPATIENT)
Dept: FAMILY MEDICINE CLINIC | Facility: CLINIC | Age: 45
End: 2020-01-20
Payer: COMMERCIAL

## 2020-01-20 VITALS
HEART RATE: 64 BPM | OXYGEN SATURATION: 100 % | BODY MASS INDEX: 27.04 KG/M2 | DIASTOLIC BLOOD PRESSURE: 64 MMHG | TEMPERATURE: 97.2 F | RESPIRATION RATE: 18 BRPM | HEIGHT: 67 IN | WEIGHT: 172.3 LBS | SYSTOLIC BLOOD PRESSURE: 100 MMHG

## 2020-01-20 DIAGNOSIS — M54.2 NECK PAIN: Primary | ICD-10-CM

## 2020-01-20 DIAGNOSIS — M54.50 LUMBAR BACK PAIN: ICD-10-CM

## 2020-01-20 PROCEDURE — 99213 OFFICE O/P EST LOW 20 MIN: CPT | Performed by: FAMILY MEDICINE
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
## 2020-01-24 ENCOUNTER — TELEPHONE (OUTPATIENT)
Dept: FAMILY MEDICINE CLINIC | Facility: CLINIC | Age: 45
End: 2020-01-24

## 2020-01-24 NOTE — TELEPHONE ENCOUNTER
"Chief Complaint   Patient presents with     Physical       Initial /90 (BP Location: Left arm, Patient Position: Sitting, Cuff Size: Adult Regular)  Pulse 101  Temp 98.4  F (36.9  C) (Oral)  Ht 5' 4\" (1.626 m)  Wt 173 lb (78.5 kg)  LMP 2018  BMI 29.7 kg/m2 Estimated body mass index is 29.7 kg/(m^2) as calculated from the following:    Height as of this encounter: 5' 4\" (1.626 m).    Weight as of this encounter: 173 lb (78.5 kg).  BP completed using cuff size: regular    Questioned patient about current smoking habits. Former smoker          The following HM Due: pap smear      The following patient reported/Care Every where data was sent to:  P [employer] [[id number]]  JENNY Armijo             " Nell Maynard from [employer] called in to report that Patient is not able to have [employer] any of the bills being he did not chose (PIP) Personal Protection Protection, so all office visits will have to go thru his [address]

## 2020-01-28 ENCOUNTER — TRANSCRIBE ORDERS (OUTPATIENT)
Dept: ADMINISTRATIVE | Facility: HOSPITAL | Age: 45
End: 2020-01-28

## 2020-01-28 ENCOUNTER — HOSPITAL ENCOUNTER (OUTPATIENT)
Dept: RADIOLOGY | Facility: HOSPITAL | Age: 45
Discharge: HOME | End: 2020-01-28
Payer: COMMERCIAL

## 2020-01-28 DIAGNOSIS — M54.2 NECK PAIN: ICD-10-CM

## 2020-01-28 DIAGNOSIS — M54.50 LUMBAR BACK PAIN: ICD-10-CM

## 2020-01-28 PROCEDURE — 72040 X-RAY EXAM NECK SPINE 2-3 VW: CPT

## 2020-01-28 PROCEDURE — 72100 X-RAY EXAM L-S SPINE 2/3 VWS: CPT

## 2020-02-02 ENCOUNTER — TELEPHONE (OUTPATIENT)
Dept: OTHER | Facility: HOSPITAL | Age: 45
End: 2020-02-02

## 2020-02-04 ENCOUNTER — TELEPHONE (OUTPATIENT)
Dept: FAMILY MEDICINE CLINIC | Facility: CLINIC | Age: 45
End: 2020-02-04

## 2020-02-19 NOTE — PROGRESS NOTES
Assessment/Plan:     Diagnoses and all orders for this visit:    Blurry vision, bilateral  -     HEMOGLOBIN A1C W/ EAG ESTIMATION; Future  -     HEMOGLOBIN A1C W/ EAG ESTIMATION    Frequency of urination and polyuria  -     HEMOGLOBIN A1C W/ EAG ESTIMATION; Future  -     HEMOGLOBIN A1C W/ EAG ESTIMATION    Screening for diabetes mellitus  -     HEMOGLOBIN A1C W/ EAG ESTIMATION; Future  -     HEMOGLOBIN A1C W/ EAG ESTIMATION    Screening, lipid  -     Lipid panel; Future  -     Lipid panel    Fatigue, unspecified type  -     CBC and differential; Future  -     Comprehensive metabolic panel; Future  -     CBC and differential  -     Comprehensive metabolic panel      Discussed with Dr Anastasiya Jefferson  Patient with symptoms concerning for diabetes  Blood work as above  Also reviewed patient's cervical and lumbar x-rays  He did say that he continues to have pain and soreness in his neck and low back however I did not examine as this was not the reason for his visit  I did advise him to pursue physical therapy which he was referred to at his last visit and also advised that he make an appointment for OMT on Tuesday morning  I told patient I would call with results of his lab work and follow up as needed  Also advised that he go see his optometrist to ensure that his glasses are appropriate  If hs blood work does not reveal underlying etiology will consider referral to ophthalmology for further evaluation  All questions were answered and patient agrees with plan  Subjective:      Patient ID: Laz Rodriguez is a 38 y o  male  Patient past medical history of cervicalgia and low back pain presents to clinic with complaint of blurry vision, polyuria, urinary frequency, fatigue and lightheadedness  Symptoms are worse during the end of the day when he is trying to focus his eyes (driving, looking at phone)  Describes lightheadedness as "funny feeling in my head " Denies sensation of room spinning   Not associated with head/body position  Reports urinary symptoms have been occurring for "years " Blurry vision, headaches, fatigue and lightheadedness have been present x 1 year  Was seen in October for annual physical and had A1c ordered at that visit but never went for blood work  He is wondering if blurry vision is related to his glasses  Also reports continued pain in neck and low back pain  The following portions of the patient's history were reviewed and updated as appropriate: allergies, current medications, past family history, past medical history, past social history, past surgical history and problem list     Review of Systems   Constitutional: Negative for chills and fever  HENT: Negative for congestion, ear pain, rhinorrhea, sinus pain and sore throat  Respiratory: Negative for cough and shortness of breath  Cardiovascular: Negative for chest pain  Gastrointestinal: Negative for abdominal pain, diarrhea, nausea and vomiting  Genitourinary: Negative for difficulty urinating and dysuria  Musculoskeletal: Negative for arthralgias  Skin: Negative for rash  Neurological: Negative for dizziness and headaches  Objective:    /80 (BP Location: Left arm, Patient Position: Sitting, Cuff Size: Adult)   Pulse 60   Resp 18   Ht 5' 7" (1 702 m)   Wt 77 8 kg (171 lb 9 6 oz)   SpO2 100%   BMI 26 88 kg/m²      Physical Exam   Constitutional: He is oriented to person, place, and time  He appears well-developed and well-nourished  HENT:   Head: Normocephalic and atraumatic  Right Ear: Tympanic membrane, external ear and ear canal normal    Left Ear: Tympanic membrane, external ear and ear canal normal    Nose: Nose normal    Mouth/Throat: Uvula is midline and oropharynx is clear and moist    Eyes: Pupils are equal, round, and reactive to light  Conjunctivae and EOM are normal    Cardiovascular: Normal rate, regular rhythm and normal heart sounds  No murmur heard    Pulmonary/Chest: Effort normal and breath sounds normal  He has no wheezes  Neurological: He is alert and oriented to person, place, and time

## 2020-02-20 ENCOUNTER — OFFICE VISIT (OUTPATIENT)
Dept: FAMILY MEDICINE CLINIC | Facility: CLINIC | Age: 45
End: 2020-02-20
Payer: COMMERCIAL

## 2020-02-20 VITALS
BODY MASS INDEX: 26.93 KG/M2 | WEIGHT: 171.6 LBS | OXYGEN SATURATION: 100 % | SYSTOLIC BLOOD PRESSURE: 138 MMHG | HEART RATE: 60 BPM | HEIGHT: 67 IN | DIASTOLIC BLOOD PRESSURE: 80 MMHG | RESPIRATION RATE: 18 BRPM

## 2020-02-20 DIAGNOSIS — H53.8 BLURRY VISION, BILATERAL: Primary | ICD-10-CM

## 2020-02-20 DIAGNOSIS — Z13.220 SCREENING, LIPID: ICD-10-CM

## 2020-02-20 DIAGNOSIS — Z13.1 SCREENING FOR DIABETES MELLITUS: ICD-10-CM

## 2020-02-20 DIAGNOSIS — R53.83 FATIGUE, UNSPECIFIED TYPE: ICD-10-CM

## 2020-02-20 DIAGNOSIS — R35.0 FREQUENCY OF URINATION AND POLYURIA: ICD-10-CM

## 2020-02-20 DIAGNOSIS — R35.89 FREQUENCY OF URINATION AND POLYURIA: ICD-10-CM

## 2020-02-20 PROCEDURE — 99213 OFFICE O/P EST LOW 20 MIN: CPT | Performed by: FAMILY MEDICINE

## 2020-02-20 PROCEDURE — 3008F BODY MASS INDEX DOCD: CPT | Performed by: FAMILY MEDICINE

## 2020-02-23 ENCOUNTER — HOSPITAL ENCOUNTER (EMERGENCY)
Facility: HOSPITAL | Age: 45
Discharge: HOME | End: 2020-02-23
Attending: EMERGENCY MEDICINE | Admitting: EMERGENCY MEDICINE
Payer: COMMERCIAL

## 2020-02-23 ENCOUNTER — APPOINTMENT (EMERGENCY)
Dept: RADIOLOGY | Facility: HOSPITAL | Age: 45
End: 2020-02-23
Payer: COMMERCIAL

## 2020-02-23 VITALS
WEIGHT: 171 LBS | HEART RATE: 59 BPM | HEIGHT: 67 IN | TEMPERATURE: 98 F | OXYGEN SATURATION: 100 % | BODY MASS INDEX: 26.84 KG/M2 | SYSTOLIC BLOOD PRESSURE: 126 MMHG | DIASTOLIC BLOOD PRESSURE: 74 MMHG | RESPIRATION RATE: 18 BRPM

## 2020-02-23 DIAGNOSIS — S93.409A ANKLE SPRAIN: Primary | ICD-10-CM

## 2020-02-23 PROCEDURE — 73610 X-RAY EXAM OF ANKLE: CPT

## 2020-02-23 PROCEDURE — 73630 X-RAY EXAM OF FOOT: CPT

## 2020-02-23 PROCEDURE — 99283 EMERGENCY DEPT VISIT LOW MDM: CPT | Performed by: EMERGENCY MEDICINE

## 2020-02-23 PROCEDURE — 99283 EMERGENCY DEPT VISIT LOW MDM: CPT

## 2020-02-23 NOTE — ED PROVIDER NOTES
History  Chief Complaint   Patient presents with    Ankle Injury     Pateint was playing basketball yesterday when he injured his right ankle,iced it last night,but this morning barely can put pressure on that foot,hurting more than yesterday     HPI    This is a 38 year male that presents with an ankle injury  Patient states he was playing basketball yesterday when he feels like he injured his right ankle  Patient states he might have had an inversion injury  Patient states diffuse swelling around the ankle  States pain when he puts pressure on it  Yesterday was able to ambulate but today more difficult with pressure  Denies numbness or tingling  No pain in his lower leg or knee area  No previous injuries  38 year male that presents with right ankle injury will get x-ray    Prior to Admission Medications   Prescriptions Last Dose Informant Patient Reported? Taking? Ascorbic Acid (VITAMIN C PO)  Self Yes Yes   Sig: Take by mouth   Multiple Vitamins-Minerals (EYE VITAMINS PO)   Yes Yes   Sig: Take by mouth daily   Multiple Vitamins-Minerals (HAIR SKIN AND NAILS FORMULA PO)   Yes Yes   Sig: Take by mouth daily   Multiple Vitamins-Minerals (MULTIVITAMIN ADULT) TABS   Yes Yes   Sig: Take by mouth daily   Omega-3 Fatty Acids (FISH OIL PO)  Self Yes Yes   Sig: Take by mouth   glucosamine-chondroitin 500-400 MG tablet   Yes Yes   Sig: Take 1 tablet by mouth 3 (three) times a day      Facility-Administered Medications: None       Past Medical History:   Diagnosis Date    Anemia        Past Surgical History:   Procedure Laterality Date    NO PAST SURGERIES         Family History   Problem Relation Age of Onset    No Known Problems Mother     No Known Problems Father      I have reviewed and agree with the history as documented      Social History     Tobacco Use    Smoking status: Current Every Day Smoker     Types: E-Cigarettes    Smokeless tobacco: Never Used    Tobacco comment: vapes   Substance Use Topics    Alcohol use: Yes     Alcohol/week: 1 0 standard drinks     Types: 1 Standard drinks or equivalent per week     Comment: Occ   Drug use: No       Review of Systems   Constitutional: Negative  Negative for diaphoresis and fever  HENT: Negative  Respiratory: Negative  Negative for cough, shortness of breath and wheezing  Cardiovascular: Negative  Negative for chest pain, palpitations and leg swelling  Gastrointestinal: Negative for abdominal distention, abdominal pain, nausea and vomiting  Genitourinary: Negative  Musculoskeletal:        Right ankle pain      Skin: Negative  Neurological: Negative  Psychiatric/Behavioral: Negative  All other systems reviewed and are negative  Physical Exam  Physical Exam   Constitutional: He is oriented to person, place, and time  He appears well-developed and well-nourished  No distress  HENT:   Head: Normocephalic and atraumatic  Nose: Nose normal    Mouth/Throat: Oropharynx is clear and moist    Eyes: Pupils are equal, round, and reactive to light  Conjunctivae and EOM are normal    Neck: Normal range of motion  Neck supple  Cardiovascular: Normal rate, regular rhythm and normal heart sounds  No murmur heard  Pulmonary/Chest: Effort normal and breath sounds normal  No respiratory distress  He has no wheezes  He has no rales  Abdominal: Soft  Bowel sounds are normal  He exhibits no distension  There is no tenderness  There is no rebound and no guarding  Musculoskeletal: Normal range of motion  He exhibits no edema, tenderness or deformity  Right ankle swelling  Tenderness medial and lateral ankle with lateral foot tenderness  2+ pulses  Soft compartments    Neurological: He is alert and oriented to person, place, and time  No cranial nerve deficit  Skin: Skin is warm and dry  No rash noted  He is not diaphoretic  No pallor  Psychiatric: He has a normal mood and affect  Vitals reviewed        Vital Signs  ED Triage Vitals [02/23/20 0804]   Temperature Pulse Respirations Blood Pressure SpO2   98 °F (36 7 °C) 59 18 126/74 100 %      Temp Source Heart Rate Source Patient Position - Orthostatic VS BP Location FiO2 (%)   Tympanic Monitor Sitting Right arm --      Pain Score       --           Vitals:    02/23/20 0804   BP: 126/74   Pulse: 59   Patient Position - Orthostatic VS: Sitting         Visual Acuity      ED Medications  Medications - No data to display    Diagnostic Studies  Results Reviewed     None                 XR foot 3+ views RIGHT   Final Result by Maldonado Forte MD (02/23 0152)      No acute osseous abnormality  Workstation performed: [id number]         XR ankle 3+ views RIGHT   Final Result by Maldonado Forte MD (02/23 8153)      No acute osseous abnormality  Workstation performed: [id number]                    Procedures  Procedures         ED Course                               MDM  Number of Diagnoses or Management Options  Ankle sprain:   Diagnosis management comments: 46-year-old male that presents today with ankle sprain  Found to have no fracture  Discussed return precautions  Gave him an Aircast   Advised to return if any worsening of symptoms  Follow-up with ortho was given  Disposition  Final diagnoses: Ankle sprain     Time reflects when diagnosis was documented in both MDM as applicable and the Disposition within this note     Time User Action Codes Description Comment    2/23/2020  8:59 AM Guanako Ng Add [G91 535B] Ankle sprain       ED Disposition     ED Disposition Condition Date/Time Comment    Discharge Stable Sun Feb 23, 2020  8:59 AM Deangelo Douglas discharge to home/self care              Follow-up Information     Follow up With Specialties Details Why Contact Info Additional [address] [place] [address] Specialists [facility] Orthopedic Surgery Schedule an appointment as soon as possible for a visit   [address] [place] [address] [address]  [phone] [address] [facility] [address] [facility], [address], [place], [place], [address] [phone]          Patient's Medications   Discharge Prescriptions    No medications on file     No discharge procedures on file      PDMP Review     None          ED Provider  Electronically Signed by           Calvin Moore MD  02/23/20 4008

## 2020-03-10 ENCOUNTER — HOSPITAL ENCOUNTER (EMERGENCY)
Facility: HOSPITAL | Age: 45
Discharge: HOME | End: 2020-03-10
Attending: EMERGENCY MEDICINE | Admitting: EMERGENCY MEDICINE
Payer: COMMERCIAL

## 2020-03-10 VITALS
WEIGHT: 175 LBS | HEART RATE: 60 BPM | DIASTOLIC BLOOD PRESSURE: 66 MMHG | SYSTOLIC BLOOD PRESSURE: 124 MMHG | RESPIRATION RATE: 20 BRPM | TEMPERATURE: 98 F | OXYGEN SATURATION: 99 % | BODY MASS INDEX: 27.41 KG/M2

## 2020-03-10 DIAGNOSIS — S93.401A RIGHT ANKLE SPRAIN: Primary | ICD-10-CM

## 2020-03-10 PROCEDURE — 99282 EMERGENCY DEPT VISIT SF MDM: CPT | Performed by: EMERGENCY MEDICINE

## 2020-03-10 PROCEDURE — 99283 EMERGENCY DEPT VISIT LOW MDM: CPT

## 2020-03-11 NOTE — DISCHARGE INSTRUCTIONS
Return to the ER for further concerns or worsening symptoms  Follow up with your primary care physician in 1-2 days  Keep ankle in splint while awake, use crutches until cleared by a physician

## 2020-03-11 NOTE — ED PROVIDER NOTES
History  Chief Complaint   Patient presents with    Ankle Pain     pt presents to the ed with a sprained ankle 3 months ago  pt states that it is not healing the way it should be and gets discomfort and swelling      Pt in the ER with c/o persistent right ankle pain and now with swelling  He was in the ER on 2/23 after he sustained an injury to the same ankle while playing basketball  xrays were neg for fractures, pt was placed in a splint and crutches and advised to f/u with ortho  Pt is yet to f/u, he has been wrapping the affected ankle tightly daily, only taking it off to shower  Pt has also been wearing a boot which he got from his mother  Pt also admits that he has a hx of frequent injuries to the same ankle from playing basketball in the past      History provided by:  Patient   used: No    Ankle Pain   Location:  Ankle and foot  Injury: yes    Ankle location:  R ankle  Pain details:     Quality:  Aching    Severity:  Mild    Onset quality:  Sudden    Progression:  Worsening  Chronicity:  Recurrent  Foreign body present:  No foreign bodies  Prior injury to area: Yes  Associated symptoms: decreased ROM    Associated symptoms: no back pain and no fever        Prior to Admission Medications   Prescriptions Last Dose Informant Patient Reported? Taking?    Ascorbic Acid (VITAMIN C PO)  Self Yes No   Sig: Take by mouth   Multiple Vitamins-Minerals (EYE VITAMINS PO)   Yes No   Sig: Take by mouth daily   Multiple Vitamins-Minerals (HAIR SKIN AND NAILS FORMULA PO)   Yes No   Sig: Take by mouth daily   Multiple Vitamins-Minerals (MULTIVITAMIN ADULT) TABS   Yes No   Sig: Take by mouth daily   Omega-3 Fatty Acids (FISH OIL PO)  Self Yes No   Sig: Take by mouth   glucosamine-chondroitin 500-400 MG tablet   Yes No   Sig: Take 1 tablet by mouth 3 (three) times a day      Facility-Administered Medications: None       Past Medical History:   Diagnosis Date    Anemia        Past Surgical History: Procedure Laterality Date    NO PAST SURGERIES         Family History   Problem Relation Age of Onset    No Known Problems Mother     No Known Problems Father      I have reviewed and agree with the history as documented  E-Cigarette/Vaping     E-Cigarette/Vaping Substances     Social History     Tobacco Use    Smoking status: Current Every Day Smoker     Types: E-Cigarettes    Smokeless tobacco: Never Used    Tobacco comment: vapes   Substance Use Topics    Alcohol use: Yes     Alcohol/week: 1 0 standard drinks     Types: 1 Standard drinks or equivalent per week     Comment: Occ   Drug use: No       Review of Systems   Constitutional: Negative for chills and fever  Respiratory: Negative for cough, shortness of breath and wheezing  Cardiovascular: Negative for chest pain and palpitations  Gastrointestinal: Negative for abdominal pain, constipation, diarrhea, nausea and vomiting  Genitourinary: Negative for dysuria, flank pain, hematuria and urgency  Musculoskeletal: Positive for gait problem  Negative for back pain and joint swelling  Skin: Negative for color change and rash  All other systems reviewed and are negative  Physical Exam  Physical Exam   Constitutional: He is oriented to person, place, and time  He appears well-developed and well-nourished  HENT:   Head: Normocephalic and atraumatic  Eyes: Pupils are equal, round, and reactive to light  EOM are normal    Musculoskeletal:        Right ankle: He exhibits normal range of motion, no swelling, no ecchymosis, no deformity, no laceration and normal pulse  Tenderness  Lateral malleolus tenderness found  No medial malleolus, no AITFL, no CF ligament, no posterior TFL, no head of 5th metatarsal and no proximal fibula tenderness found  Achilles tendon normal         Right foot: There is swelling   There is normal range of motion, no tenderness, no bony tenderness, normal capillary refill, no crepitus, no deformity and no laceration  + TTP over lateral malleolus  Swelling noted distally in the right foot, without associated cellulitis  No TTP to foot  Neurological: He is alert and oriented to person, place, and time  Skin: Skin is warm and dry  Psychiatric: He has a normal mood and affect  His behavior is normal  Judgment and thought content normal    Nursing note and vitals reviewed  Vital Signs  ED Triage Vitals [03/10/20 2012]   Temperature Pulse Respirations Blood Pressure SpO2   98 °F (36 7 °C) 60 20 124/66 99 %      Temp Source Heart Rate Source Patient Position - Orthostatic VS BP Location FiO2 (%)   Tympanic Monitor -- -- --      Pain Score       --           Vitals:    03/10/20 2012   BP: 124/66   Pulse: 60         Visual Acuity      ED Medications  Medications - No data to display    Diagnostic Studies  Results Reviewed     None                 No orders to display              Procedures  Procedures         ED Course                               MDM  Number of Diagnoses or Management Options  Right ankle sprain:   Diagnosis management comments: Foot swelling likely related to aggressive compression with ACE wrap  Xrays reviewed and neg for fx  Pt advised to keep affected ankle in splint and to f/u with ortho/pods as he is uninsured          Amount and/or Complexity of Data Reviewed  Tests in the radiology section of CPT®: reviewed    Risk of Complications, Morbidity, and/or Mortality  Presenting problems: low  Management options: low    Patient Progress  Patient progress: improved        Disposition  Final diagnoses:   Right ankle sprain     Time reflects when diagnosis was documented in both MDM as applicable and the Disposition within this note     Time User Action Codes Description Comment    3/10/2020  8:48 PM Howard Melendez Right ankle sprain       ED Disposition     ED Disposition Condition Date/Time Comment    Discharge Stable Tue Mar 10, 2020  8:48 PM Joslyn Sauceda discharge to home/self care  Follow-up Information     Follow up With Specialties Details Why Contact Info Additional Information    Dylan Blackwell MD Family Medicine Schedule an appointment as soon as possible for a visit in 2 days for follow up [address] [facility] [address] Orthopedic Surgery Schedule an appointment as soon as possible for a visit in 2 days for follow up [address], [place] [address] 500 [facility], [address], [place] [address], [place], [place], [address]    [address] [place] [facility] Schedule an appointment as soon as possible for a visit in 1 day for follow up [place]             Discharge Medication List as of 3/10/2020  8:50 PM      CONTINUE these medications which have NOT CHANGED    Details   Ascorbic Acid (VITAMIN C PO) Take by mouth, Historical Med      glucosamine-chondroitin 500-400 MG tablet Take 1 tablet by mouth 3 (three) times a day, Historical Med      Multiple Vitamins-Minerals (EYE VITAMINS PO) Take by mouth daily, Historical Med      !! Multiple Vitamins-Minerals (HAIR SKIN AND NAILS FORMULA PO) Take by mouth daily, Historical Med      !! Multiple Vitamins-Minerals (MULTIVITAMIN ADULT) TABS Take by mouth daily, Historical Med      Omega-3 Fatty Acids (FISH OIL PO) Take by mouth, Historical Med       !! - Potential duplicate medications found  Please discuss with provider  No discharge procedures on file      PDMP Review     None          ED Provider  Electronically Signed by           Bri Griffith DO  03/12/20 3166

## 2020-04-04 ENCOUNTER — OFFICE VISIT (OUTPATIENT)
Dept: OBGYN CLINIC | Facility: CLINIC | Age: 45
End: 2020-04-04
Payer: COMMERCIAL

## 2020-04-04 ENCOUNTER — APPOINTMENT (OUTPATIENT)
Dept: RADIOLOGY | Facility: CLINIC | Age: 45
End: 2020-04-04
Payer: COMMERCIAL

## 2020-04-04 VITALS
BODY MASS INDEX: 27.4 KG/M2 | SYSTOLIC BLOOD PRESSURE: 124 MMHG | HEIGHT: 67 IN | HEART RATE: 69 BPM | DIASTOLIC BLOOD PRESSURE: 82 MMHG | WEIGHT: 174.6 LBS

## 2020-04-04 DIAGNOSIS — S93.409A SPRAIN OF ANKLE, INITIAL ENCOUNTER: ICD-10-CM

## 2020-04-04 DIAGNOSIS — S82.61XA CLOSED AVULSION FRACTURE OF LATERAL MALLEOLUS OF RIGHT FIBULA, INITIAL ENCOUNTER: Primary | ICD-10-CM

## 2020-04-04 PROCEDURE — 73610 X-RAY EXAM OF ANKLE: CPT

## 2020-04-04 PROCEDURE — 3008F BODY MASS INDEX DOCD: CPT | Performed by: ORTHOPAEDIC SURGERY

## 2020-04-04 PROCEDURE — 99213 OFFICE O/P EST LOW 20 MIN: CPT | Performed by: ORTHOPAEDIC SURGERY

## 2020-10-27 ENCOUNTER — OFFICE VISIT (OUTPATIENT)
Dept: URGENT CARE | Facility: CLINIC | Age: 45
End: 2020-10-27
Payer: COMMERCIAL

## 2020-10-27 VITALS
HEIGHT: 67 IN | HEART RATE: 72 BPM | RESPIRATION RATE: 18 BRPM | WEIGHT: 172 LBS | BODY MASS INDEX: 27 KG/M2 | SYSTOLIC BLOOD PRESSURE: 139 MMHG | TEMPERATURE: 97.7 F | OXYGEN SATURATION: 100 % | DIASTOLIC BLOOD PRESSURE: 74 MMHG

## 2020-10-27 DIAGNOSIS — J02.9 ACUTE PHARYNGITIS, UNSPECIFIED ETIOLOGY: Primary | ICD-10-CM

## 2020-10-27 LAB — S PYO AG THROAT QL: NEGATIVE

## 2020-10-27 PROCEDURE — 99213 OFFICE O/P EST LOW 20 MIN: CPT | Performed by: PHYSICIAN ASSISTANT

## 2020-10-27 PROCEDURE — 87880 STREP A ASSAY W/OPTIC: CPT | Performed by: PHYSICIAN ASSISTANT

## 2020-10-27 PROCEDURE — 3008F BODY MASS INDEX DOCD: CPT | Performed by: FAMILY MEDICINE

## 2020-10-27 PROCEDURE — 87070 CULTURE OTHR SPECIMN AEROBIC: CPT | Performed by: PHYSICIAN ASSISTANT

## 2020-10-27 RX ORDER — PREDNISONE 20 MG/1
40 TABLET ORAL DAILY
Qty: 10 TABLET | Refills: 0 | Status: SHIPPED | OUTPATIENT
Start: 2020-10-27 | End: 2020-11-01

## 2020-10-29 LAB — BACTERIA THROAT CULT: NORMAL

## 2020-11-09 ENCOUNTER — TELEMEDICINE (OUTPATIENT)
Dept: FAMILY MEDICINE CLINIC | Facility: CLINIC | Age: 45
End: 2020-11-09
Payer: COMMERCIAL

## 2020-11-09 DIAGNOSIS — Z71.6 ENCOUNTER FOR SMOKING CESSATION COUNSELING: ICD-10-CM

## 2020-11-09 DIAGNOSIS — Z72.0 NICOTINE ABUSE: ICD-10-CM

## 2020-11-09 DIAGNOSIS — R13.10 ODYNOPHAGIA: Primary | ICD-10-CM

## 2020-11-09 PROCEDURE — 99213 OFFICE O/P EST LOW 20 MIN: CPT | Performed by: FAMILY MEDICINE

## 2020-11-19 ENCOUNTER — TELEPHONE (OUTPATIENT)
Dept: FAMILY MEDICINE CLINIC | Facility: CLINIC | Age: 45
End: 2020-11-19

## 2020-11-19 ENCOUNTER — TELEMEDICINE (OUTPATIENT)
Dept: FAMILY MEDICINE CLINIC | Facility: CLINIC | Age: 45
End: 2020-11-19
Payer: COMMERCIAL

## 2020-11-19 DIAGNOSIS — Z00.00 HEALTHCARE MAINTENANCE: Primary | ICD-10-CM

## 2020-11-19 DIAGNOSIS — R07.0 THROAT PAIN IN ADULT: Primary | ICD-10-CM

## 2020-11-19 DIAGNOSIS — R13.10 ODYNOPHAGIA: ICD-10-CM

## 2020-11-19 PROCEDURE — 4004F PT TOBACCO SCREEN RCVD TLK: CPT | Performed by: FAMILY MEDICINE

## 2020-11-19 PROCEDURE — 99213 OFFICE O/P EST LOW 20 MIN: CPT | Performed by: FAMILY MEDICINE

## 2020-11-19 RX ORDER — BENZOCAINE 6 MG-MENTHOL 10 MG LOZENGES
1 EVERY 2 HOUR PRN
Qty: 100 TABLET | Refills: 0 | Status: SHIPPED | OUTPATIENT
Start: 2020-11-19 | End: 2022-06-07

## 2020-11-22 ENCOUNTER — HOSPITAL ENCOUNTER (EMERGENCY)
Facility: HOSPITAL | Age: 45
Discharge: HOME | End: 2020-11-22
Attending: EMERGENCY MEDICINE
Payer: COMMERCIAL

## 2020-11-22 VITALS
TEMPERATURE: 97.5 F | RESPIRATION RATE: 16 BRPM | OXYGEN SATURATION: 100 % | HEIGHT: 68 IN | BODY MASS INDEX: 25.01 KG/M2 | SYSTOLIC BLOOD PRESSURE: 158 MMHG | WEIGHT: 165 LBS | DIASTOLIC BLOOD PRESSURE: 88 MMHG | HEART RATE: 50 BPM

## 2020-11-22 DIAGNOSIS — R07.0 THROAT PAIN IN ADULT: ICD-10-CM

## 2020-11-22 DIAGNOSIS — J02.9 SORE THROAT: Primary | ICD-10-CM

## 2020-11-22 PROCEDURE — 99282 EMERGENCY DEPT VISIT SF MDM: CPT

## 2020-11-22 PROCEDURE — 99284 EMERGENCY DEPT VISIT MOD MDM: CPT | Performed by: EMERGENCY MEDICINE

## 2020-11-22 RX ORDER — FAMOTIDINE 20 MG/1
20 TABLET, FILM COATED ORAL 2 TIMES DAILY
Qty: 30 TABLET | Refills: 0 | Status: SHIPPED | OUTPATIENT
Start: 2020-11-22 | End: 2020-11-22 | Stop reason: SDUPTHER

## 2020-11-22 RX ORDER — FAMOTIDINE 20 MG/1
20 TABLET, FILM COATED ORAL 2 TIMES DAILY
Qty: 30 TABLET | Refills: 0 | Status: SHIPPED | OUTPATIENT
Start: 2020-11-22 | End: 2020-12-08 | Stop reason: SDUPTHER

## 2020-12-08 ENCOUNTER — OFFICE VISIT (OUTPATIENT)
Dept: GASTROENTEROLOGY | Facility: CLINIC | Age: 45
End: 2020-12-08
Payer: COMMERCIAL

## 2020-12-08 VITALS
SYSTOLIC BLOOD PRESSURE: 124 MMHG | HEART RATE: 70 BPM | WEIGHT: 167.4 LBS | HEIGHT: 68 IN | TEMPERATURE: 97.1 F | DIASTOLIC BLOOD PRESSURE: 87 MMHG | BODY MASS INDEX: 25.37 KG/M2

## 2020-12-08 DIAGNOSIS — Z11.59 SPECIAL SCREENING EXAMINATION FOR VIRAL DISEASE: ICD-10-CM

## 2020-12-08 DIAGNOSIS — R13.10 ODYNOPHAGIA: ICD-10-CM

## 2020-12-08 DIAGNOSIS — J02.9 SORE THROAT: ICD-10-CM

## 2020-12-08 DIAGNOSIS — K21.9 GASTROESOPHAGEAL REFLUX DISEASE, UNSPECIFIED WHETHER ESOPHAGITIS PRESENT: Primary | ICD-10-CM

## 2020-12-08 DIAGNOSIS — K62.5 BRIGHT RED BLOOD PER RECTUM: ICD-10-CM

## 2020-12-08 PROCEDURE — 4004F PT TOBACCO SCREEN RCVD TLK: CPT | Performed by: INTERNAL MEDICINE

## 2020-12-08 PROCEDURE — 3008F BODY MASS INDEX DOCD: CPT | Performed by: INTERNAL MEDICINE

## 2020-12-08 PROCEDURE — 99244 OFF/OP CNSLTJ NEW/EST MOD 40: CPT | Performed by: INTERNAL MEDICINE

## 2020-12-08 RX ORDER — PANTOPRAZOLE SODIUM 40 MG/1
40 TABLET, DELAYED RELEASE ORAL DAILY
Qty: 30 TABLET | Refills: 3 | Status: SHIPPED | OUTPATIENT
Start: 2020-12-08 | End: 2022-06-07

## 2020-12-08 RX ORDER — FAMOTIDINE 20 MG/1
40 TABLET, FILM COATED ORAL
Qty: 30 TABLET | Refills: 3 | Status: SHIPPED | OUTPATIENT
Start: 2020-12-08 | End: 2021-02-23

## 2020-12-23 NOTE — PRE-PROCEDURE INSTRUCTIONS
Pre-Surgery Instructions:   Medication Instructions    famotidine (PEPCID) 20 mg tablet Patient was instructed by Physician and understands   pantoprazole (PROTONIX) 40 mg tablet Patient was instructed by Physician and understands

## 2020-12-29 DIAGNOSIS — Z11.59 SPECIAL SCREENING EXAMINATION FOR VIRAL DISEASE: ICD-10-CM

## 2020-12-29 PROCEDURE — U0003 INFECTIOUS AGENT DETECTION BY NUCLEIC ACID (DNA OR RNA); SEVERE ACUTE RESPIRATORY SYNDROME CORONAVIRUS 2 (SARS-COV-2) (CORONAVIRUS DISEASE [COVID-19]), AMPLIFIED PROBE TECHNIQUE, MAKING USE OF HIGH THROUGHPUT TECHNOLOGIES AS DESCRIBED BY CMS-2020-01-R: HCPCS | Performed by: INTERNAL MEDICINE

## 2020-12-31 LAB — SARS-COV-2 RNA SPEC QL NAA+PROBE: NOT DETECTED

## 2021-01-04 ENCOUNTER — TELEPHONE (OUTPATIENT)
Dept: GASTROENTEROLOGY | Facility: AMBULARY SURGERY CENTER | Age: 46
End: 2021-01-04

## 2021-01-04 ENCOUNTER — HOSPITAL ENCOUNTER (OUTPATIENT)
Dept: GASTROENTEROLOGY | Facility: AMBULARY SURGERY CENTER | Age: 46
Setting detail: OUTPATIENT SURGERY
Discharge: HOME | End: 2021-01-04
Attending: INTERNAL MEDICINE

## 2021-01-04 ENCOUNTER — TELEPHONE (OUTPATIENT)
Dept: GASTROENTEROLOGY | Facility: CLINIC | Age: 46
End: 2021-01-04

## 2021-01-04 DIAGNOSIS — R13.10 ODYNOPHAGIA: ICD-10-CM

## 2021-01-04 DIAGNOSIS — K62.5 BRIGHT RED BLOOD PER RECTUM: ICD-10-CM

## 2021-01-04 DIAGNOSIS — J02.9 SORE THROAT: ICD-10-CM

## 2021-01-04 DIAGNOSIS — K21.9 GASTROESOPHAGEAL REFLUX DISEASE, UNSPECIFIED WHETHER ESOPHAGITIS PRESENT: ICD-10-CM

## 2021-01-04 RX ORDER — SODIUM CHLORIDE, SODIUM LACTATE, POTASSIUM CHLORIDE, CALCIUM CHLORIDE 600; 310; 30; 20 MG/100ML; MG/100ML; MG/100ML; MG/100ML
125 INJECTION, SOLUTION INTRAVENOUS CONTINUOUS
Status: CANCELLED | OUTPATIENT
Start: 2021-02-25

## 2021-01-04 NOTE — TELEPHONE ENCOUNTER
Patients GI provider:  Dr Rivera Mckeon     Number to return call: (  [phone]    Reason for call: Pt had orange juice  so he was told to rs his colon / egd for today---please assist    Scheduled procedure/appointment date if applicable: Apt/procedure 1-4-21

## 2021-01-04 NOTE — TELEPHONE ENCOUNTER
PT is rescheduled from today to 2/25/2021 w/ dr Grace Mon for egd/colonoscopy at [facility]/suprep sample given/COVID TEST ORDERED FOR 2/19/2021/PT AWARE

## 2021-01-04 NOTE — TELEPHONE ENCOUNTER
Patient 2nd call;    Pt  needs to ara his EGD/ Colonoscopy due to drinking OJ this morning please contact pt

## 2021-01-04 NOTE — TELEPHONE ENCOUNTER
Spoke to pt   Pt is rescheduled from 1/4/2021 at [facility] to 2/25/2021 w/ dr Troy Chavez for egd/colonoscopy/pt will  suprep sample sample fro [place] office/COVID TEST 2/19/2021/PT AWARE

## 2021-02-19 DIAGNOSIS — Z11.59 SCREENING FOR VIRAL DISEASE: ICD-10-CM

## 2021-02-19 PROCEDURE — 87635 SARS-COV-2 COVID-19 AMP PRB: CPT

## 2021-02-21 LAB — SARS-COV-2 RNA RESP QL NAA+PROBE: NEGATIVE

## 2021-02-25 ENCOUNTER — ANESTHESIA (OUTPATIENT)
Dept: GASTROENTEROLOGY | Facility: AMBULARY SURGERY CENTER | Age: 46
End: 2021-02-25

## 2021-02-25 ENCOUNTER — ANESTHESIA EVENT (OUTPATIENT)
Dept: GASTROENTEROLOGY | Facility: AMBULARY SURGERY CENTER | Age: 46
End: 2021-02-25

## 2021-02-25 ENCOUNTER — HOSPITAL ENCOUNTER (OUTPATIENT)
Dept: GASTROENTEROLOGY | Facility: AMBULARY SURGERY CENTER | Age: 46
Setting detail: OUTPATIENT SURGERY
Discharge: HOME | End: 2021-02-25
Attending: INTERNAL MEDICINE | Admitting: INTERNAL MEDICINE
Payer: COMMERCIAL

## 2021-02-25 VITALS
DIASTOLIC BLOOD PRESSURE: 85 MMHG | HEIGHT: 68 IN | WEIGHT: 167 LBS | OXYGEN SATURATION: 98 % | SYSTOLIC BLOOD PRESSURE: 147 MMHG | RESPIRATION RATE: 18 BRPM | HEART RATE: 58 BPM | TEMPERATURE: 96.8 F | BODY MASS INDEX: 25.31 KG/M2

## 2021-02-25 VITALS — HEART RATE: 70 BPM

## 2021-02-25 DIAGNOSIS — K62.5 BRIGHT RED BLOOD PER RECTUM: ICD-10-CM

## 2021-02-25 DIAGNOSIS — K21.9 GASTROESOPHAGEAL REFLUX DISEASE, UNSPECIFIED WHETHER ESOPHAGITIS PRESENT: ICD-10-CM

## 2021-02-25 DIAGNOSIS — R13.10 ODYNOPHAGIA: ICD-10-CM

## 2021-02-25 DIAGNOSIS — J02.9 SORE THROAT: ICD-10-CM

## 2021-02-25 PROCEDURE — 88305 TISSUE EXAM BY PATHOLOGIST: CPT | Performed by: PATHOLOGY

## 2021-02-25 PROCEDURE — 45378 DIAGNOSTIC COLONOSCOPY: CPT | Performed by: INTERNAL MEDICINE

## 2021-02-25 PROCEDURE — 43239 EGD BIOPSY SINGLE/MULTIPLE: CPT | Performed by: INTERNAL MEDICINE

## 2021-02-25 RX ORDER — SODIUM CHLORIDE, SODIUM LACTATE, POTASSIUM CHLORIDE, CALCIUM CHLORIDE 600; 310; 30; 20 MG/100ML; MG/100ML; MG/100ML; MG/100ML
125 INJECTION, SOLUTION INTRAVENOUS CONTINUOUS
Status: DISCONTINUED | OUTPATIENT
Start: 2021-02-25 | End: 2021-03-01 | Stop reason: HOSPADM

## 2021-02-25 RX ORDER — HYDROCORTISONE 25 MG/G
CREAM TOPICAL 2 TIMES DAILY
Qty: 30 G | Refills: 1 | Status: SHIPPED | OUTPATIENT
Start: 2021-02-25 | End: 2022-06-07

## 2021-02-25 RX ORDER — PROPOFOL 10 MG/ML
INJECTION, EMULSION INTRAVENOUS CONTINUOUS PRN
Status: DISCONTINUED | OUTPATIENT
Start: 2021-02-25 | End: 2021-02-25

## 2021-02-25 RX ORDER — PROPOFOL 10 MG/ML
INJECTION, EMULSION INTRAVENOUS AS NEEDED
Status: DISCONTINUED | OUTPATIENT
Start: 2021-02-25 | End: 2021-02-25

## 2021-02-25 RX ADMIN — LIDOCAINE HYDROCHLORIDE 20 MG: 20 INJECTION, SOLUTION INTRAVENOUS at 09:15

## 2021-02-25 RX ADMIN — PROPOFOL 200 MG: 10 INJECTION, EMULSION INTRAVENOUS at 09:15

## 2021-02-25 RX ADMIN — SODIUM CHLORIDE, SODIUM LACTATE, POTASSIUM CHLORIDE, AND CALCIUM CHLORIDE: .6; .31; .03; .02 INJECTION, SOLUTION INTRAVENOUS at 09:09

## 2021-02-25 RX ADMIN — PROPOFOL 180 MCG/KG/MIN: 10 INJECTION, EMULSION INTRAVENOUS at 09:15

## 2021-02-25 RX ADMIN — SODIUM CHLORIDE, SODIUM LACTATE, POTASSIUM CHLORIDE, AND CALCIUM CHLORIDE 125 ML/HR: .6; .31; .03; .02 INJECTION, SOLUTION INTRAVENOUS at 08:35

## 2021-02-25 NOTE — H&P
History and Physical - [facility]  Dallas Calabrese 41 y o  male MRN: [id number]    HPI: Dallas Calabrese is a 41y o  year old male who presents with globus sensation and rectal bleeding  Review of Systems    Historical Information   Past Medical History:   Diagnosis Date    Anemia     GERD (gastroesophageal reflux disease)      Past Surgical History:   Procedure Laterality Date    DENTAL SURGERY      NO PAST SURGERIES      WISDOM TOOTH EXTRACTION       Social History   Social History     Substance and Sexual Activity   Alcohol Use Yes    Alcohol/week: 1 0 standard drinks    Types: 1 Standard drinks or equivalent per week    Frequency: 2-4 times a month    Comment: Occ  Social History     Substance and Sexual Activity   Drug Use No     Social History     Tobacco Use   Smoking Status Current Every Day Smoker    Types: E-Cigarettes   Smokeless Tobacco Never Used   Tobacco Comment    vapes     Family History   Problem Relation Age of Onset    No Known Problems Mother     Cancer Father        Meds/Allergies     (Not in a hospital admission)      No Known Allergies    Objective     /84   Pulse (!) 54   Temp (!) 96 8 °F (36 °C) (Temporal)   Resp 16   Ht 5' 7 5" (1 715 m)   Wt 75 8 kg (167 lb)   SpO2 100%   BMI 25 77 kg/m²       PHYSICAL EXAM    Gen: NAD  CV: RRR  CHEST: Clear  ABD: soft, NT/ND  EXT: no edema  Neuro: AAO      ASSESSMENT/PLAN:  This is a 41y o  year old male here for  globus sensation and rectal bleeding         PLAN:   Procedure: egd/colonoscopy

## 2021-02-25 NOTE — ANESTHESIA PREPROCEDURE EVALUATION
Procedure:  COLONOSCOPY  EGD    Relevant Problems   GI/HEPATIC   (+) Bright red blood per rectum   (+) GERD (gastroesophageal reflux disease)      MUSCULOSKELETAL   (+) Lumbar back pain        Physical Exam    Airway    Mallampati score: II  TM Distance: >3 FB  Neck ROM: full     Dental   No notable dental hx     Cardiovascular  Cardiovascular exam normal    Pulmonary  Pulmonary exam normal     Other Findings        Anesthesia Plan  ASA Score- 2     Anesthesia Type- IV sedation with anesthesia with ASA Monitors  Additional Monitors:   Airway Plan:           Plan Factors-Exercise tolerance (METS): >4 METS  Chart reviewed  Imaging results reviewed  Existing labs reviewed  Patient summary reviewed  Patient is not a current smoker  Induction-     Postoperative Plan-     Informed Consent- Anesthetic plan and risks discussed with patient  I personally reviewed this patient with the CRNA  Discussed and agreed on the Anesthesia Plan with the CRNA  Hubert Lang

## 2021-02-25 NOTE — ANESTHESIA POSTPROCEDURE EVALUATION
Post-Op Assessment Note    CV Status:  Stable  Pain Score: 0    Pain management: adequate     Mental Status:  Sleepy   Hydration Status:  Stable and euvolemic   PONV Controlled:  None   Airway Patency:  Patent       Staff: CRNA         No complications documented      BP  103/59   Temp  36 0C   Pulse 60   Resp 20   SpO2 99%ra

## 2021-03-01 ENCOUNTER — TELEPHONE (OUTPATIENT)
Dept: FAMILY MEDICINE CLINIC | Facility: CLINIC | Age: 46
End: 2021-03-01

## 2021-03-01 NOTE — TELEPHONE ENCOUNTER
Messaged clerical staff to have patient scheduled for virtual visit to discuss results of EGD and colonoscopy

## 2021-03-01 NOTE — TELEPHONE ENCOUNTER
----- Message from Nate Ryan DO sent at 3/1/2021  9:04 AM EST -----    Please schedule this  patient for virtual visit to discuss EGD and colonoscopy results, please call patient with appointment time thank you

## 2021-03-08 ENCOUNTER — TELEMEDICINE (OUTPATIENT)
Dept: FAMILY MEDICINE CLINIC | Facility: CLINIC | Age: 46
End: 2021-03-08
Payer: COMMERCIAL

## 2021-03-08 DIAGNOSIS — M25.532 BILATERAL WRIST PAIN: Primary | ICD-10-CM

## 2021-03-08 DIAGNOSIS — Z71.2 ENCOUNTER TO DISCUSS COLONOSCOPY RESULTS: ICD-10-CM

## 2021-03-08 DIAGNOSIS — M25.531 BILATERAL WRIST PAIN: Primary | ICD-10-CM

## 2021-03-08 PROCEDURE — 4004F PT TOBACCO SCREEN RCVD TLK: CPT | Performed by: FAMILY MEDICINE

## 2021-03-08 PROCEDURE — 99213 OFFICE O/P EST LOW 20 MIN: CPT | Performed by: FAMILY MEDICINE

## 2021-03-08 NOTE — TELEPHONE ENCOUNTER
S/w Patient, scheduled Virtual Visit to discuss EGD and Colonoscopy Results with Dr Luana Aschoff this evening

## 2021-03-08 NOTE — PROGRESS NOTES
Virtual Brief Visit    Assessment/Plan:    41years old male presents via telemedicine visit to discuss recent colonoscopy results and chronic bilateral wrist pain  For chronic wrist pain  Bilateral x-ray ordered to rule out fracture and other the bony abnormality  If x-rays are within normal limit, order referral to PT  In the meanwhile, patient instructed to wear braces  Colonoscopy   GI results note reviewed  Upper endoscopy negative for H pylori, colonoscopy within normal limit  The need for repeat colonoscopy in 10 years discussed  Problem List Items Addressed This Visit     None      Visit Diagnoses     Bilateral wrist pain    -  Primary    Relevant Orders    XR wrist 3+ vw left    XR wrist 3+ vw right    Encounter to discuss colonoscopy results                    Reason for visit is   Chief Complaint   Patient presents with    Virtual Brief Visit        Encounter provider Irish Goncalves DO    Provider located at [address] [place] [id number]    Recent Visits  Date Type Provider Dept   03/01/21 Telephone DO Tyrone Lopez   03/01/21 Telephone DO Tyrone Lopez   Showing recent visits within past 7 days and meeting all other requirements     Today's Visits  Date Type Provider Dept   03/08/21 Telemedicine DO Tyrone Monteiro   Showing today's visits and meeting all other requirements     Future Appointments  No visits were found meeting these conditions  Showing future appointments within next 150 days and meeting all other requirements        After connecting through Site [employer] and patient was informed that this is a secure, HIPAA-compliant platform  He agrees to proceed  , the patient was identified by name and date of birth   [facility] was informed that this is a telemedicine visit and that the visit is being conducted through [employer] and patient was informed that this is a secure, HIPAA-compliant platform  He agrees to proceed     My office door was closed  No one else was in the room  He acknowledged consent and understanding of privacy and security of the platform  The patient has agreed to participate and understands he can discontinue the visit at any time  Patient is aware this is a billable service  Subjective    Haja Dudley is a 41 y o  maleWith PMH of GERD and anemia present to follow-up on recent colonoscopy results and report the complain of bilateral wrist pain for the past 2 years, no prior evaluation done denies trauma  Wrist Pain   This is a new problem  The current episode started more than 1 year ago  There has been no history of extremity trauma  The problem occurs daily  The problem has been gradually worsening  The quality of the pain is described as sharp  The pain is at a severity of 10/10  Pertinent negatives include no limited range of motion, numbness or tingling  The symptoms are aggravated by activity (pushing heavy object, does appliance repair)  Treatments tried: braces  The treatment provided mild relief  There is no history of diabetes, gout or osteoarthritis          Past Medical History:   Diagnosis Date    Anemia     GERD (gastroesophageal reflux disease)        Past Surgical History:   Procedure Laterality Date    DENTAL SURGERY      NO PAST SURGERIES      WISDOM TOOTH EXTRACTION         Current Outpatient Medications   Medication Sig Dispense Refill    Benzocaine-Menthol (Chloraseptic Sore Throat) 6-10 MG lozenge Take 1 lozenge by mouth every 2 (two) hours as needed for sore throat 100 tablet 0    hydrocortisone (ANUSOL-HC) 2 5 % rectal cream Apply topically 2 (two) times a day 30 g 1    pantoprazole (PROTONIX) 40 mg tablet Take 1 tablet (40 mg total) by mouth daily 30 tablet 3    polyethylene glycol (GOLYTELY) 4000 mL solution Take 4,000 mL by mouth once for 1 dose 4000 mL 0     No current facility-administered medications for this visit  No Known Allergies    Review of Systems   Musculoskeletal: Negative for gout  Neurological: Negative for tingling and numbness  There were no vitals filed for this visit  I spent 15 minutes directly with the patient during this visit    VIRTUAL VISIT DISCLAIMER    Mik Patiño acknowledges that he has consented to an online visit or consultation  He understands that the online visit is based solely on information provided by him, and that, in the absence of a face-to-face physical evaluation by the physician, the diagnosis he receives is both limited and provisional in terms of accuracy and completeness  This is not intended to replace a full medical face-to-face evaluation by the physician  Mik Patiño understands and accepts these terms

## 2021-04-01 ENCOUNTER — APPOINTMENT (OUTPATIENT)
Dept: RADIOLOGY | Facility: CLINIC | Age: 46
End: 2021-04-01
Payer: COMMERCIAL

## 2021-04-01 DIAGNOSIS — M25.531 BILATERAL WRIST PAIN: ICD-10-CM

## 2021-04-01 DIAGNOSIS — M25.532 BILATERAL WRIST PAIN: ICD-10-CM

## 2021-04-01 PROCEDURE — 73110 X-RAY EXAM OF WRIST: CPT

## 2021-04-26 DIAGNOSIS — M25.531 PAIN IN BOTH WRISTS: Primary | ICD-10-CM

## 2021-04-26 DIAGNOSIS — M25.532 PAIN IN BOTH WRISTS: Primary | ICD-10-CM

## 2021-04-26 NOTE — PROGRESS NOTES
Bilateral wrist pain for 1 year, exacerbated by lifting heavy objects, denies numbness tingling  Bilateral wrist x-ray reviewed, within normal limit   PT ordered  Discussed with patient on the phone, verbalizes understanding

## 2021-06-02 ENCOUNTER — OFFICE VISIT (OUTPATIENT)
Dept: FAMILY MEDICINE CLINIC | Facility: CLINIC | Age: 46
End: 2021-06-02
Payer: COMMERCIAL

## 2021-06-02 VITALS
DIASTOLIC BLOOD PRESSURE: 62 MMHG | OXYGEN SATURATION: 98 % | TEMPERATURE: 96.7 F | WEIGHT: 164.4 LBS | BODY MASS INDEX: 24.35 KG/M2 | HEART RATE: 59 BPM | SYSTOLIC BLOOD PRESSURE: 118 MMHG | HEIGHT: 69 IN | RESPIRATION RATE: 18 BRPM

## 2021-06-02 DIAGNOSIS — Z00.00 ANNUAL PHYSICAL EXAM: Primary | ICD-10-CM

## 2021-06-02 DIAGNOSIS — Z13.1 SCREENING FOR DIABETES MELLITUS: ICD-10-CM

## 2021-06-02 DIAGNOSIS — R53.83 FATIGUE, UNSPECIFIED TYPE: ICD-10-CM

## 2021-06-02 DIAGNOSIS — H53.8 BLURRY VISION: ICD-10-CM

## 2021-06-02 DIAGNOSIS — R35.89 POLYURIA: ICD-10-CM

## 2021-06-02 DIAGNOSIS — Z83.3 FAMILY HISTORY OF DIABETES MELLITUS IN FATHER: ICD-10-CM

## 2021-06-02 PROCEDURE — 3725F SCREEN DEPRESSION PERFORMED: CPT | Performed by: FAMILY MEDICINE

## 2021-06-02 PROCEDURE — 99396 PREV VISIT EST AGE 40-64: CPT | Performed by: FAMILY MEDICINE

## 2021-06-02 PROCEDURE — 4004F PT TOBACCO SCREEN RCVD TLK: CPT | Performed by: FAMILY MEDICINE

## 2021-06-02 PROCEDURE — 3008F BODY MASS INDEX DOCD: CPT | Performed by: FAMILY MEDICINE

## 2021-06-02 NOTE — PATIENT INSTRUCTIONS

## 2021-06-02 NOTE — PROGRESS NOTES
[address] [facility]    NAME: Epi Born  AGE: 40 y o  SEX: male  : 1975     DATE: 2021     Assessment and Plan:     Problem List Items Addressed This Visit     None      Visit Diagnoses     Annual physical exam    -  Primary    Fatigue, unspecified type        Relevant Orders    CBC and differential    Screening for diabetes mellitus        Relevant Orders    Basic metabolic panel    Polyuria        Relevant Orders    Basic metabolic panel    Blurry vision        Relevant Orders    Basic metabolic panel    Family history of diabetes mellitus in father          · RTO in 1 year or sooner if needed    Immunizations and preventive care screenings were discussed with patient today  Appropriate education was printed on patient's after visit summary  Counseling:  Alcohol/drug use: discussed moderation in alcohol intake, the recommendations for healthy alcohol use, and avoidance of illicit drug use  Dental Health: discussed importance of regular tooth brushing, flossing, and dental visits  · Exercise: the importance of regular exercise/physical activity was discussed  Recommend exercise 3-5 times per week for at least 30 minutes  BMI Counseling: Body mass index is 24 28 kg/m²  The BMI is above normal  Nutrition recommendations include encouraging healthy choices of fruits and vegetables, decreasing fast food intake, consuming healthier snacks and limiting drinks that contain sugar  Exercise recommendations include moderate physical activity 150 minutes/week and exercising 3-5 times per week  Tobacco Cessation Counseling: Tobacco cessation counseling was provided  The patient is sincerely urged to quit consumption of tobacco  He is not ready to quit tobacco        Return in 1 year (on 2022)       Chief Complaint:     Chief Complaint   Patient presents with    Physical Exam     would like to be tested for Diabetes, states he has blood in his urine when he drinks, and blurry vision      History of Present Illness:     Adult Annual Physical   Patient here for a comprehensive physical exam  The patient reports problems - blood in stool  Concerned for diabetes given family history of diabetes and symptoms (blurry vision, pressure on eyes, polyuria, polydipsia)  He also reports feeling fatigue  Patient had a colonoscopy in January which revealed internal and external hemorrhoids  Diet and Physical Activity  · Diet/Nutrition: poor diet, high fat diet and limited fruits/vegetables  · Exercise: no formal exercise  Depression Screening  PHQ-9 Depression Screening    PHQ-9:   Frequency of the following problems over the past two weeks:      Little interest or pleasure in doing things: 0 - not at all  Feeling down, depressed, or hopeless: 0 - not at all  PHQ-2 Score: 0       General Health  · Sleep: sleeps well  · Hearing: normal - bilateral   · Vision: most recent eye exam >1 year ago and wears glasses  · Dental: regular dental visits, brushes teeth once daily and flosses teeth occasionally   Health  · Symptoms include: urinary frequency     Review of Systems:     Review of Systems   Constitutional: Negative for activity change and appetite change  Gastrointestinal: Negative for abdominal pain, constipation, diarrhea, nausea and vomiting  Endocrine: Positive for polydipsia and polyuria  Genitourinary: Positive for frequency  Negative for difficulty urinating, discharge, dysuria and flank pain        Past Medical History:     Past Medical History:   Diagnosis Date    Anemia     GERD (gastroesophageal reflux disease)       Past Surgical History:     Past Surgical History:   Procedure Laterality Date    DENTAL SURGERY      NO PAST SURGERIES      WISDOM TOOTH EXTRACTION        Family History:     Family History   Problem Relation Age of Onset    No Known Problems Mother     Cancer Father     Diabetes Father    Jorge Bradshaw Diabetes Paternal Aunt       Social History:     E-Cigarette/Vaping    E-Cigarette Use Current Every Day User      E-Cigarette/Vaping Substances    Nicotine Yes     THC No     CBD Yes     Flavoring No     Other No     Unknown No      Social History     Socioeconomic History    Marital status: /Civil Union     Spouse name: None    Number of children: None    Years of education: None    Highest education level: None   Occupational History    None   Social Needs    Financial resource strain: None    Food insecurity     Worry: None     Inability: None    Transportation needs     Medical: None     Non-medical: None   Tobacco Use    Smoking status: Current Every Day Smoker     Types: E-Cigarettes    Smokeless tobacco: Never Used    Tobacco comment: vapes, used to smoke cigarretes 0 25 ppd for 15-20 years   Substance and Sexual Activity    Alcohol use: Yes     Alcohol/week: 1 0 standard drinks     Types: 1 Standard drinks or equivalent per week     Frequency: 2-3 times a week     Drinks per session: 1 or 2     Binge frequency: Never     Comment: Occ      Drug use: Yes     Types: Marijuana    Sexual activity: Yes     Partners: Female     Birth control/protection: None     Comment: Wife   Lifestyle    Physical activity     Days per week: None     Minutes per session: None    Stress: None   Relationships    Social connections     Talks on phone: None     Gets together: None     Attends [employer] service: None     Active member of club or organization: None     Attends meetings of clubs or organizations: None     Relationship status: None    Intimate partner violence     Fear of current or ex partner: None     Emotionally abused: None     Physically abused: None     Forced sexual activity: None   Other Topics Concern    None   Social History Narrative    None      Current Medications:     Current Outpatient Medications   Medication Sig Dispense Refill    pantoprazole (PROTONIX) 40 mg tablet Take 1 tablet (40 mg total) by mouth daily 30 tablet 3    Benzocaine-Menthol (Chloraseptic Sore Throat) 6-10 MG lozenge Take 1 lozenge by mouth every 2 (two) hours as needed for sore throat (Patient not taking: Reported on 6/2/2021) 100 tablet 0    hydrocortisone (ANUSOL-HC) 2 5 % rectal cream Apply topically 2 (two) times a day (Patient not taking: Reported on 6/2/2021) 30 g 1    polyethylene glycol (GOLYTELY) 4000 mL solution Take 4,000 mL by mouth once for 1 dose 4000 mL 0     No current facility-administered medications for this visit  Allergies:     No Known Allergies   Physical Exam:     /62 (BP Location: Left arm, Patient Position: Sitting, Cuff Size: Adult)   Pulse 59   Temp (!) 96 7 °F (35 9 °C) (Tympanic)   Resp 18   Ht 5' 9" (1 753 m)   Wt 74 6 kg (164 lb 6 4 oz)   SpO2 98%   BMI 24 28 kg/m²     Physical Exam  Vitals signs reviewed  Constitutional:       General: He is awake  He is not in acute distress  HENT:      Right Ear: Tympanic membrane and ear canal normal       Left Ear: Tympanic membrane and ear canal normal    Neck:      Thyroid: No thyroid mass or thyroid tenderness  Cardiovascular:      Rate and Rhythm: Normal rate and regular rhythm  Heart sounds: Normal heart sounds, S1 normal and S2 normal    Pulmonary:      Effort: Pulmonary effort is normal       Breath sounds: Normal breath sounds  No decreased breath sounds, wheezing, rhonchi or rales  Abdominal:      General: Abdomen is flat  Bowel sounds are normal       Palpations: Abdomen is soft  Tenderness: There is no abdominal tenderness  Lymphadenopathy:      Cervical: No cervical adenopathy  Right cervical: No superficial cervical adenopathy  Left cervical: No superficial cervical adenopathy  Neurological:      Mental Status: He is alert  Psychiatric:         Behavior: Behavior is cooperative            Judith Orona MD  [address]

## 2021-06-08 ENCOUNTER — EVALUATION (OUTPATIENT)
Dept: PHYSICAL THERAPY | Facility: CLINIC | Age: 46
End: 2021-06-08
Payer: COMMERCIAL

## 2021-06-08 DIAGNOSIS — M25.532 PAIN IN BOTH WRISTS: Primary | ICD-10-CM

## 2021-06-08 DIAGNOSIS — M25.531 PAIN IN BOTH WRISTS: Primary | ICD-10-CM

## 2021-06-08 PROCEDURE — 97161 PT EVAL LOW COMPLEX 20 MIN: CPT

## 2021-06-08 NOTE — PROGRESS NOTES
ASSESSMENT:   Trinh Oden is a pleasant 40 y o  male who presents with referring diagnosis of bilateral wrist pain  The patient's greatest concerns are continued pain/disability, inability to work  Primary movement impairment diagnosis of hypomobility resulting in pathoanatomical symptoms of pain, restricted ROM, and strength deficits  The previously identified impairments have limited the patient's ability to lift his work bag or shoot a basketball without wrist pain  At this time, the patient has seen spontaneous improvement in his wrist pain, unable to recreate his pain during today's session  He was provided w/ HEP emphasizing wrist strengthening and ROM, patient expressed good understanding  No further referral is necessary at this time based upon examination results  PT POC discussed with patient during today's evaluation  Due to low sx irritability, he was encouraged to complete HEP and monitor symptoms independently for 2 weeks  At that time, further determination for PT necessity will be made  CASE SUMMARY:   Trinh Oden is a 40y o  year old male reports to physical therapy with one year history of intermittent bilateral wrist pain  PMHx includes: See chart for full details with medications  The following is a summary of the patient's status:    He reports this pain is intermittent wrist pain; "it comes and goes, it was going on for months before this and now it's getting better and now I don't feel it as much " Pain with gripping activities, such as picking up a bag or turning objects  He states he is wearing bilateral wrist sleeves and wraps to assist with his pain  He states his left hand is usually worse  Patient reports increased pain with doing pushups, follow through w/ basketball, turning objects (opening a jar)  He states that he has seen improvement in his wrist pain over the last few days, not even needing his wrist supports   Patient works on [employer], frequently having to push and pull objects, turning a screw  hurts  Patient reports pain is around bilateral joint lines  Splinting and ice helps  Pain quality varies from sharp (with pressure) to dull (with sleep)  Patient states he sleeps on his arms, which causes discomfort  (-) changes in B&B habits, recent infection  (+) 5 lbs weight loss, disturbed sleep  (-) flick sign    Patient is right handed  Greatest concern regarding wrist pain: inability to work        ROM: Bryce Hurt presents with decreased ROM in the wrist and fingers  Left Wrist  Wrist Extension= WFL   Radial Deviation= WFL  Supination= WFL  Wrist Flexion    = WFL    Ulnar Deviation  = WFL Pronation  = WFL      Right Wrist  Wrist Extension= WFL   Radial Deviation= WFL  Supination= WFL  Wrist Flexion    = WFL    Ulnar Deviation  = WFL Pronation  = WFL      STRENGTH: Bryce Hurt presents with decreased wrist and hand strength  Wrist Extension: 4+/5  Wrist Flexion:      4+/5  Wrist pronation/supination: 4+/5 bilaterally     Strength: R= 65 lbs L= 75 lbs  Lateral Pinch: R= 16  L= 14 lbs  3 Point Pinch: R= 10  L= 10 lbs    Joint Mobility Assessment: slight hypomobility through joints of R wrist, moderate hypomobility of joints through left wrist      PAIN LEVEL:  Current: 0/10            At Best: 0/10    At worst: 3/10   Location:  bilateral wrist, joint line  SENSATION:  Bryce Hurt reports no sensory problems  No numbness reported  FUNCTIONAL SUMMARY:   Sonia Guerrero is independent in basic self care skills  He/She has difficulty with lifting, cooking, and cleaning tasks  FOTO score is 47%         Short Term Goals: to be completed in 2 weeks  1  Increase ROM of wrist to WNLs, Wrist ext 65, flexion 65, supination 80   2  Increase wrist strength to 5/5 and hand strength R= 50% of L   3  Decrease pain to 0-3/10   4  Patient will report compliance with HEP  Long Term Goals: To be completed in 4 weeks    1  Ability to perform lifting, carrying, cooking,cleaning tasks and work tasks with minimal to no discomfort,   2  Patient demonstrates good carryover of HEP,   3  Minimal to no pain complaints    4   FOTO will improve to >66/100

## 2021-12-02 ENCOUNTER — OFFICE VISIT (OUTPATIENT)
Dept: FAMILY MEDICINE CLINIC | Facility: CLINIC | Age: 46
End: 2021-12-02
Payer: COMMERCIAL

## 2021-12-02 VITALS
WEIGHT: 171 LBS | OXYGEN SATURATION: 98 % | HEART RATE: 60 BPM | SYSTOLIC BLOOD PRESSURE: 116 MMHG | HEIGHT: 68 IN | BODY MASS INDEX: 25.91 KG/M2 | DIASTOLIC BLOOD PRESSURE: 80 MMHG | RESPIRATION RATE: 18 BRPM | TEMPERATURE: 98.2 F

## 2021-12-02 DIAGNOSIS — R10.9 FLANK PAIN: ICD-10-CM

## 2021-12-02 DIAGNOSIS — M25.531 BILATERAL WRIST PAIN: ICD-10-CM

## 2021-12-02 DIAGNOSIS — K21.9 GASTROESOPHAGEAL REFLUX DISEASE, UNSPECIFIED WHETHER ESOPHAGITIS PRESENT: ICD-10-CM

## 2021-12-02 DIAGNOSIS — Z13.1 SCREENING FOR DIABETES MELLITUS: ICD-10-CM

## 2021-12-02 DIAGNOSIS — R07.89 CHEST DISCOMFORT: Primary | ICD-10-CM

## 2021-12-02 DIAGNOSIS — M25.532 BILATERAL WRIST PAIN: ICD-10-CM

## 2021-12-02 DIAGNOSIS — Z11.59 NEED FOR HEPATITIS C SCREENING TEST: ICD-10-CM

## 2021-12-02 DIAGNOSIS — Z11.4 SCREENING FOR HIV (HUMAN IMMUNODEFICIENCY VIRUS): ICD-10-CM

## 2021-12-02 DIAGNOSIS — R35.89 POLYURIA: ICD-10-CM

## 2021-12-02 DIAGNOSIS — M21.619 BUNION: ICD-10-CM

## 2021-12-02 DIAGNOSIS — I51.7 LVH (LEFT VENTRICULAR HYPERTROPHY): ICD-10-CM

## 2021-12-02 DIAGNOSIS — K21.9 GASTROESOPHAGEAL REFLUX DISEASE WITHOUT ESOPHAGITIS: ICD-10-CM

## 2021-12-02 DIAGNOSIS — R07.9 CHEST PAIN, UNSPECIFIED TYPE: ICD-10-CM

## 2021-12-02 DIAGNOSIS — Z13.228 SCREENING FOR METABOLIC DISORDER: ICD-10-CM

## 2021-12-02 DIAGNOSIS — R00.1 BRADYCARDIA: ICD-10-CM

## 2021-12-02 LAB
SL AMB  POCT GLUCOSE, UA: NORMAL
SL AMB LEUKOCYTE ESTERASE,UA: NEGATIVE
SL AMB POCT BILIRUBIN,UA: NEGATIVE
SL AMB POCT BLOOD,UA: NEGATIVE
SL AMB POCT CLARITY,UA: CLEAR
SL AMB POCT COLOR,UA: NORMAL
SL AMB POCT GLUCOSE BLD: 98
SL AMB POCT HEMOGLOBIN AIC: 5.5 (ref ?–6.5)
SL AMB POCT KETONES,UA: NEGATIVE
SL AMB POCT NITRITE,UA: NEGATIVE
SL AMB POCT PH,UA: 6.5
SL AMB POCT SPECIFIC GRAVITY,UA: 1.01
SL AMB POCT URINE PROTEIN: NEGATIVE
SL AMB POCT UROBILINOGEN: NORMAL

## 2021-12-02 PROCEDURE — 81002 URINALYSIS NONAUTO W/O SCOPE: CPT | Performed by: STUDENT IN AN ORGANIZED HEALTH CARE EDUCATION/TRAINING PROGRAM

## 2021-12-02 PROCEDURE — 3008F BODY MASS INDEX DOCD: CPT | Performed by: STUDENT IN AN ORGANIZED HEALTH CARE EDUCATION/TRAINING PROGRAM

## 2021-12-02 PROCEDURE — 93000 ELECTROCARDIOGRAM COMPLETE: CPT | Performed by: STUDENT IN AN ORGANIZED HEALTH CARE EDUCATION/TRAINING PROGRAM

## 2021-12-02 PROCEDURE — 4004F PT TOBACCO SCREEN RCVD TLK: CPT | Performed by: STUDENT IN AN ORGANIZED HEALTH CARE EDUCATION/TRAINING PROGRAM

## 2021-12-02 PROCEDURE — 83036 HEMOGLOBIN GLYCOSYLATED A1C: CPT | Performed by: STUDENT IN AN ORGANIZED HEALTH CARE EDUCATION/TRAINING PROGRAM

## 2021-12-02 PROCEDURE — 82948 REAGENT STRIP/BLOOD GLUCOSE: CPT | Performed by: STUDENT IN AN ORGANIZED HEALTH CARE EDUCATION/TRAINING PROGRAM

## 2021-12-02 PROCEDURE — 99214 OFFICE O/P EST MOD 30 MIN: CPT | Performed by: STUDENT IN AN ORGANIZED HEALTH CARE EDUCATION/TRAINING PROGRAM

## 2021-12-02 RX ORDER — FAMOTIDINE 20 MG/1
20 TABLET, FILM COATED ORAL 2 TIMES DAILY
Qty: 90 TABLET | Refills: 0 | Status: SHIPPED | OUTPATIENT
Start: 2021-12-02 | End: 2022-06-07

## 2021-12-13 ENCOUNTER — EVALUATION (OUTPATIENT)
Dept: OCCUPATIONAL THERAPY | Facility: CLINIC | Age: 46
End: 2021-12-13
Payer: COMMERCIAL

## 2021-12-13 DIAGNOSIS — M25.531 RIGHT WRIST PAIN: ICD-10-CM

## 2021-12-13 DIAGNOSIS — M25.532 LEFT WRIST PAIN: Primary | ICD-10-CM

## 2021-12-13 PROCEDURE — 97166 OT EVAL MOD COMPLEX 45 MIN: CPT

## 2021-12-17 ENCOUNTER — OFFICE VISIT (OUTPATIENT)
Dept: OCCUPATIONAL THERAPY | Facility: CLINIC | Age: 46
End: 2021-12-17
Payer: COMMERCIAL

## 2021-12-17 DIAGNOSIS — M25.531 RIGHT WRIST PAIN: ICD-10-CM

## 2021-12-17 DIAGNOSIS — M25.532 LEFT WRIST PAIN: Primary | ICD-10-CM

## 2021-12-17 PROCEDURE — 97110 THERAPEUTIC EXERCISES: CPT

## 2021-12-22 ENCOUNTER — OFFICE VISIT (OUTPATIENT)
Dept: OCCUPATIONAL THERAPY | Facility: CLINIC | Age: 46
End: 2021-12-22
Payer: COMMERCIAL

## 2021-12-22 DIAGNOSIS — M25.532 LEFT WRIST PAIN: ICD-10-CM

## 2021-12-22 DIAGNOSIS — M25.531 RIGHT WRIST PAIN: Primary | ICD-10-CM

## 2021-12-22 PROCEDURE — 97110 THERAPEUTIC EXERCISES: CPT

## 2021-12-27 ENCOUNTER — APPOINTMENT (OUTPATIENT)
Dept: OCCUPATIONAL THERAPY | Facility: CLINIC | Age: 46
End: 2021-12-27
Payer: COMMERCIAL

## 2022-01-19 LAB
ALBUMIN SERPL-MCNC: 4.6 G/DL (ref 4–5)
ALBUMIN/GLOB SERPL: 1.5 {RATIO} (ref 1.2–2.2)
ALP SERPL-CCNC: 56 IU/L (ref 44–121)
ALT SERPL-CCNC: 93 IU/L (ref 0–44)
AST SERPL-CCNC: 45 IU/L (ref 0–40)
BASOPHILS # BLD AUTO: 0 X10E3/UL (ref 0–0.2)
BASOPHILS NFR BLD AUTO: 1 %
BILIRUB SERPL-MCNC: 0.3 MG/DL (ref 0–1.2)
BUN SERPL-MCNC: 15 MG/DL (ref 6–24)
BUN/CREAT SERPL: 16 (ref 9–20)
CALCIUM SERPL-MCNC: 10 MG/DL (ref 8.7–10.2)
CHLORIDE SERPL-SCNC: 103 MMOL/L (ref 96–106)
CHOLEST SERPL-MCNC: 222 MG/DL (ref 100–199)
CHOLEST/HDLC SERPL: 4.2 RATIO (ref 0–5)
CO2 SERPL-SCNC: 23 MMOL/L (ref 20–29)
CREAT SERPL-MCNC: 0.94 MG/DL (ref 0.76–1.27)
EOSINOPHIL # BLD AUTO: 0 X10E3/UL (ref 0–0.4)
EOSINOPHIL NFR BLD AUTO: 1 %
ERYTHROCYTE [DISTWIDTH] IN BLOOD BY AUTOMATED COUNT: 14.6 % (ref 11.6–15.4)
EST. AVERAGE GLUCOSE BLD GHB EST-MCNC: 126 MG/DL
GLOBULIN SER-MCNC: 3.1 G/DL (ref 1.5–4.5)
GLUCOSE SERPL-MCNC: 97 MG/DL (ref 65–99)
HBA1C MFR BLD: 6 % (ref 4.8–5.6)
HCT VFR BLD AUTO: 40.2 % (ref 37.5–51)
HCV AB S/CO SERPL IA: <0.1 S/CO RATIO (ref 0–0.9)
HDLC SERPL-MCNC: 53 MG/DL
HGB BLD-MCNC: 12.6 G/DL (ref 13–17.7)
HIV 1+2 AB+HIV1 P24 AG SERPL QL IA: NON REACTIVE
IMM GRANULOCYTES # BLD: 0 X10E3/UL (ref 0–0.1)
IMM GRANULOCYTES NFR BLD: 0 %
LDLC SERPL CALC-MCNC: 133 MG/DL (ref 0–99)
LYMPHOCYTES # BLD AUTO: 1.3 X10E3/UL (ref 0.7–3.1)
LYMPHOCYTES NFR BLD AUTO: 32 %
MCH RBC QN AUTO: 23.4 PG (ref 26.6–33)
MCHC RBC AUTO-ENTMCNC: 31.3 G/DL (ref 31.5–35.7)
MCV RBC AUTO: 75 FL (ref 79–97)
MONOCYTES # BLD AUTO: 0.3 X10E3/UL (ref 0.1–0.9)
MONOCYTES NFR BLD AUTO: 8 %
NEUTROPHILS # BLD AUTO: 2.2 X10E3/UL (ref 1.4–7)
NEUTROPHILS NFR BLD AUTO: 58 %
PLATELET # BLD AUTO: 282 X10E3/UL (ref 150–450)
POTASSIUM SERPL-SCNC: 4.4 MMOL/L (ref 3.5–5.2)
PROT SERPL-MCNC: 7.7 G/DL (ref 6–8.5)
RBC # BLD AUTO: 5.38 X10E6/UL (ref 4.14–5.8)
SL AMB EGFR AFRICAN AMERICAN: 113 ML/MIN/1.73
SL AMB EGFR NON AFRICAN AMERICAN: 98 ML/MIN/1.73
SL AMB VLDL CHOLESTEROL CALC: 36 MG/DL (ref 5–40)
SODIUM SERPL-SCNC: 141 MMOL/L (ref 134–144)
TRIGL SERPL-MCNC: 204 MG/DL (ref 0–149)
WBC # BLD AUTO: 3.9 X10E3/UL (ref 3.4–10.8)

## 2022-06-07 ENCOUNTER — HOSPITAL ENCOUNTER (EMERGENCY)
Facility: HOSPITAL | Age: 47
Discharge: HOME | End: 2022-06-07
Attending: EMERGENCY MEDICINE
Payer: COMMERCIAL

## 2022-06-07 VITALS
DIASTOLIC BLOOD PRESSURE: 79 MMHG | BODY MASS INDEX: 27.56 KG/M2 | OXYGEN SATURATION: 99 % | WEIGHT: 178.6 LBS | HEART RATE: 62 BPM | RESPIRATION RATE: 18 BRPM | SYSTOLIC BLOOD PRESSURE: 137 MMHG | TEMPERATURE: 97 F

## 2022-06-07 DIAGNOSIS — M54.50 LOW BACK PAIN: Primary | ICD-10-CM

## 2022-06-07 PROCEDURE — 99283 EMERGENCY DEPT VISIT LOW MDM: CPT

## 2022-06-07 PROCEDURE — 96372 THER/PROPH/DIAG INJ SC/IM: CPT

## 2022-06-07 PROCEDURE — 99284 EMERGENCY DEPT VISIT MOD MDM: CPT | Performed by: EMERGENCY MEDICINE

## 2022-06-07 RX ORDER — KETOROLAC TROMETHAMINE 30 MG/ML
15 INJECTION, SOLUTION INTRAMUSCULAR; INTRAVENOUS ONCE
Status: COMPLETED | OUTPATIENT
Start: 2022-06-07 | End: 2022-06-07

## 2022-06-07 RX ORDER — NAPROXEN 500 MG/1
500 TABLET ORAL 2 TIMES DAILY WITH MEALS
Qty: 30 TABLET | Refills: 0 | Status: SHIPPED | OUTPATIENT
Start: 2022-06-07

## 2022-06-07 RX ORDER — LIDOCAINE 50 MG/G
1 PATCH TOPICAL DAILY
Qty: 15 PATCH | Refills: 0 | Status: SHIPPED | OUTPATIENT
Start: 2022-06-07

## 2022-06-07 RX ORDER — LIDOCAINE 50 MG/G
1 PATCH TOPICAL ONCE
Status: DISCONTINUED | OUTPATIENT
Start: 2022-06-07 | End: 2022-06-07 | Stop reason: HOSPADM

## 2022-06-07 RX ADMIN — KETOROLAC TROMETHAMINE 15 MG: 30 INJECTION, SOLUTION INTRAMUSCULAR at 19:20

## 2022-06-07 RX ADMIN — LIDOCAINE 5% 1 PATCH: 700 PATCH TOPICAL at 19:19

## 2022-06-07 NOTE — DISCHARGE INSTRUCTIONS
You were evaluated for you back pain in the emergency department and determined appropriate for discharge  Please return to the emergency department immediately if you develop any significant worsening of pain, fevers, chills, urinary or bowel incontinence, urinary retention, numbness in the groin area, significant leg weakness or numbness, or are unable to walk  Continue symptomatic relief at home  Please follow up with your primary care provider in the next 5-7 days for further evaluation

## 2022-06-07 NOTE — ED PROVIDER NOTES
History  Chief Complaint   Patient presents with    Back Pain     States started 3 weeks ago with low back pain, tried icy hot and patches  HPI  Patient is a 60-year-old male history of GERD presenting for subacute lower back pain  Patient states that for about the past 3 weeks she has had constant bilateral lumbar pain radiating into the buttocks  Patient states that he feels tightness in the area  Patient states that he feels that both at rest and with activity  Patient denies any recent change in activity, denies trauma, fatigue, weight loss, fevers, chills, constitutional symptoms  Patient denies urinary or bowel incontinence, urinary retention, saddle anesthesia, leg weakness or numbness  Patient has been using icy hot patches at home without significant relief  Patient works repairing and moving refrigerators  None       Past Medical History:   Diagnosis Date    Anemia     GERD (gastroesophageal reflux disease)        Past Surgical History:   Procedure Laterality Date    DENTAL SURGERY      NO PAST SURGERIES      WISDOM TOOTH EXTRACTION         Family History   Problem Relation Age of Onset    Diabetes Mother     Diabetes Father     Cancer Father     Diabetes Paternal Aunt      I have reviewed and agree with the history as documented  E-Cigarette/Vaping    E-Cigarette Use Current Every Day User      E-Cigarette/Vaping Substances    Nicotine Yes     THC No     CBD Yes     Flavoring No     Other No     Unknown No      Social History     Tobacco Use    Smoking status: Current Every Day Smoker     Types: E-Cigarettes    Smokeless tobacco: Never Used    Tobacco comment: vapes, used to smoke cigarretes 0 25 ppd for 15-20 years   Vaping Use    Vaping Use: Every day    Substances: Nicotine, CBD   Substance Use Topics    Alcohol use:  Yes     Alcohol/week: 7 0 standard drinks     Types: 7 Standard drinks or equivalent per week    Drug use: Not Currently     Types: Marijuana Review of Systems   Constitutional: Negative for chills, fatigue and fever  HENT: Negative for congestion, rhinorrhea and sore throat  Eyes: Negative for photophobia and visual disturbance  Respiratory: Negative for chest tightness and shortness of breath  Cardiovascular: Negative for chest pain, palpitations and leg swelling  Gastrointestinal: Negative for abdominal distention, abdominal pain, diarrhea, nausea and vomiting  Endocrine: Negative for polydipsia and polyuria  Genitourinary: Negative for dysuria and hematuria  Musculoskeletal: Positive for back pain  Negative for arthralgias, gait problem, joint swelling, myalgias, neck pain and neck stiffness  Skin: Negative for color change, pallor, rash and wound  Neurological: Negative for weakness, numbness and headaches  Psychiatric/Behavioral: Negative for confusion  Physical Exam  Physical Exam  Vitals and nursing note reviewed  Constitutional:       General: He is not in acute distress  Appearance: He is well-developed  He is not diaphoretic  HENT:      Head: Normocephalic and atraumatic  Right Ear: External ear normal       Left Ear: External ear normal       Nose: Nose normal       Mouth/Throat:      Pharynx: No oropharyngeal exudate  Eyes:      Conjunctiva/sclera: Conjunctivae normal       Pupils: Pupils are equal, round, and reactive to light  Cardiovascular:      Rate and Rhythm: Normal rate and regular rhythm  Heart sounds: Normal heart sounds  No murmur heard  No friction rub  No gallop  Pulmonary:      Effort: Pulmonary effort is normal  No respiratory distress  Breath sounds: Normal breath sounds  No wheezing  Chest:      Chest wall: No tenderness  Abdominal:      General: Bowel sounds are normal  There is no distension  Palpations: Abdomen is soft  There is no mass  Tenderness: There is no abdominal tenderness  There is no guarding or rebound     Musculoskeletal: General: No deformity  Comments: Midline lumbosacral tenderness without overlying skin changes, step-off, deformity  Full strength and sensation bilaterally in lower extremities  Patient ambulatory in emergency department  Skin:     General: Skin is warm and dry  Capillary Refill: Capillary refill takes less than 2 seconds  Neurological:      Mental Status: He is alert and oriented to person, place, and time  Psychiatric:         Behavior: Behavior normal          Vital Signs  ED Triage Vitals [06/07/22 1903]   Temperature Pulse Respirations Blood Pressure SpO2   (!) 97 °F (36 1 °C) 62 18 137/79 99 %      Temp Source Heart Rate Source Patient Position - Orthostatic VS BP Location FiO2 (%)   Tympanic Monitor Sitting Left arm --      Pain Score       9           Vitals:    06/07/22 1903   BP: 137/79   Pulse: 62   Patient Position - Orthostatic VS: Sitting         Visual Acuity      ED Medications  Medications   lidocaine (LIDODERM) 5 % patch 1 patch (1 patch Topical Medication Applied 6/7/22 1919)   ketorolac (TORADOL) injection 15 mg (15 mg Intramuscular Given 6/7/22 1920)       Diagnostic Studies  Results Reviewed     None                 No orders to display              Procedures  Procedures         ED Course                               SBIRT 22yo+    Flowsheet Row Most Recent Value   SBIRT (25 yo +)    In order to provide better care to our patients, we are screening all of our patients for alcohol and drug use  Would it be okay to ask you these screening questions? No Filed at: 06/07/2022 1912                    MDM  Number of Diagnoses or Management Options  Low back pain  Diagnosis management comments: Acute lower back pain without red flag symptoms  Well-appearing with normal vital signs, benign examination  Treated symptomatically with Toradol, Lidoderm patch  Provided ambulatory referral to [facility]    Patient discharged with verbal and written return precautions  Disposition  Final diagnoses:   Low back pain     Time reflects when diagnosis was documented in both MDM as applicable and the Disposition within this note     Time User Action Codes Description Comment    6/7/2022  7:31 PM Angel Tr Whiting [M54 51] Low back pain       ED Disposition     ED Disposition   Discharge    Condition   Stable    Date/Time   Tue Jun 7, 2022  7:31 PM    Comment   Brianda [place] discharge to home/self care                 Follow-up Information     Follow up With Specialties Details Why Contact Info Additional Information    Physical Therapy at [facility]   [address] [place] [address]  [phone] [facility], [address] [place] [address], [address] [place], [place] [address]          Patient's Medications   Discharge Prescriptions    LIDOCAINE (LIDODERM) 5 %    Apply 1 patch topically daily Remove & Discard patch within 12 hours or as directed by MD       Start Date: 6/7/2022  End Date: --       Order Dose: 1 patch       Quantity: 15 patch    Refills: 0    NAPROXEN (NAPROSYN) 500 MG TABLET    Take 1 tablet (500 mg total) by mouth 2 (two) times a day with meals       Start Date: 6/7/2022  End Date: --       Order Dose: 500 mg       Quantity: 30 tablet    Refills: 0           PDMP Review     None          ED Provider  Electronically Signed by           Gordon Vuong MD  06/07/22 7530

## 2022-06-08 ENCOUNTER — NURSE TRIAGE (OUTPATIENT)
Dept: PHYSICAL THERAPY | Facility: OTHER | Age: 47
End: 2022-06-08

## 2022-06-08 NOTE — TELEPHONE ENCOUNTER
Call placed to the patient per [facility] referral     Patient agreed to triage and then said he was at work and could we call tomorrow 6/9/22 in the morning       Referral will be deferred and we will call back

## 2022-06-20 ENCOUNTER — CONSULT (OUTPATIENT)
Dept: CARDIOLOGY CLINIC | Facility: CLINIC | Age: 47
End: 2022-06-20
Payer: COMMERCIAL

## 2022-06-20 VITALS
HEIGHT: 68 IN | WEIGHT: 179 LBS | SYSTOLIC BLOOD PRESSURE: 130 MMHG | HEART RATE: 57 BPM | BODY MASS INDEX: 27.13 KG/M2 | OXYGEN SATURATION: 99 % | DIASTOLIC BLOOD PRESSURE: 98 MMHG | TEMPERATURE: 97.6 F

## 2022-06-20 DIAGNOSIS — I51.7 LVH (LEFT VENTRICULAR HYPERTROPHY): ICD-10-CM

## 2022-06-20 DIAGNOSIS — R07.9 CHEST PAIN, UNSPECIFIED TYPE: Primary | ICD-10-CM

## 2022-06-20 DIAGNOSIS — R00.1 BRADYCARDIA: ICD-10-CM

## 2022-06-20 PROCEDURE — 3008F BODY MASS INDEX DOCD: CPT | Performed by: INTERNAL MEDICINE

## 2022-06-20 PROCEDURE — 99204 OFFICE O/P NEW MOD 45 MIN: CPT | Performed by: INTERNAL MEDICINE

## 2022-06-20 PROCEDURE — 93000 ELECTROCARDIOGRAM COMPLETE: CPT | Performed by: INTERNAL MEDICINE

## 2022-06-20 PROCEDURE — 4004F PT TOBACCO SCREEN RCVD TLK: CPT | Performed by: INTERNAL MEDICINE

## 2022-06-20 NOTE — PROGRESS NOTES
Consultation - Cardiology Office  [facility]  Brianda Roth 56 y o  male MRN: [id number]  : 1975  Unit/Bed#:  Encounter: [id number]      ASSESSMENT:  Chest discomfort/pain x few months  Occurs both in rest and increases with exertion    LVH and bradycardia on EKG in PCPs office    GERD    Hyperlipidemia:  2022:  , [id number], HDL 53, elevated AST 45, and ALT 93  Managed by PCP    History of nicotine abuse/vaping    Daily alcohol consumption, half pint daily      RECOMMENDATIONS:  Echo ordered by primary service on 2021  Exercise stress test  Strongly advised to stop alcohol consumption in view of the abnormal liver enzymes and vaping  Consider follow-up LFTs and liver enzymes in treatment for hyperlipidemia when liver enzymes have improved    Thank you for your consultation  If you have any question please call me at [phone]      Primary Care Physician Requesting Consult: Yovana Stafford MD      Reason for Consult / Principal Problem:  Chest pain        HPI :     Brianda Roth is a 56y o  year old male who was referred by primary care doctor for evaluation of left-sided chest pain that the patient has been having for a few months  It occurs both at rest including when he is lying down and increases when he does any physical exertion  He denies any unusual dyspnea or syncope  Denies any trauma  He has a history of vaping and daily alcohol consumption and has elevated liver enzymes  Strongly encouraged patient is to quit alcohol consumption and vaping      Review of Systems   Cardiovascular: Positive for chest pain  All other systems reviewed and are negative  Amos Guise       Historical Information   Past Medical History:   Diagnosis Date    Anemia     GERD (gastroesophageal reflux disease)      Past Surgical History:   Procedure Laterality Date    DENTAL SURGERY      NO PAST SURGERIES      WISDOM TOOTH EXTRACTION       Social History Substance and Sexual Activity   Alcohol Use Yes    Alcohol/week: 7 0 standard drinks    Types: 7 Standard drinks or equivalent per week     Social History     Substance and Sexual Activity   Drug Use Not Currently    Types: Marijuana     Social History     Tobacco Use   Smoking Status Current Every Day Smoker    Types: E-Cigarettes   Smokeless Tobacco Never Used   Tobacco Comment    vapes, used to smoke cigarretes 0 25 ppd for 15-20 years     Family History:   Family History   Problem Relation Age of Onset    Diabetes Mother     Diabetes Father     Cancer Father     Diabetes Paternal Aunt        Meds/Allergies     No Known Allergies    Current Outpatient Medications:     lidocaine (Lidoderm) 5 %, Apply 1 patch topically daily Remove & Discard patch within 12 hours or as directed by MD, Disp: 15 patch, Rfl: 0    naproxen (Naprosyn) 500 mg tablet, Take 1 tablet (500 mg total) by mouth 2 (two) times a day with meals, Disp: 30 tablet, Rfl: 0    Vitals: Blood pressure 130/98, pulse 57, temperature 97 6 °F (36 4 °C), height 5' 7 5" (1 715 m), weight 81 2 kg (179 lb), SpO2 99 %  ?  Body mass index is 27 62 kg/m²  Vitals:    06/20/22 0916   Weight: 81 2 kg (179 lb)     BP Readings from Last 3 Encounters:   06/20/22 130/98   06/07/22 137/79   12/02/21 116/80       Physical Exam  PHYSICAL EXAMINATION:  Neurologic:  Alert & oriented x 3, no new focal deficits, Not in any acute distress,  Constitutional:  Well developed, well nourished, non-toxic appearance   Eyes:  Pupil equal and reacting to light, conjunctiva normal, No JVP, No LNP   HENT:  Atraumatic, oropharynx moist, Neck- normal range of motion, no tenderness,  Neck supple   Respiratory:  Bilateral air entry, mostly clear to auscultation  Cardiovascular: S1-S2 regular with a I/VI systolic murmur   GI:  Soft, nondistended, normal bowel sounds, nontender, no hepatosplenomegaly appreciated  Musculoskeletal: no tenderness, no deformities     Skin:  Well hydrated, no rash   Lymphatic:  No lymphadenopathy noted   Extremities:  No edema and distal pulses are present    Diagnostic Studies Review Cardio:      EKG:  Sinus bradycardia, heart rate 57 per minute, voltage criteria for LVH    Cardiac testing:   No results found for this or any previous visit  Imaging:  Chest X-Ray:   No Chest XR results available for this patient  CT-scan of the chest:     No CTA results available for this patient  Lab Review   Lab Results   Component Value Date    WBC 3 9 01/18/2022    HGB 12 6 (L) 01/18/2022    HCT 40 2 01/18/2022    MCV 75 (L) 01/18/2022    RDW 14 6 01/18/2022     01/18/2022     BMP:  Lab Results   Component Value Date    SODIUM 141 01/18/2022    K 4 4 01/18/2022     01/18/2022    CO2 23 01/18/2022    BUN 15 01/18/2022    CREATININE 0 94 01/18/2022    GLUC 97 01/18/2022    CALCIUM 9 3 03/04/2018    EGFR 100 03/04/2018    MG 2 0 03/04/2018     LFT:  Lab Results   Component Value Date    AST 45 (H) 01/18/2022    ALT 93 (H) 01/18/2022    ALKPHOS 63 03/04/2018    TP 7 7 01/18/2022    ALB 4 6 01/18/2022      No results found for: [id number]  No components found for: [facility]  Lab Results   Component Value Date    HGBA1C 6 0 (H) 01/18/2022     Lipid Profile:   Lab Results   Component Value Date    CHOLESTEROL 222 (H) 01/18/2022    HDL 53 01/18/2022    LDLCALC 133 (H) 01/18/2022    TRIG 204 (H) 01/18/2022     Lab Results   Component Value Date    CHOLESTEROL 222 (H) 01/18/2022     No results found for: CKTOTAL, CKMB, CKMBINDEX, TROPONINI  No results found for: NTBNP   No results found for this or any previous visit (from the past 672 hour(s))  Dr Yoon Alvarez MD, [facility]      "This note has been constructed using a voice recognition system  Therefore there may be syntax, spelling, and/or grammatical errors   Please call if you have any questions  "

## 2022-07-11 ENCOUNTER — OFFICE VISIT (OUTPATIENT)
Dept: FAMILY MEDICINE CLINIC | Facility: CLINIC | Age: 47
End: 2022-07-11
Payer: COMMERCIAL

## 2022-07-11 ENCOUNTER — APPOINTMENT (OUTPATIENT)
Dept: LAB | Facility: HOSPITAL | Age: 47
End: 2022-07-11
Payer: COMMERCIAL

## 2022-07-11 VITALS
TEMPERATURE: 97.3 F | RESPIRATION RATE: 18 BRPM | HEART RATE: 74 BPM | DIASTOLIC BLOOD PRESSURE: 80 MMHG | OXYGEN SATURATION: 99 % | BODY MASS INDEX: 27.08 KG/M2 | WEIGHT: 178.7 LBS | HEIGHT: 68 IN | SYSTOLIC BLOOD PRESSURE: 124 MMHG

## 2022-07-11 DIAGNOSIS — Z13.1 SCREENING FOR DIABETES MELLITUS: ICD-10-CM

## 2022-07-11 DIAGNOSIS — D50.9 MICROCYTIC ANEMIA: ICD-10-CM

## 2022-07-11 DIAGNOSIS — E78.5 HYPERLIPIDEMIA, UNSPECIFIED HYPERLIPIDEMIA TYPE: Primary | ICD-10-CM

## 2022-07-11 LAB
ANION GAP SERPL CALCULATED.3IONS-SCNC: 8 MMOL/L (ref 4–13)
BASOPHILS # BLD AUTO: 0.04 THOUSANDS/ΜL (ref 0–0.1)
BASOPHILS NFR BLD AUTO: 1 % (ref 0–1)
BUN SERPL-MCNC: 15 MG/DL (ref 5–25)
CALCIUM SERPL-MCNC: 8.9 MG/DL (ref 8.3–10.1)
CHLORIDE SERPL-SCNC: 102 MMOL/L (ref 100–108)
CO2 SERPL-SCNC: 27 MMOL/L (ref 21–32)
CREAT SERPL-MCNC: 0.96 MG/DL (ref 0.6–1.3)
EOSINOPHIL # BLD AUTO: 0.08 THOUSAND/ΜL (ref 0–0.61)
EOSINOPHIL NFR BLD AUTO: 1 % (ref 0–6)
ERYTHROCYTE [DISTWIDTH] IN BLOOD BY AUTOMATED COUNT: 14.6 % (ref 11.6–15.1)
FERRITIN SERPL-MCNC: 373 NG/ML (ref 8–388)
GFR SERPL CREATININE-BSD FRML MDRD: 94 ML/MIN/1.73SQ M
GLUCOSE P FAST SERPL-MCNC: 97 MG/DL (ref 65–99)
HCT VFR BLD AUTO: 40.5 % (ref 36.5–49.3)
HGB BLD-MCNC: 12.5 G/DL (ref 12–17)
IMM GRANULOCYTES # BLD AUTO: 0.01 THOUSAND/UL (ref 0–0.2)
IMM GRANULOCYTES NFR BLD AUTO: 0 % (ref 0–2)
IRON SATN MFR SERPL: 27 % (ref 20–50)
IRON SERPL-MCNC: 97 UG/DL (ref 65–175)
LYMPHOCYTES # BLD AUTO: 1.64 THOUSANDS/ΜL (ref 0.6–4.47)
LYMPHOCYTES NFR BLD AUTO: 30 % (ref 14–44)
MCH RBC QN AUTO: 23.9 PG (ref 26.8–34.3)
MCHC RBC AUTO-ENTMCNC: 30.9 G/DL (ref 31.4–37.4)
MCV RBC AUTO: 77 FL (ref 82–98)
MONOCYTES # BLD AUTO: 0.35 THOUSAND/ΜL (ref 0.17–1.22)
MONOCYTES NFR BLD AUTO: 6 % (ref 4–12)
NEUTROPHILS # BLD AUTO: 3.43 THOUSANDS/ΜL (ref 1.85–7.62)
NEUTS SEG NFR BLD AUTO: 62 % (ref 43–75)
NRBC BLD AUTO-RTO: 0 /100 WBCS
PLATELET # BLD AUTO: 290 THOUSANDS/UL (ref 149–390)
PMV BLD AUTO: 11.3 FL (ref 8.9–12.7)
POTASSIUM SERPL-SCNC: 4 MMOL/L (ref 3.5–5.3)
RBC # BLD AUTO: 5.23 MILLION/UL (ref 3.88–5.62)
SODIUM SERPL-SCNC: 137 MMOL/L (ref 136–145)
TIBC SERPL-MCNC: 356 UG/DL (ref 250–450)
WBC # BLD AUTO: 5.55 THOUSAND/UL (ref 4.31–10.16)

## 2022-07-11 PROCEDURE — 83550 IRON BINDING TEST: CPT

## 2022-07-11 PROCEDURE — 80048 BASIC METABOLIC PNL TOTAL CA: CPT

## 2022-07-11 PROCEDURE — 83540 ASSAY OF IRON: CPT

## 2022-07-11 PROCEDURE — 85025 COMPLETE CBC W/AUTO DIFF WBC: CPT

## 2022-07-11 PROCEDURE — 82728 ASSAY OF FERRITIN: CPT

## 2022-07-11 PROCEDURE — 99213 OFFICE O/P EST LOW 20 MIN: CPT | Performed by: FAMILY MEDICINE

## 2022-07-11 RX ORDER — ATORVASTATIN CALCIUM 20 MG/1
20 TABLET, FILM COATED ORAL DAILY
Qty: 30 TABLET | Refills: 0 | Status: SHIPPED | OUTPATIENT
Start: 2022-07-11 | End: 2022-08-10

## 2022-07-11 NOTE — PATIENT INSTRUCTIONS
Please find attached my 'Basic Healthy eating' handout  This is meant as an succinct reference overview of  healthy eating, rather than to be used to change your eating pattern all at once  Many people find it most useful if they first skim it to get an overview, then refer to it from time to time (a lot of my patients hang it on their refrigerator with a magnet for this purpose), making a few small doable changes each week  One way to do this is to pick one main meal a week (e g  Thursday's dinner)  to try a new recipe out of one of the cookbooks listed at the end of the handout (such as Vegetarian cooking for everyone" by Anupam Rosado)  Save the recipes you enjoy and discard the one's you don't  After a few months you will have a collection of wonderful healthy meals you like! This can be an enjoyable way to gradually transform your eating to support your well being  This transformation towards healthy eating is ideally guided by a professional with expertise in whole foods nutrition  You can always bring in a several day log of what you are eating if you wish guidance  See you next visit  Regards, Dr Solis Del Toro      This way of eating, though near optimal for most persons, is best individualized to fit the needs of each persons situation  It is based on years of clinical experience and study of the nutritional and medical literature  If you find that this way of eating is far from how you are eating now (an experience shared by many persons!), dont be discouraged  Pace yourself as you learn to change your relationship to food, allowing at least several months to accomplish the necessary changes  You will be learning how to change your eating culture  Because none of us are perfect, and our life situations are not perfect, if you are like most persons your eating will not always be perfect  Thats ok - theres no need for guilt or forcing    Rather, if and when you are ready, start making small steps towards a healthier lifestyle, allowing yourself enough time to accomplish each small goal  Most people find this is best done with the supervision of a professional with expertise in clinical nutrition and a health   If you take small steps regularly, over time you will finally reach the summit of good eating! HEALTHY EATING:  Eat a variety of foods that you both enjoy and are healthy for you  Most foods should be from local sources so that they are fresh, minimally processed and safe  [place] is crucial especially for foods containing perishable oils such as whole grain flour products and highly unsaturated vegetable and fish oils  Food grown in third world countries may have unrestricted pesticide applications  Though not mandatory, ideally try to obtain Organic produce, or at least that grown using Integrated Pest Management (IPM) methods, which minimizes pesticide use  Give the selection, preparation and eating of food the value and care it deserves  Rediscover the joaquin of the simple act of preparing and eating wholesome food  Include children and family members in the preparation of food to build a healthy food culture  We all rely on Convenience foods occasionally when our schedule is hectic, but over reliance carries a heavy price not only in lack of balanced nutrients but in lack of the meaning of food  Dont eat on the run  Brown bag a lunch if there are no satisfactory choices where you work  Do NOT skip meals  Spread calories out more or less equally throughout all the meals of the day  Do NOT go on a diet  Diets do not work! Changing your awareness and relationship with food does  Be clear if you are eating [place] food or daily healthy food  [place] foods are a major pleasure in life, and are consistent with health if they are occasional (e g  once or twice a month)   A quota of a small serving of a sweet (e g  two small cookies or a small chocolate bar) a few times/week is ok, though  [place] food includes anything deep fried (e g  French fries, chips), pastries, sodas, sweets  Except during celebrations, eat enough to satisfy hunger but not more  While this may seem self-evident, few of us do  This requires taking at least 20 minutes to eat to allow time for the bodys satiety (fullness) feedback and listening to this feedback  Avoid processed, prepackaged and convenience foods in general - these tend to be of poor quality and deceptively flavored to mask their lack of nutrients  Fat, salt and sugar are strategically manipulated in processed food to maximize craving (potential for becoming habit forming &addiction)  Any food that has Partially hydrogenated vegetable oils (a source of unhealthy trans fatty acids that have been linked with many diseases) is usually of inferior quality and should be avoided  Develop the habit of reading the labels of anything you buy  Avoid fats that contain these such as margarines or commercial shortening  Added mono- or diglycerides should also be avoided, as they are usually composed of trans fatty acids  Sugar or salt in modest amounts are generally O K  However if sugar or salt (or one of their equivalents) is within the top few ingredients, generally avoid the food  They are both used frequently in processed foods to compensate for poor quality and create cravings  Sugar equivalents include sucrose, fructose, glucose, mono- and disaccharides, natural cane sweetener, cane juice, honey, molasses and high fructose corn syrup  Even so called Health food, such as some forms of Granola or granola bars, and various nutrition bars may have excess sugar & calories  Buzzwords such as Natural are often used to entice consumers to purchase poor quality food that is loaded with empty calories  Avoid Industrially produced red meat - (the meat one encounters in most supermarkets)  This usually contains excessive and poor quality fat  If you do eat meat, do so sparingly (1 - 3X/wk ) and use free-range beef (e g  Coraid brand), buffalo or other livestock instead  [employer] such as bela is excellent if you have access to it  If you must use industrially produced meats, at least choose Select grade cuts with loin in them (e g  sirloin) - the leanest variety and trim away visible fat  Even better is plant sources of protein:  Dark leafy greens have an excellent nutrient/calorie ratio and are an underutilized source of protein  Try to eat at least ½ - 1 cup a day  Soy products such as tofu/tempeh/soy milk  (fortified with calcium best), preferably from organically grown soybeans and traditionally made  Legumes such as beans and lentis (use Beano on these once cool enough to eat if gas is a problem)  Whole grains + legumes = high quality protein  These dont need to be eaten at the same meal, just within 24 hours of each other  Examples: [brown rice or whole grain tortillas/bread/pasta] + [beans or lentils or peas]  Wild cold-water fish from unpolluted [place] - this is an excellent source of omega 3 fatty acids (which many peoples have a relative insufficiency of)  Be sure is fresh - if it smells or tastes fishy, its not! Very high quality aquaculture fish also an option  Use the [employer]afood [employer] dino for guidance on choosing environmentally responsible fish  White meat of poultry without the skin, preferably free-range can be eaten up to 2 or 3 times per week  Eggs in moderation (up to 4 per week) are O K , preferably from free-range hens  Low fat (e g  skim or 1% milk or yogurt) dairy, preferably from cows raised on organic farms allowed to graze on grass  Use Lactaid© drops or pre-treated milk if you have trouble digesting it  Total amount of high protein foods per day is generally 6 to 7 exchanges per day for most people, and should be mainly from plant sources  (Remember, an exchange is counted as only 1 oz  of lean meat, so 3 exchanges = 3 oz  of meat = the size of a deck of cards = 1 small hamburger = 1/2 of a whole chicken breast = 1 fish fillet = 1 1/2 cup tofu or beans ) View animal products more as garnishes rather than the centerpiece of a meal     Be sure to get 2 to 3 exchanges of a high calcium food  Low fat dairy has the highest amount of calcium  Fish with small bones such as salmon & sardines are a good source  Vegetarian sources include dark leafy greens of the Brassica family (e g  kale, collards, bok saundra & broccoli), calcium-fortified soy products, corn tortillas processed with lime and dried beans, nuts and seeds (almonds, brazil nuts, sesame seeds)  Note: certain vegetables high in oxalic acid such as spinach actually impair calcium absorption  Teens, young adults and pregnant or lactating women need 3 exchanges per day  Include cultured foods in your diet  These contribute probiotics (friendly bacteria) to the gut, which appear essential for health  Examples: Live culture yoghurt, kefir, tempeh, miso, etc     Try to eat a minimum of 3 but better 4 to 7 exchanges of vegetables per day  (This is in addition to fruit!)  Choose vegetables that are different colors - this will help ensure you receive the range of nutrients they offer  Include at least 1 exchange of a dark leafy green vegetable each day  These include Kale, Collards, Chard, Spinach, and Broccoli  Fruit is best eaten whole (better than juice or juice drinks)  Minimum is 2, but 3 - 4 whole fruit exchanges/day are optimal for most people  Carbohydrate (starch) sources should be unrefined and complex with low to medium glycemic index, so they are absorbed gradually into your system and dont lead to insulin surges    Examples of these include:  Legumes such as beans & lentils (also double as protein source)  Whole grains such as brown rice (basmati rice is good), wheat or oat berries,   Whole grain sprouted or 100% stone ground flour products (avoid finely milled white or enriched or even whole wheat flour products in general, as these have a high glycemic index)  Starchy tubers - e g  yams & squash  Potatoes are less nutritious - eat less often (<3 X/week)  Starchy vegetables are best if eaten whole, rather than in their more processed forms such as commercial [place] fries or chips  Have 1 - 3 carbohydrate exchanges/meal  You can be more liberal with carbs if you exercise after eating them, or within 2 hours post-vigorous exercise  Vigorous exercisers who need to maintain or gain weight may need even more  'Healthy fats include: For any use involving heating, use Olive, Canola or Sesame oil (Extra virgin, expeller- expressed best for all oils)  Never overheatany oil (i e  until it smokes)! A small amount of butter, lard or coconut or palm oil can occasionally be used, but do not overuse these saturated fats  For unheated purposes such as salad dressings, use Polyunsaturated oils such as Sunflower, soy or nut oils  Keep in refrigerator and use within a few weeks to ensure freshness  Avoid cottonseed and peanut oil  Flaxseed oil or freshly ground flaxseeds are a source of the essential omega 3 fatty acid linolenic acid  Many persons have a relative insufficiency in omega 3s  Use in salad dressings or for any unheated use  Buy this very perishable oil in small, opaque bottles that are ideally nitrogen packed and stabilized with antioxidants such as Vitamin E  Try to use it up within a few weeks  Keep flaxseed oil in the refrigerator, not in a cabinet  Keep flaxseeds in a small container in the freezer door and get in the habit of freshly grinding them and sprinkling them on salads, cereals, etc   Fish oils as noted above  These also must be very fresh and protected from oxidation like all highly unsaturated oils    Spectrum spread or similar brands can be used as a fat spread instead of butter or margarine  This is found in health food stores  Nuts & seeds or butters made from these in moderation are a source of both protein and fats  Drink plenty of fluid -8-glasses/day- water or flavored r[place], or teas are better than fruit juice, juice drinks or sodas  Observe the following maximum limits per day:  Coffee or strong tea:  Two cups (8 oz  each - not large mug size!) total  Sweets:  1 serving (e g  slice of pie or cake or 2 small cookies)  Eat sweets slowly and mindfully so you can savor and really enjoy them  Do not eat sweets while distracted with something else such as watching TV, as this often leads to eating a much greater quantity  Do not deprive yourself of sweets/ deserts - doing so often triggers a reactive eating binge later  If you do drink, do so in moderation: Maximum of 2 drinks/day for men and 1 drink/day for women  Pregnant & lactating women should not drink any alcohol   1 drink=12 oz  can of beer, 4 oz  of wine or 1 oz  (1 shot) of distilled spirits  Generally avoid all sodas, including diet sodas (which fool the body's appetite control mechanisms into eating more and alter gut microbiome)  Likewise avoid commercial sweetened beverages such as ice tea, etc  that are often loaded with sugar or artificial sweeteners  An occasional sweetened beverage, e g  once a month, say at a party however is harmless  Avoid going to fast food/ cheap restaurants such as [employer], [employer], [employer], [employer], [employer], [employer], many [facility] fast food establishments, etc , since the food is generally low quality and is designed to create cravings that are habit forming  COOKING METHODS:  1   Vegetables:  Best is steaming or light sautéing  Microwave ok  Avoid boiling (loses a lot of the water-soluble vitamins)  Try to eat both raw and cooked veggies  Meats:  Boiling, baking or light sautéing ok    Generally avoid grilling, charbroiling, or deep fat frying  Marinating meats in a vinegar or lemon juice - based marinade and avoiding flare-ups or high temperatures will minimize cancer-causing substances being formed if you want to grill  STORAGE METHODS:  Fresh is always best, but freezing is next best   Be careful of excess sodium in canned foods  One EXCHANGE =  Carbohydrates: 1 slice of bread, or 3/4 cup dried prepared cereal or 1/2 cup cooked cereal, pasta or grain dish, or 1 small cooked potato or corn on the cob or 1 small fruit (fruit counts as both a carbohydrate and a fruit exchange)  Vegetables:   1/2 cup cooked or 1 cup raw (e g  salad)  Fruit:  1 medium apple or similar fruit, or 1/2-cup fruit juice  High protein foods: 1/2 cup of cooked beans, peas or lentils or tofu or 1 oz (~28 grams) of fish, poultry, red meats, cheese or 1 egg  Soy/ Dairy: 1-cup soy/ rice milk or dairy milk or yogurt or 1 oz  cheese  Sweets/ treats: 1/2-cup ice cream or 2 medium (2 - 3 diameter) cookies or small wedge of pie/ cake  While supplements have their place, they should always be a far second to eating real whole foods  Very few supplements have been shown in scientific studies to improve health  Some exceptions in certain situations include multivitamins, Vitamin D, Fish oil (omega 3 fatty acids), Calcium, Magnesium and Iron (especially in women)  Even these are better obtained naturally if possible  Relying on supplements is a form of reductionism - the opposite of holism  Seek competent medical advice before using supplements  Nutrients in foods work together, analogous to musicians in a symphony working together to [employer]  When you take a single nutrient, or even a collection of similar nutrients, especially if you monique dose, the result may be analogous to the oboe in the symphony playing 10 times louder - it does not necessarily help the music!   Even a mixture of supplements cannot possibly hope to simulate the vastly complex interactions inherent in whole foods  Avoid any health care practitioner or health advice resource that makes promoting or selling supplements (or drugs/ procedures for that matter) a centerpiece of their advice instead of prioritizing whole foods and a balanced lifestyle  FURTHER READING:  American Wholefoods Cuisine ([employer]) by Craig Krishnan  Excellent basic guide on how to create appetizing whole plant based meals  The [employer] ([address]) by Shalini Lynn, [address] [facility] vegetarian approach, but helpful for everyone who wants to learn how to prepare these foods in a delicious and healthy manner  The introduction has a nice primer on basic good nutrition that can be useful for even non-vegetarians  Picture Perfect Weight Loss  [employer]) & Picture Perfect Weight Loss Cookbook ([employer]) by Dr Judith Galloway  Excellent, scientifically sound Food Awareness program and accompanying cookbook useful for all, not just those who want to lose weight  Amazing Soy ([employer]) by Adrian Francis  If you want to include healthier and delicious soy based foods in your meals, this book will show you how  Vegetarian Cooking for Everyone by Kuaidi Dache  Publisher: [address], 0  An excellent guide to cooking vegetables in interesting and tasty ways that can be used by vegetarians and non-vegetarians alike  The Omnivores dilemma, In [employer] of Food & Food Rules by Ocasio Soup  [employer]  These books explain where our food comes from and the strengths, weaknesses and toxicities of American food culture  Eat to Live by MD Allyssa Lea  2003  Focuses on Nutrient/Calorie ratio as a key to successful weight loss  Useful to all to further understand a healthy diet  Mindless Eating by Richelle Chance, PhD  [facility], 2006   Explains how we all eat for reasons other than to satisfy hunger and what to do about it  Salt, sugar, fat: how the food giants hooked us by [facility]  [address] Coleman  Primer on processed food defense  [place] over [employer] by [employer]  Hundreds of plant based delicious recipes  Fast food - Good food [employer]ok  by Alonzo Narayanan  Quick but healthy meals for people on the run          GOALS TO WORK ON:  1  ____________________________________________________________________________________________________________________________  ____________________________________________________________________________________________________________________________  ____________________________________________________________________________________________________________________________    C 2003 Revised 2/2021 Mihaela Blanchard MD, FAB  Board certified, Integrative Medicine

## 2022-07-11 NOTE — PROGRESS NOTES
Assessment/Plan:     Diagnoses and all orders for this visit:    · Hyperlipidemia, unspecified hyperlipidemia type  Patient with ASCVD of 7 7  · Last lipid panel showed elevated cholesterol, triglycerides, LDL  · At this time will start on Lipitor 20 mg daily  · At follow-up visit will increased Lipitor 40 mg daily  · Lipid panel a repeated in 3-6 months  · Counseled patient extensively on importance of diet and exercise given that he is also prediabetic  · Advised patient to follow-up in 1 month to discuss diet and exercise  -     atorvastatin (LIPITOR) 20 mg tablet; Take 1 tablet (20 mg total) by mouth daily    Prediabetes  · Today patient presents with polyuria, polydipsia, polyphagia  · Last A1c was 6 0 in 01/2022  · This time will repeat A1c  · If A1cs increase, patient counseled on diet and exercise with goal weight loss of 12-15 lb  · Discussed with patient the importance of healthy diet especially Mediterranean diet     -     HEMOGLOBIN A1C W/ EAG ESTIMATION; Future  -     Cancel: HEMOGLOBIN A1C W/ EAG ESTIMATION          Subjective:      Patient ID: Glo Hardy is a 56 y o  male  HPI  25-year-old male presents for lab result review  Patient states in the last 6 months he has been doing fairly well however he does have polyuria, polydipsia, polyphagia  Denies any history of diabetes  Denies any dysuria  Patient states that his diet consists of fast food and breakfast is usually burger and fries daily  Also states that he eats a lot of steak and red meat  He does not exercise  Patient works for himself and states that he does not have time to exercise  In addition, patient complains of lower back pain which is worse in the morning because he feel stiff  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  He denies any numbness and tingling or radiation down his legs       Denies any fevers, chills, chest pain, shortness of breath, nausea, vomiting, abdominal pain     The following portions of the patient's history were reviewed and updated as appropriate: allergies, current medications, past family history, past medical history, past social history, past surgical history and problem list     Review of Systems   Constitutional: Negative for chills, fatigue and fever  HENT: Negative for congestion  Eyes: Negative for discharge, itching and visual disturbance  Respiratory: Negative for cough, shortness of breath and wheezing  Cardiovascular: Negative for chest pain  Gastrointestinal: Negative for abdominal pain, constipation, diarrhea, nausea and vomiting  Endocrine: Positive for polydipsia, polyphagia and polyuria  Genitourinary: Negative for decreased urine volume, difficulty urinating and dysuria  Musculoskeletal: Positive for back pain  Negative for gait problem  Neurological: Negative for dizziness, tremors, weakness, light-headedness, numbness and headaches  Psychiatric/Behavioral: Negative for agitation, behavioral problems, self-injury, sleep disturbance and suicidal ideas  The patient is not nervous/anxious  See HPI  Objective:      /80 (BP Location: Left arm, Patient Position: Sitting, Cuff Size: Adult)   Pulse 74   Temp (!) 97 3 °F (36 3 °C) (Tympanic)   Resp 18   Ht 5' 7 5" (1 715 m)   Wt 81 1 kg (178 lb 11 2 oz)   SpO2 99%   BMI 27 58 kg/m²          Physical Exam  Vitals and nursing note reviewed  Constitutional:       Appearance: He is obese  HENT:      Head: Normocephalic and atraumatic  Cardiovascular:      Rate and Rhythm: Normal rate and regular rhythm  Pulses: Normal pulses  Heart sounds: Normal heart sounds  Pulmonary:      Effort: Pulmonary effort is normal       Breath sounds: Normal breath sounds  Abdominal:      General: Bowel sounds are normal       Palpations: Abdomen is soft  Tenderness: There is no abdominal tenderness  There is no right CVA tenderness or left CVA tenderness  Musculoskeletal:         General: No swelling or tenderness  Neurological:      General: No focal deficit present  Mental Status: He is alert and oriented to person, place, and time     Psychiatric:         Mood and Affect: Mood normal          Behavior: Behavior normal

## 2022-10-14 NOTE — PRE-PROCEDURE INSTRUCTIONS
Pre-Surgery Instructions:   Medication Instructions    Benzocaine-Menthol (Chloraseptic Sore Throat) 6-10 MG lozenge Patient was instructed by Physician and understands   pantoprazole (PROTONIX) 40 mg tablet Patient was instructed by Physician and understands  Problem: Pressure Injury - Risk of  Goal: *Prevention of pressure injury  Description: Document Dejuan Scale and appropriate interventions in the flowsheet. Outcome: Progressing Towards Goal  Note: Pressure Injury Interventions:  Sensory Interventions: Assess changes in LOC, Float heels, Keep linens dry and wrinkle-free, Maintain/enhance activity level, Minimize linen layers, Monitor skin under medical devices, Use 30-degree side-lying position, Turn and reposition approx. every two hours (pillows and wedges if needed), Pad between skin to skin    Moisture Interventions: Absorbent underpads, Apply protective barrier, creams and emollients, Check for incontinence Q2 hours and as needed, Internal/External urinary devices, Limit adult briefs, Maintain skin hydration (lotion/cream), Moisture barrier    Activity Interventions: Assess need for specialty bed    Mobility Interventions: Assess need for specialty bed, Float heels, HOB 30 degrees or less, Pressure redistribution bed/mattress (bed type), Turn and reposition approx.  every two hours(pillow and wedges)    Nutrition Interventions: Document food/fluid/supplement intake, Offer support with meals,snacks and hydration    Friction and Shear Interventions: Apply protective barrier, creams and emollients, HOB 30 degrees or less, Minimize layers, Transferring/repositioning devices

## 2022-10-30 ENCOUNTER — HOSPITAL ENCOUNTER (EMERGENCY)
Facility: HOSPITAL | Age: 47
Discharge: HOME | End: 2022-10-30
Attending: EMERGENCY MEDICINE

## 2022-10-30 VITALS
RESPIRATION RATE: 16 BRPM | OXYGEN SATURATION: 100 % | DIASTOLIC BLOOD PRESSURE: 85 MMHG | HEART RATE: 52 BPM | TEMPERATURE: 97.6 F | SYSTOLIC BLOOD PRESSURE: 129 MMHG

## 2022-10-30 DIAGNOSIS — K13.79 MOUTH SORES: Primary | ICD-10-CM

## 2022-10-30 RX ORDER — VALACYCLOVIR HYDROCHLORIDE 500 MG/1
2000 TABLET, FILM COATED ORAL ONCE
Status: COMPLETED | OUTPATIENT
Start: 2022-10-30 | End: 2022-10-30

## 2022-10-30 RX ORDER — LIDOCAINE HYDROCHLORIDE 20 MG/ML
15 SOLUTION OROPHARYNGEAL ONCE
Status: COMPLETED | OUTPATIENT
Start: 2022-10-30 | End: 2022-10-30

## 2022-10-30 RX ORDER — MULTIVITAMIN
1 TABLET ORAL DAILY
COMMUNITY

## 2022-10-30 RX ORDER — LIDOCAINE HYDROCHLORIDE 20 MG/ML
10 SOLUTION OROPHARYNGEAL 3 TIMES DAILY PRN
Qty: 100 ML | Refills: 0 | Status: SHIPPED | OUTPATIENT
Start: 2022-10-30 | End: 2022-11-04

## 2022-10-30 RX ORDER — VALACYCLOVIR HYDROCHLORIDE 500 MG/1
1000 TABLET, FILM COATED ORAL EVERY 8 HOURS SCHEDULED
Status: DISCONTINUED | OUTPATIENT
Start: 2022-10-30 | End: 2022-10-30

## 2022-10-30 RX ORDER — VALACYCLOVIR HYDROCHLORIDE 1 G/1
2000 TABLET, FILM COATED ORAL 2 TIMES DAILY
Qty: 4 TABLET | Refills: 0 | Status: SHIPPED | OUTPATIENT
Start: 2022-10-30 | End: 2022-10-31

## 2022-10-30 RX ADMIN — LIDOCAINE HYDROCHLORIDE 15 ML: 20 SOLUTION ORAL; TOPICAL at 17:29

## 2022-10-30 RX ADMIN — VALACYCLOVIR HYDROCHLORIDE 2000 MG: 500 TABLET, FILM COATED ORAL at 17:28

## 2022-10-30 NOTE — ED PROVIDER NOTES
History  Chief Complaint   Patient presents with   • Oral Pain     Pain on roof of mouth for about a week, thought it was from eating spicy food but it is getting worse     Patient presents for evaluation of pain to the roof of his mouth for 1 week  Pain also on the right upper side cheek as well  Patient has had this on all before but never this long  Thought initially burned his mouth some hot food or something but has not gone away  History provided by:  Patient   used: No    Oral Pain  Associated symptoms: no abdominal pain, no chest pain, no cough, no ear pain, no fever, no rash, no shortness of breath, no sore throat and no vomiting        Prior to Admission Medications   Prescriptions Last Dose Informant Patient Reported? Taking?    Multiple Vitamin (multivitamin) tablet   Yes Yes   Sig: Take 1 tablet by mouth daily   Nutritional Supplements (VITAMIN D BOOSTER PO)   Yes Yes   Sig: Take by mouth in the morning   Omega-3 Fatty Acids (FISH OIL CONCENTRATE PO)   Yes Yes   Sig: Take by mouth in the morning   atorvastatin (LIPITOR) 20 mg tablet   No No   Sig: Take 1 tablet (20 mg total) by mouth daily   lidocaine (Lidoderm) 5 %   No No   Sig: Apply 1 patch topically daily Remove & Discard patch within 12 hours or as directed by MD   Patient not taking: Reported on 7/11/2022   naproxen (Naprosyn) 500 mg tablet   No No   Sig: Take 1 tablet (500 mg total) by mouth 2 (two) times a day with meals   Patient not taking: Reported on 7/11/2022      Facility-Administered Medications: None       Past Medical History:   Diagnosis Date   • Anemia    • GERD (gastroesophageal reflux disease)        Past Surgical History:   Procedure Laterality Date   • DENTAL SURGERY     • NO PAST SURGERIES     • WISDOM TOOTH EXTRACTION         Family History   Problem Relation Age of Onset   • Diabetes Mother    • Diabetes Father    • Cancer Father    • Diabetes Paternal Aunt      I have reviewed and agree with the history as documented  E-Cigarette/Vaping   • E-Cigarette Use Current Every Day User      E-Cigarette/Vaping Substances   • Nicotine Yes    • THC No    • CBD Yes    • Flavoring No    • Other No    • Unknown No      Social History     Tobacco Use   • Smoking status: Current Every Day Smoker     Types: E-Cigarettes   • Smokeless tobacco: Never Used   • Tobacco comment: vapes, used to smoke cigarretes 0 25 ppd for 15-20 years   Vaping Use   • Vaping Use: Every day   • Substances: Nicotine, CBD   Substance Use Topics   • Alcohol use: Yes     Alcohol/week: 7 0 standard drinks     Types: 7 Standard drinks or equivalent per week     Comment: varies  maybe five shooters a day, last drink couple days ago   • Drug use: Not Currently       Review of Systems   Constitutional: Negative for chills and fever  HENT: Positive for mouth sores  Negative for ear pain, sore throat and trouble swallowing  Eyes: Negative for pain and visual disturbance  Respiratory: Negative for cough and shortness of breath  Cardiovascular: Negative for chest pain and palpitations  Gastrointestinal: Negative for abdominal pain and vomiting  Genitourinary: Negative for dysuria and hematuria  Musculoskeletal: Negative for arthralgias and back pain  Skin: Negative for color change and rash  Neurological: Negative for seizures and syncope  All other systems reviewed and are negative  Physical Exam  Physical Exam  Vitals and nursing note reviewed  Constitutional:       General: He is not in acute distress  HENT:      Head: Atraumatic  Right Ear: External ear normal       Left Ear: External ear normal       Nose: Nose normal       Mouth/Throat:      Mouth: Mucous membranes are moist       Pharynx: No oropharyngeal exudate  Eyes:      General: No scleral icterus  Conjunctiva/sclera: Conjunctivae normal    Cardiovascular:      Rate and Rhythm: Normal rate and regular rhythm     Pulmonary:      Effort: Pulmonary effort is normal  No respiratory distress  Breath sounds: Normal breath sounds  Musculoskeletal:         General: No deformity  Normal range of motion  Cervical back: Normal range of motion  No tenderness  Lymphadenopathy:      Cervical: No cervical adenopathy  Skin:     Capillary Refill: Capillary refill takes less than 2 seconds  Findings: No rash  Neurological:      General: No focal deficit present  Mental Status: He is alert and oriented to person, place, and time  Vital Signs  ED Triage Vitals [10/30/22 1601]   Temperature Pulse Respirations Blood Pressure SpO2   97 6 °F (36 4 °C) (!) 52 16 129/85 100 %      Temp Source Heart Rate Source Patient Position - Orthostatic VS BP Location FiO2 (%)   Tympanic Radial Sitting Right arm --      Pain Score       10 - Worst Possible Pain           Vitals:    10/30/22 1601   BP: 129/85   Pulse: (!) 52   Patient Position - Orthostatic VS: Sitting         Visual Acuity      ED Medications  Medications   Lidocaine Viscous HCl (XYLOCAINE) 2 % mucosal solution 15 mL (15 mL Swish & Swallow Given 10/30/22 1729)   valACYclovir (VALTREX) tablet 2,000 mg (2,000 mg Oral Given 10/30/22 1728)       Diagnostic Studies  Results Reviewed     None                 No orders to display              Procedures  Procedures         ED Course                                             MDM  Number of Diagnoses or Management Options  Mouth sores  Diagnosis management comments: Pulse ox 100% on room air indicating adequate oxygenation  Likely herpes simplex type 1 infection  Will try dose of Valtrex and then topical lidocaine for comfort  Advised patient to this will resolve on its own although it may take another week         Amount and/or Complexity of Data Reviewed  Decide to obtain previous medical records or to obtain history from someone other than the patient: yes  Review and summarize past medical records: yes    Patient Progress  Patient progress: stable      Disposition  Final diagnoses:   Mouth sores     Time reflects when diagnosis was documented in both MDM as applicable and the Disposition within this note     Time User Action Codes Description Comment    10/30/2022  4:36 PM Devon Whiting [[id number]] Mouth sores       ED Disposition     ED Disposition   Discharge    Condition   Stable    Date/Time   Sun Oct 30, 2022  4:36 PM    Comment   Criselda Ahr discharge to home/self care                 Follow-up Information     Follow up With Specialties Details Why Contact Info    Kristin Falcon MD Otolaryngology In 1 week As needed [address] [place] [address]  [phone]            Discharge Medication List as of 10/30/2022  4:43 PM      START taking these medications    Details   Lidocaine Viscous HCl (XYLOCAINE) 2 % mucosal solution Swish and spit 10 mL 3 (three) times a day as needed for mouth pain or discomfort for up to 5 days, Starting Sun 10/30/2022, Until Fri 11/4/2022 at 2359, Normal      valACYclovir (VALTREX) 1,000 mg tablet Take 2 tablets (2,000 mg total) by mouth 2 (two) times a day for 1 day, Starting Sun 10/30/2022, Until Mon 10/31/2022, Normal         CONTINUE these medications which have NOT CHANGED    Details   Multiple Vitamin (multivitamin) tablet Take 1 tablet by mouth daily, Historical Med      Nutritional Supplements (VITAMIN D BOOSTER PO) Take by mouth in the morning, Historical Med      Omega-3 Fatty Acids (FISH OIL CONCENTRATE PO) Take by mouth in the morning, Historical Med      atorvastatin (LIPITOR) 20 mg tablet Take 1 tablet (20 mg total) by mouth daily, Starting Mon 7/11/2022, Until Wed 8/10/2022, Normal      lidocaine (Lidoderm) 5 % Apply 1 patch topically daily Remove & Discard patch within 12 hours or as directed by MD, Starting Tue 6/7/2022, Normal      naproxen (Naprosyn) 500 mg tablet Take 1 tablet (500 mg total) by mouth 2 (two) times a day with meals, Starting Tue 6/7/2022, Normal             No discharge procedures on file      PDMP Review     None          ED Provider  Electronically Signed by           Antonino Rushing, DO  10/30/22 Rosey 25, DO  10/30/22 2193

## 2023-04-12 PROBLEM — L91.0 KELOID SCAR OF SKIN: Status: ACTIVE | Noted: 2023-04-12

## 2023-05-01 ENCOUNTER — OFFICE VISIT (OUTPATIENT)
Dept: FAMILY MEDICINE CLINIC | Facility: CLINIC | Age: 48
End: 2023-05-01

## 2023-05-01 VITALS
HEART RATE: 71 BPM | HEIGHT: 68 IN | SYSTOLIC BLOOD PRESSURE: 122 MMHG | BODY MASS INDEX: 27.26 KG/M2 | OXYGEN SATURATION: 97 % | DIASTOLIC BLOOD PRESSURE: 74 MMHG | WEIGHT: 179.9 LBS | TEMPERATURE: 96.5 F

## 2023-05-01 DIAGNOSIS — Z78.9 ALCOHOL USE: ICD-10-CM

## 2023-05-01 DIAGNOSIS — K21.9 GASTROESOPHAGEAL REFLUX DISEASE WITHOUT ESOPHAGITIS: ICD-10-CM

## 2023-05-01 DIAGNOSIS — K64.9 HEMORRHOIDS, UNSPECIFIED HEMORRHOID TYPE: ICD-10-CM

## 2023-05-01 DIAGNOSIS — Z00.00 ANNUAL PHYSICAL EXAM: Primary | ICD-10-CM

## 2023-05-01 DIAGNOSIS — M54.50 ACUTE BILATERAL LOW BACK PAIN WITHOUT SCIATICA: ICD-10-CM

## 2023-05-01 DIAGNOSIS — R73.03 PREDIABETES: ICD-10-CM

## 2023-05-01 DIAGNOSIS — E78.5 HYPERLIPIDEMIA, UNSPECIFIED HYPERLIPIDEMIA TYPE: ICD-10-CM

## 2023-05-01 DIAGNOSIS — Z12.5 PROSTATE CANCER SCREENING: ICD-10-CM

## 2023-05-01 RX ORDER — ATORVASTATIN CALCIUM 20 MG/1
20 TABLET, FILM COATED ORAL DAILY
Qty: 30 TABLET | Refills: 0 | Status: SHIPPED | OUTPATIENT
Start: 2023-05-01 | End: 2023-05-31

## 2023-05-01 RX ORDER — FAMOTIDINE 20 MG/1
20 TABLET, FILM COATED ORAL DAILY
Qty: 30 TABLET | Refills: 0 | Status: SHIPPED | OUTPATIENT
Start: 2023-05-01

## 2023-05-01 NOTE — PATIENT INSTRUCTIONS
Wellness Visit for Adults   AMBULATORY CARE:   A wellness visit  is when you see your healthcare provider to get screened for health problems  Your healthcare provider will also give you advice on how to stay healthy  Write down your questions so you remember to ask them  Ask your healthcare provider how often you should have a wellness visit  What happens at a wellness visit:  Your healthcare provider will ask about your health, and your family history of health problems  This includes high blood pressure, heart disease, and cancer  He or she will ask if you have symptoms that concern you, if you smoke, and about your mood  You may also be asked about your intake of medicines, supplements, food, and alcohol  Any of the following may be done:  • Your weight  will be checked  Your height may also be checked so your body mass index (BMI) can be calculated  Your BMI shows if you are at a healthy weight  • Your blood pressure  and heart rate will be checked  Your temperature may also be checked  • Blood and urine tests  may be done  Blood tests may be done to check your cholesterol levels  Abnormal cholesterol levels increase your risk for heart disease and stroke  You may also need a blood or urine test to check for diabetes if you are at increased risk  Urine tests may be done to look for signs of an infection or kidney disease  • A physical exam  includes checking your heartbeat and lungs with a stethoscope  Your healthcare provider may also check your skin to look for sun damage  • Screening tests  may be recommended  A screening test is done to check for diseases that may not cause symptoms  The screening tests you may need depend on your age, gender, family history, and lifestyle habits  For example, colorectal screening may be recommended if you are 48years old or older  Screening tests you need if you are a woman:   • A Pap smear  is used to screen for cervical cancer   Pap smears are usually done every 3 to 5 years depending on your age  You may need them more often if you have had abnormal Pap smear test results in the past  Ask your healthcare provider how often you should have a Pap smear   A mammogram  is an x-ray of your breasts to screen for breast cancer  Experts recommend mammograms every 2 years starting at age 48 years  You may need a mammogram at age 52 years or younger if you have an increased risk for breast cancer  Talk to your healthcare provider about when you should start having mammograms and how often you need them  Vaccines you may need:    Get an influenza vaccine  every year  The influenza vaccine protects you from the flu  Several types of viruses cause the flu  The viruses change over time, so new vaccines are made each year   Get a tetanus-diphtheria (Td) booster vaccine  every 10 years  This vaccine protects you against tetanus and diphtheria  Tetanus is a severe infection that may cause painful muscle spasms and lockjaw  Diphtheria is a severe bacterial infection that causes a thick covering in the back of your mouth and throat   Get a human papillomavirus (HPV) vaccine  if you are female and aged 23 to 32 or male 23 to 24 and never received it  This vaccine protects you from HPV infection  HPV is the most common infection spread by sexual contact  HPV may also cause vaginal, penile, and anal cancers   Get a pneumococcal vaccine  if you are aged 72 years or older  The pneumococcal vaccine is an injection given to protect you from pneumococcal disease  Pneumococcal disease is an infection caused by pneumococcal bacteria  The infection may cause pneumonia, meningitis, or an ear infection   Get a shingles vaccine  if you are 61 or older, even if you have had shingles before  The shingles vaccine is an injection to protect you from the varicella-zoster virus  This is the same virus that causes chickenpox   Shingles is a painful rash that develops in people who had chickenpox or have been exposed to the virus  How to eat healthy:  My Plate is a model for planning healthy meals  It shows the types and amounts of foods that should go on your plate  Fruits and vegetables make up about half of your plate, and grains and protein make up the other half  A serving of dairy is included on the side of your plate  The amount of calories and serving sizes you need depends on your age, gender, weight, and height  Examples of healthy foods are listed below:   Eat a variety of vegetables  such as dark green, red, and orange vegetables  You can also include canned vegetables low in sodium (salt) and frozen vegetables without added butter or sauces   Eat a variety of fresh fruits , canned fruit in 100% juice, frozen fruit, and dried fruit   Include whole grains  At least half of the grains you eat should be whole grains  Examples include whole-wheat bread, wheat pasta, brown rice, and whole-grain cereals such as oatmeal      Eat a variety of protein foods such as seafood (fish and shellfish), lean meat, and poultry without skin (turkey and chicken)  Examples of lean meats include pork leg, shoulder, or tenderloin, and beef round, sirloin, tenderloin, and extra lean ground beef  Other protein foods include eggs and egg substitutes, beans, peas, soy products, nuts, and seeds   Choose low-fat dairy products such as skim or 1% milk or low-fat yogurt, cheese, and cottage cheese   Limit unhealthy fats  such as butter, hard margarine, and shortening  Exercise:  Exercise at least 30 minutes per day on most days of the week  Some examples of exercise include walking, biking, dancing, and swimming  You can also fit in more physical activity by taking the stairs instead of the elevator or parking farther away from stores  Include muscle strengthening activities 2 days each week  Regular exercise provides many health benefits   It helps you manage your weight, and decreases your risk for type 2 diabetes, heart disease, stroke, and high blood pressure  Exercise can also help improve your mood  Ask your healthcare provider about the best exercise plan for you  General health and safety guidelines:    Do not smoke  Nicotine and other chemicals in cigarettes and cigars can cause lung damage  Ask your healthcare provider for information if you currently smoke and need help to quit  E-cigarettes or smokeless tobacco still contain nicotine  Talk to your healthcare provider before you use these products   Limit alcohol  A drink of alcohol is 12 ounces of beer, 5 ounces of wine, or 1½ ounces of liquor   Lose weight, if needed  Being overweight increases your risk of certain health conditions  These include heart disease, high blood pressure, type 2 diabetes, and certain types of cancer   Protect your skin  Do not sunbathe or use tanning beds  Use sunscreen with a SPF 15 or higher  Apply sunscreen at least 15 minutes before you go outside  Reapply sunscreen every 2 hours  Wear protective clothing, hats, and sunglasses when you are outside   Drive safely  Always wear your seatbelt  Make sure everyone in your car wears a seatbelt  A seatbelt can save your life if you are in an accident  Do not use your cell phone when you are driving  This could distract you and cause an accident  Pull over if you need to make a call or send a text message   Practice safe sex  Use latex condoms if are sexually active and have more than one partner  Your healthcare provider may recommend screening tests for sexually transmitted infections (STIs)   Wear helmets, lifejackets, and protective gear  Always wear a helmet when you ride a bike or motorcycle, go skiing, or play sports that could cause a head injury  Wear protective equipment when you play sports  Wear a lifejacket when you are on a boat or doing water sports      © Copyright [employer] 2022 Information is for End User's use only and may not be sold, redistributed or otherwise used for commercial purposes  The above information is an  only  It is not intended as medical advice for individual conditions or treatments  Talk to your doctor, nurse or pharmacist before following any medical regimen to see if it is safe and effective for you

## 2023-05-01 NOTE — PROGRESS NOTES
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  · Stretching prior to lifting at work   · Physical therapy and exercise   · Topical Voltaren and NSAIDs prn for pain   · Apply ice/heat to help swelling/inflammation   -     Diclofenac Sodium (VOLTAREN) 1 %; Apply 2 g topically 4 (four) times a day To affected area  -     Ambulatory Referral to Physical Therapy; Future    3  Alcohol use  Currently drinks 1/2 pint of liquor daily   Patient wants to cut back   · Encouraged alcohol cessation and educated patient on available resources to help quit  Patient states he does not want to quit drinking alcohol completely but is amenable to cutting down  -     Comprehensive metabolic panel; Future  -     Comprehensive metabolic panel    4  Prediabetes  -     HEMOGLOBIN A1C W/ EAG ESTIMATION; Future  -     HEMOGLOBIN A1C W/ EAG ESTIMATION    5  Prostate cancer screening  -     PSA, total and free; Future  -     PSA, total and free    6  Hyperlipidemia, unspecified hyperlipidemia type  -     Lipid Panel with Direct LDL reflex; Future  -     atorvastatin (LIPITOR) 20 mg tablet; Take 1 tablet (20 mg total) by mouth daily  -     Lipid Panel with Direct LDL reflex    7  Gastroesophageal reflux disease without esophagitis  -     famotidine (PEPCID) 20 mg tablet; Take 1 tablet (20 mg total) by mouth daily    8  Hemorrhoids, unspecified hemorrhoid type  Painless bowel movements but noted intermittent blood when wiping, about once per month  Feels better with preparation H OTC  Likely internal hemorrhoids  Recent colonoscopy in  was normal, advised repeat colonoscopy in 10 years at that time  · Continue using preparation H prn   · Stool softeners, increase fiber in diet, and drink plenty of water to keep stool soft  Soft stools will help healing of internal hemorrhoids  · Follow up in 2 months if not improved  Immunizations and preventive care screenings were discussed with patient today  Appropriate education was printed on patient's after visit summary  Discussed risks and benefits of prostate cancer screening  We discussed the controversial history of PSA screening for prostate cancer in the [place] as well as the risk of over detection and over treatment of prostate cancer by way of PSA screening  The patient understands that PSA blood testing is an imperfect way to screen for prostate cancer and that elevated PSA levels in the blood may also be caused by infection, inflammation, prostatic trauma or manipulation, urological procedures, or by benign prostatic enlargement  The role of the digital rectal examination in prostate cancer screening was also discussed and I discussed with him that there is large interobserver variability in the findings of digital rectal examination  Counseling:  Alcohol/drug use: discussed moderation in alcohol intake, the recommendations for healthy alcohol use, and avoidance of illicit drug use  Dental Health: discussed importance of regular tooth brushing, flossing, and dental visits  Injury prevention: discussed safety/seat belts, safety helmets, smoke detectors, carbon dioxide detectors, and smoking near bedding or upholstery  Sexual health: discussed sexually transmitted diseases, partner selection, use of condoms, avoidance of unintended pregnancy, and contraceptive alternatives  · Exercise: the importance of regular exercise/physical activity was discussed  Recommend exercise 3-5 times per week for at least 30 minutes  BMI Counseling: Body mass index is 27 76 kg/m²   The BMI is above normal  Nutrition recommendations include decreasing portion sizes, encouraging healthy choices of fruits and vegetables, decreasing fast food intake, consuming healthier snacks, limiting drinks that contain sugar and moderation in carbohydrate intake  Exercise recommendations include moderate physical activity 150 minutes/week  Rationale for BMI follow-up plan is due to patient being overweight or obese  Depression Screening and Follow-up Plan: Patient was screened for depression during today's encounter  They screened negative with a PHQ-2 score of 1  Return in about 2 months (around 7/1/2023) for Follow Up  Chief Complaint:     Chief Complaint   Patient presents with    Physical Exam      History of Present Illness:     Adult Annual Physical   Patient here for a comprehensive physical exam  The patient reports concerns  He states he has back pain, groin pain, and pain on the side of his abdomen  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, and thinks the pain is related to that  He has not tried any medications or ice/heat  He states the groin and back pain also comes on after he drinks alcohol  He usually drinks about 1/2 pint of liquor daily  He also notes blood when he wipes after a bowel movement, per patient occurs about once per month for the past few years  He has used preparation H otc which helped  He denies pain with bowel movements  Diet and Physical Activity  · Diet/Nutrition: poor diet, frequent junk food and high fat diet  · Exercise: no formal exercise  Depression Screening  PHQ-2/9 Depression Screening    Little interest or pleasure in doing things: 1 - several days  Feeling down, depressed, or hopeless: 0 - not at all  PHQ-2 Score: 1  PHQ-2 Interpretation: Negative depression screen       General Health  · Sleep: sleeps well and gets 4-6 hours of sleep on average     · Hearing: normal - bilateral   · Vision: vision problems: blurry, most recent eye exam >1 year ago and wears glasses  · Dental: no dental visits for >1 year, brushes teeth twice daily and flosses teeth occasionally   Health  · Symptoms include: incomplete bladder emptying, post-void dribbling and weak urinary stream     Review of Systems:     Review of Systems   Constitutional: Negative for appetite change, chills, diaphoresis, fatigue and fever  HENT: Positive for sore throat  Negative for congestion, sinus pressure, sinus pain and sneezing  Eyes: Negative for pain, redness and itching  Respiratory: Positive for chest tightness  Negative for cough and shortness of breath  Cardiovascular: Positive for palpitations  Negative for chest pain  Gastrointestinal: Positive for blood in stool (intermittent, when wiping)  Negative for abdominal pain, constipation, diarrhea, nausea and vomiting  Endocrine: Positive for polydipsia and polyuria  Genitourinary: Positive for difficulty urinating  Negative for dysuria and flank pain  Musculoskeletal: Positive for arthralgias (right shoulder) and back pain  Negative for myalgias  Skin: Negative for color change, pallor and rash  Neurological: Negative for dizziness, light-headedness and headaches  All other systems reviewed and are negative       Past Medical History:     Past Medical History:   Diagnosis Date    Anemia     GERD (gastroesophageal reflux disease)       Past Surgical History:     Past Surgical History:   Procedure Laterality Date    DENTAL SURGERY      NO PAST SURGERIES      WISDOM TOOTH EXTRACTION        Family History:     Family History   Problem Relation Age of Onset    Diabetes Mother     Diabetes Father     Cancer Father     Diabetes Paternal Aunt       Social History:     Social History     Socioeconomic History    Marital status: /Civil Union     Spouse name: None    Number of children: None    Years of education: None    Highest education level: None   Occupational History    None   Tobacco Use    Smoking status: Every Day     Types: E-Cigarettes    Smokeless tobacco: Never    Tobacco comments:     vapes, used to smoke cigarretes 0 25 ppd for 15-20 years   Vaping Use    Vaping Use: Every day    Substances: Nicotine, CBD   Substance and Sexual Activity    Alcohol use: Yes     Alcohol/week: 7 0 standard drinks     Types: 7 Standard drinks or equivalent per week     Comment: varies   maybe five shooters a day, last drink couple days ago    Drug use: Not Currently    Sexual activity: Yes     Partners: Female     Birth control/protection: None     Comment: Wife   Other Topics Concern    None   Social History Narrative    None     Social Determinants of Health     Financial Resource Strain: Not on file   Food Insecurity: Not on file   Transportation Needs: Not on file   Physical Activity: Not on file   Stress: Not on file   Social Connections: Not on file   Intimate Partner Violence: Not on file   Housing Stability: Not on file      Current Medications:     Current Outpatient Medications   Medication Sig Dispense Refill    atorvastatin (LIPITOR) 20 mg tablet Take 1 tablet (20 mg total) by mouth daily 30 tablet 0    Diclofenac Sodium (VOLTAREN) 1 % Apply 2 g topically 4 (four) times a day To affected area 100 g 1    famotidine (PEPCID) 20 mg tablet Take 1 tablet (20 mg total) by mouth daily 30 tablet 0    Multiple Vitamin (multivitamin) tablet Take 1 tablet by mouth daily      Nutritional Supplements (VITAMIN D BOOSTER PO) Take by mouth in the morning      Biotin 1 MG CAPS Take by mouth (Patient not taking: Reported on 5/1/2023)      lidocaine (Lidoderm) 5 % Apply 1 patch topically daily Remove & Discard patch within 12 hours or as directed by MD (Patient not taking: Reported on 7/11/2022) 15 patch 0    naproxen (Naprosyn) 500 mg tablet Take 1 tablet (500 mg total) by mouth 2 (two) times a day with meals (Patient not taking: Reported on 7/11/2022) 30 tablet 0    Omega-3 Fatty Acids (FISH OIL CONCENTRATE PO) "Take by mouth in the morning (Patient not taking: Reported on 4/12/2023)      valACYclovir (VALTREX) 1,000 mg tablet Take 2 tablets (2,000 mg total) by mouth 2 (two) times a day for 1 day 4 tablet 0     No current facility-administered medications for this visit  Allergies:     No Known Allergies   Physical Exam:     /74 (BP Location: Left arm, Patient Position: Sitting, Cuff Size: Large)   Pulse 71   Temp (!) 96 5 °F (35 8 °C) (Tympanic)   Ht 5' 7 5\" (1 715 m)   Wt 81 6 kg (179 lb 14 4 oz)   SpO2 97%   BMI 27 76 kg/m²     Physical Exam  Vitals and nursing note reviewed  Constitutional:       General: He is not in acute distress  Appearance: He is well-developed and overweight  HENT:      Head: Normocephalic and atraumatic  Right Ear: Tympanic membrane, ear canal and external ear normal       Left Ear: Tympanic membrane, ear canal and external ear normal    Eyes:      Extraocular Movements: Extraocular movements intact  Conjunctiva/sclera: Conjunctivae normal       Pupils: Pupils are equal, round, and reactive to light  Cardiovascular:      Rate and Rhythm: Normal rate and regular rhythm  Heart sounds: Normal heart sounds  No murmur heard  Pulmonary:      Effort: Pulmonary effort is normal  No respiratory distress  Breath sounds: Normal breath sounds  Abdominal:      General: Abdomen is flat  Bowel sounds are normal       Palpations: Abdomen is soft  Tenderness: There is no abdominal tenderness  Musculoskeletal:      Cervical back: Neck supple  Right lower leg: No edema  Left lower leg: No edema  Skin:     General: Skin is warm  Neurological:      Mental Status: He is alert and oriented to person, place, and time  Cranial Nerves: No cranial nerve deficit     Psychiatric:         Mood and Affect: Mood normal          Behavior: Behavior normal           Twila Thibodeaux DO  [facility]  "

## 2023-05-15 ENCOUNTER — EVALUATION (OUTPATIENT)
Dept: PHYSICAL THERAPY | Facility: CLINIC | Age: 48
End: 2023-05-15

## 2023-05-15 DIAGNOSIS — M54.50 ACUTE BILATERAL LOW BACK PAIN WITHOUT SCIATICA: Primary | ICD-10-CM

## 2023-05-15 NOTE — PROGRESS NOTES
PT Evaluation     Today's date: 5/15/2023  Patient name: Cyndi Wu  : 1975  MRN: [id number]  Referring provider: Jose Campo DO  Dx:   Encounter Diagnosis     ICD-10-CM    1  Acute bilateral low back pain without sciatica  M54 50 Ambulatory Referral to Physical Therapy          Start Time: 1755  Stop Time: 1825  Total time in clinic (min): 30 minutes    Assessment  Assessment details: Cyndi Wu is a 53 y o  male who presents with complaints of Acute bilateral low back pain without sciatica  (primary encounter diagnosis)  No further referral appears necessary at this time based upon examination results  Patient is presenting with extension directional preference as movement in this direction improved back symptoms  Postural correction also improve symptoms  Patient educated on HEP and role of postural support when sitting  Currently, patient is limited with standing, sitting, performing transfers, sleeping, and working  Prognosis is good given HEP compliance and PT 1-2x/wk  Positive prognostic indicators include positive attitude toward recovery  Please contact me if you have any questions or recommendations  Thank you for the opportunity to share in José Miguel's care  Impairments: abnormal muscle firing, abnormal or restricted ROM, abnormal movement, impaired physical strength, lacks appropriate home exercise program, pain with function and poor posture     Symptom irritability: moderateUnderstanding of Dx/Px/POC: good   Prognosis: good    Plan  Patient would benefit from: skilled physical therapy  Planned therapy interventions: joint mobilization, manual therapy, patient education, postural training, strengthening, stretching, therapeutic activities, therapeutic exercise, home exercise program, neuromuscular re-education, flexibility, functional ROM exercises and abdominal trunk stabilization  Frequency: 1-2x    Duration in weeks: 8  Treatment plan discussed with: patient        Subjective Evaluation    History of Present Illness  Mechanism of injury: Patient reports chronic pain worse when he wakes in the morning and having a hard time bending forward to put booties on his feet  Does wear lumbar support to try helping with his discomfort  Does have discomfort with transitions and the longer he has been sitting the worse the transition is  Reports walking will help with his pain  Occasional disturbances with sleeping  Patient denies bowel/bladder dysfunction, saddle paraesthesias, but does have cough/sneeze provocation  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  Recurrent probem    Quality of life: good    Pain  Current pain ratin  At best pain ratin  At worst pain ratin  Quality: tight, dull ache and discomfort  Relieving factors: change in position  Aggravating factors: sitting, stair climbing, standing and lifting  Progression: no change    Treatments  Treatments tried: TENS, trigger point injections  Patient Goals  Patient goals for therapy: decreased pain, increased motion and increased strength  Patient goal: less stiffness        Short Term:  1  Pt will report decreased levels of pain by at least 2 subjective ratings in 4 weeks  2  Pt will demonstrate improved ROM by at least 10 degrees in 4 weeks  3  Pt will demonstrate improved strength by 1/2 grade in 4 weeks  4  Pt will be able to wake without low back discomfort in 4 weeks  Long Term:   1  Pt will be independent in their HEP in 8 weeks  2  Pt will be pain free with IADL's in 8 weeks  3  Pt will be able to bend forward to put work booties on in 8 weeks     Objective  GAIT: slight fwd trunk lean  Squat assess: WNL  Heel toe walk: -    Lumbar  % of normal   Flex  50p! Extn  75p! SB Left 75   SB Right 75   ROT Left 100   ROT Right 75p!    Repetitive testing: extension in standing= better  extension in lying= better    flexion standing= worse          MMT    Hip L       R   Flex  5 5   Extn  4 4   Abd  5 5                 MMT    Knee         L        R   Flex  5 5   Extn  5 5              MMT    Ankle       L        R   PF 5 5   DF  5 5   EHL 5 5   Inv  5 5   Ever  5 5     Posture: poor sitting posture, postural correction improves  Slump test: L= -    R= -       Straight leg raise:   L= -     R= -             Transverse abdominis: Bent knee fall out= fair     Hip/SI joint:  Multifidus activation = poor        Insurance:  AMA/CMS Eval/ Re-eval POC expires Ty Benjamin #/ Referral # Total    Start date  Expiration date Extension  Visit limitation? PT only or  PT+OT? Co-Insurance   CMS 5 15 23 [phone] 10 23  Auth                          AUTH #:  Date 5 15              Authed: Used 1               Remaining  11                Precautions: standard  Patient provided verbal consent to treatment plan and recommended interventions      Manuals 5 15                                                   Neuro Re-Ed                                                                              Ther Ex             Press up 2*10            KALEB 2*10            Pt edu FB            Lumbar roll  nv                                                               Ther Activity                                       Modalities

## 2023-05-25 ENCOUNTER — OFFICE VISIT (OUTPATIENT)
Dept: PHYSICAL THERAPY | Facility: CLINIC | Age: 48
End: 2023-05-25

## 2023-05-25 DIAGNOSIS — M54.50 ACUTE BILATERAL LOW BACK PAIN WITHOUT SCIATICA: Primary | ICD-10-CM

## 2023-05-25 NOTE — PROGRESS NOTES
"Daily Note     Today's date: 2023  Patient name: Radha Chen  : 1975  MRN: [id number]  Referring provider: Cheryl Dowling DO  Dx:   Encounter Diagnosis     ICD-10-CM    1  Acute bilateral low back pain without sciatica  M54 50                    Subjective: patient reports some tightness in low back when bending forward  Reports some times feeling okay but other times not so well  Poor compliance to HEP reported  1 on 1 with PT for 23 minutes  Remaining time independent fitness program     Objective: See treatment diary below  Poor diaphragmatic breathing    Assessment: Tolerated treatment well  Patient educated on role of HEP, use of lumbar roll, and performance of proper mechanics when bending forward  Plan: Continue per plan of care  Insurance:  AMA/CMS Eval/ Re-eval POC expires Patricia Brimhall #/ Referral # Total    Start date  Expiration date Extension  Visit limitation? PT only or  PT+OT? Co-Insurance   CMS 5 15 23 [phone] 23  Auth                          AUTH #:  Date 5 15 5 25             Authed: Used 1 2              Remaining  11 10               Precautions: standard  Patient provided verbal consent to treatment plan and recommended interventions      Manuals  15 5 25                                              Neuro Re-Ed            TrA iso   2*10, 5\"          Diaphragmatic breathing  2*5          bridge  3*10                                  Ther Ex            Press up 2*10 2*10          KALEB 2*10 2*10          Pt edu FB 10'          Lumbar roll  FB                                                          Ther Activity                                    Modalities                                         "

## 2023-07-22 LAB
ALBUMIN SERPL-MCNC: 4.4 G/DL (ref 4.1–5.1)
ALBUMIN/GLOB SERPL: 1.7 {RATIO} (ref 1.2–2.2)
ALP SERPL-CCNC: 48 IU/L (ref 44–121)
ALT SERPL-CCNC: 35 IU/L (ref 0–44)
AST SERPL-CCNC: 25 IU/L (ref 0–40)
BILIRUB SERPL-MCNC: 0.4 MG/DL (ref 0–1.2)
BUN SERPL-MCNC: 10 MG/DL (ref 6–24)
BUN/CREAT SERPL: 10 (ref 9–20)
CALCIUM SERPL-MCNC: 9.4 MG/DL (ref 8.7–10.2)
CHLORIDE SERPL-SCNC: 101 MMOL/L (ref 96–106)
CHOLEST SERPL-MCNC: 209 MG/DL (ref 100–199)
CO2 SERPL-SCNC: 24 MMOL/L (ref 20–29)
CREAT SERPL-MCNC: 0.99 MG/DL (ref 0.76–1.27)
EGFR: 95 ML/MIN/1.73
EST. AVERAGE GLUCOSE BLD GHB EST-MCNC: 120 MG/DL
GLOBULIN SER-MCNC: 2.6 G/DL (ref 1.5–4.5)
GLUCOSE SERPL-MCNC: 99 MG/DL (ref 70–99)
HBA1C MFR BLD: 5.8 % (ref 4.8–5.6)
HDLC SERPL-MCNC: 58 MG/DL
LDLC SERPL CALC-MCNC: 127 MG/DL (ref 0–99)
LDLC/HDLC SERPL: 2.2 RATIO (ref 0–3.6)
POTASSIUM SERPL-SCNC: 4.1 MMOL/L (ref 3.5–5.2)
PROT SERPL-MCNC: 7 G/DL (ref 6–8.5)
PSA FREE MFR SERPL: 40 %
PSA FREE SERPL-MCNC: 0.12 NG/ML
PSA SERPL-MCNC: 0.3 NG/ML (ref 0–4)
SL AMB VLDL CHOLESTEROL CALC: 24 MG/DL (ref 5–40)
SODIUM SERPL-SCNC: 138 MMOL/L (ref 134–144)
TRIGL SERPL-MCNC: 133 MG/DL (ref 0–149)

## 2023-09-11 ENCOUNTER — OFFICE VISIT (OUTPATIENT)
Dept: FAMILY MEDICINE CLINIC | Facility: CLINIC | Age: 48
End: 2023-09-11
Payer: COMMERCIAL

## 2023-09-11 VITALS
WEIGHT: 177.25 LBS | TEMPERATURE: 98.1 F | RESPIRATION RATE: 21 BRPM | SYSTOLIC BLOOD PRESSURE: 110 MMHG | BODY MASS INDEX: 26.86 KG/M2 | DIASTOLIC BLOOD PRESSURE: 70 MMHG | HEART RATE: 55 BPM | HEIGHT: 68 IN | OXYGEN SATURATION: 99 %

## 2023-09-11 DIAGNOSIS — Z71.2 ENCOUNTER TO DISCUSS TEST RESULTS: ICD-10-CM

## 2023-09-11 DIAGNOSIS — H60.503 ACUTE OTITIS EXTERNA OF BOTH EARS, UNSPECIFIED TYPE: Primary | ICD-10-CM

## 2023-09-11 DIAGNOSIS — E78.00 ELEVATED LDL CHOLESTEROL LEVEL: ICD-10-CM

## 2023-09-11 DIAGNOSIS — R73.03 PREDIABETES: ICD-10-CM

## 2023-09-11 PROCEDURE — 99214 OFFICE O/P EST MOD 30 MIN: CPT | Performed by: FAMILY MEDICINE

## 2023-09-13 ENCOUNTER — TELEPHONE (OUTPATIENT)
Dept: FAMILY MEDICINE CLINIC | Facility: CLINIC | Age: 48
End: 2023-09-13

## 2023-09-13 DIAGNOSIS — H60.90 OTITIS EXTERNA: Primary | ICD-10-CM

## 2023-09-13 RX ORDER — CIPROFLOXACIN AND DEXAMETHASONE 3; 1 MG/ML; MG/ML
4 SUSPENSION/ DROPS AURICULAR (OTIC) 2 TIMES DAILY
Qty: 7.5 ML | Refills: 0 | Status: SHIPPED | OUTPATIENT
Start: 2023-09-13 | End: 2023-09-20

## 2023-09-13 NOTE — TELEPHONE ENCOUNTER
Spoke to Yamileth Adams at [employer]. Patients insurance will cover Ciprodex. He will fill that medication.

## 2023-09-13 NOTE — TELEPHONE ENCOUNTER
Pt in office asking about the medication - stated that his insurance will not cover the medication- can we send a new rx over for the generic rx. Sending tt to in office doctor.

## 2023-09-18 PROBLEM — R73.03 PREDIABETES: Status: ACTIVE | Noted: 2023-09-18

## 2023-09-18 PROBLEM — E78.00 ELEVATED LDL CHOLESTEROL LEVEL: Status: ACTIVE | Noted: 2023-09-18

## 2023-09-18 PROBLEM — H60.503 ACUTE OTITIS EXTERNA OF BOTH EARS: Status: ACTIVE | Noted: 2023-09-18

## 2023-09-18 NOTE — ASSESSMENT & PLAN NOTE
Elevated LDL at 127  · Educated patient on diet and lifestyle modifications   · No indication for medication treatment at this time   · Will recheck lipid panel in 1 year

## 2023-09-18 NOTE — ASSESSMENT & PLAN NOTE
HbA1c of 5.8 indicating pre-diabetes   · Educated patient on diet and lifestyle modifications   · Will recheck HbA1c in 1 year

## 2023-09-18 NOTE — PROGRESS NOTES
[facility] Office visit    Assessment/Plan:     1. Acute otitis externa of both ears, unspecified type  Assessment & Plan:  Bilateral erythematous canals   Potentially secondary to using alcohol spray in ears   Cannot rule out acute otitis externa     · Will send antibiotic ear drops   · Advised patient to stop using alcohol spray in ears   · Do not poke ears or put anything sharp inside ears   · Follow up in 1 week if symptoms worsen or do not improve     Orders:  - ciprofloxacin-hydrocortisone (CIPRO HC OTIC) otic suspension      2. Encounter to discuss test results  Comments:  Discussed lab results with patient during visit. Expressed understanding of results and next steps. 3. Elevated LDL cholesterol level  Assessment & Plan:  Elevated LDL at 127  · Educated patient on diet and lifestyle modifications   · No indication for medication treatment at this time   · Will recheck lipid panel in 1 year       4. Prediabetes  Assessment & Plan:  HbA1c of 5.8 indicating pre-diabetes   · Educated patient on diet and lifestyle modifications   · Will recheck HbA1c in 1 year           No follow-ups on file. Subjective:   DARRYN Hastings is a 53 y.o. male who is here to follow on his lab results. He also reports his ears have been hurting for the past few days. He states he has to use earplugs daily at work. He has been using some kind of alcohol spray in his ears, stating that it has not helped. Review of Systems   Constitutional: Negative for chills and fever. HENT: Positive for ear pain (bilateral ears). Negative for ear discharge, hearing loss and sore throat. Eyes: Negative for pain and visual disturbance. Respiratory: Negative for cough and shortness of breath. Cardiovascular: Negative for chest pain and palpitations. Gastrointestinal: Negative for abdominal pain and vomiting. Genitourinary: Negative for dysuria and hematuria.    Musculoskeletal: Negative for arthralgias and back pain. Skin: Negative for color change and rash. Neurological: Negative for seizures and syncope. All other systems reviewed and are negative. Objective:     /70 (BP Location: Left arm, Patient Position: Sitting, Cuff Size: Standard)   Pulse 55   Temp 98.1 °F (36.7 °C) (Temporal)   Resp 21   Ht 5' 8" (1.727 m)   Wt 80.4 kg (177 lb 4 oz)   SpO2 99%   BMI 26.95 kg/m²      Physical Exam  Constitutional:       General: He is not in acute distress. Appearance: He is overweight. HENT:      Head: Normocephalic and atraumatic. Right Ear: External ear normal. No decreased hearing noted. Tenderness present. Left Ear: External ear normal. No decreased hearing noted. Tenderness present. Ears:      Comments: Erythematous canal bilaterally   Cardiovascular:      Rate and Rhythm: Regular rhythm. Bradycardia present. Heart sounds: Normal heart sounds. No murmur heard. Pulmonary:      Effort: Pulmonary effort is normal. No respiratory distress. Breath sounds: Normal breath sounds. Abdominal:      General: Abdomen is flat. Bowel sounds are normal.      Palpations: Abdomen is soft. Tenderness: There is no abdominal tenderness. Musculoskeletal:      Right lower leg: No edema. Left lower leg: No edema. Skin:     General: Skin is warm. Neurological:      Mental Status: He is alert and oriented to person, place, and time.    Psychiatric:         Mood and Affect: Mood normal.         Behavior: Behavior normal.          ** Please Note: This note has been constructed using a voice recognition system Michael Lock DO  09/11/23  5:41 PM

## 2023-09-18 NOTE — ASSESSMENT & PLAN NOTE
Bilateral erythematous canals   Potentially secondary to using alcohol spray in ears   Cannot rule out acute otitis externa     · Will send antibiotic ear drops   · Advised patient to stop using alcohol spray in ears   · Do not poke ears or put anything sharp inside ears   · Follow up in 1 week if symptoms worsen or do not improve     Orders:  - ciprofloxacin-hydrocortisone (CIPRO HC OTIC) otic suspension

## 2023-10-26 ENCOUNTER — OFFICE VISIT (OUTPATIENT)
Dept: FAMILY MEDICINE CLINIC | Facility: CLINIC | Age: 48
End: 2023-10-26
Payer: COMMERCIAL

## 2023-10-26 VITALS
DIASTOLIC BLOOD PRESSURE: 74 MMHG | RESPIRATION RATE: 18 BRPM | BODY MASS INDEX: 26.76 KG/M2 | SYSTOLIC BLOOD PRESSURE: 133 MMHG | HEART RATE: 68 BPM | OXYGEN SATURATION: 99 % | WEIGHT: 176 LBS

## 2023-10-26 DIAGNOSIS — M79.642 LEFT HAND PAIN: ICD-10-CM

## 2023-10-26 DIAGNOSIS — M25.562 LEFT KNEE PAIN, UNSPECIFIED CHRONICITY: Primary | ICD-10-CM

## 2023-10-26 DIAGNOSIS — M25.512 LEFT SHOULDER PAIN, UNSPECIFIED CHRONICITY: ICD-10-CM

## 2023-10-26 PROCEDURE — 99213 OFFICE O/P EST LOW 20 MIN: CPT | Performed by: FAMILY MEDICINE

## 2023-10-26 RX ORDER — NAPROXEN 500 MG/1
500 TABLET ORAL 2 TIMES DAILY WITH MEALS
Qty: 28 TABLET | Refills: 0 | Status: SHIPPED | OUTPATIENT
Start: 2023-10-26 | End: 2023-11-09

## 2023-10-26 NOTE — PROGRESS NOTES
[facility] Office visit    Assessment/Plan:     1. Left knee pain, unspecified chronicity  Comments:  Pain 10/10, feels unsteady and weak, like he will fall  Orders:  -     Ambulatory Referral to Orthopedic Surgery; Future  -     naproxen (Naprosyn) 500 mg tablet; Take 1 tablet (500 mg total) by mouth 2 (two) times a day with meals for 14 days  -     Ambulatory Referral to Physical Therapy; Future    2. Left shoulder pain, unspecified chronicity  Comments:  Pain 10/10, worse with lack of movement, gets stiff and pain increases  Orders:  -     Ambulatory Referral to Orthopedic Surgery; Future  -     naproxen (Naprosyn) 500 mg tablet; Take 1 tablet (500 mg total) by mouth 2 (two) times a day with meals for 14 days  -     Ambulatory Referral to Physical Therapy; Future    3. Left hand pain  Comments:  Pain 10/10, unable to close hand fully. Orders:  -     Ambulatory Referral to Orthopedic Surgery; Future  -     naproxen (Naprosyn) 500 mg tablet; Take 1 tablet (500 mg total) by mouth 2 (two) times a day with meals for 14 days  -     Ambulatory Referral to Physical Therapy; Future          No follow-ups on file. Subjective:   DARRYN  Shahana Pruitt is a 53 y.o. male who presents for pain in the left shoulder, left hand, and left knee. Per patient the pain started a few months ago. Rates pain as 10/10 at it's worst, states it is constant and sharp in all locations. It does not radiate anywhere. He denies any tingling, numbness. He feels weak in his left leg and feels like he can't stand on the knee, feels like he will fall. He states he is unable to close his left hand fully and even feels the pain when he is washing his hands. He has not taken any medication for the pain. He states staying still makes the shoulder feel stiff and the pain increases. The pain starts in the morning and gets worse as the day goes on. Per patient the only thing that helps relive the pain is alcohol.       Review of Systems Constitutional:  Negative for chills and fever. HENT:  Negative for ear pain and sore throat. Eyes:  Negative for pain and visual disturbance. Respiratory:  Negative for cough and shortness of breath. Cardiovascular:  Negative for chest pain and palpitations. Gastrointestinal:  Negative for abdominal pain and vomiting. Genitourinary:  Negative for dysuria and hematuria. Musculoskeletal:  Positive for arthralgias (left shoulder, left hand, left knee). Negative for back pain. Skin:  Negative for color change and rash. Neurological:  Positive for headaches. Negative for dizziness. All other systems reviewed and are negative. Objective:     /74   Pulse 68   Resp 18   Wt 79.8 kg (176 lb)   SpO2 99%   BMI 26.76 kg/m²      Physical Exam  Constitutional:       Appearance: He is overweight. HENT:      Head: Normocephalic and atraumatic. Mouth/Throat:      Mouth: Mucous membranes are moist.   Eyes:      Extraocular Movements: Extraocular movements intact. Conjunctiva/sclera: Conjunctivae normal.   Cardiovascular:      Rate and Rhythm: Normal rate and regular rhythm. Heart sounds: Normal heart sounds. No murmur heard. Pulmonary:      Effort: Pulmonary effort is normal.      Breath sounds: Normal breath sounds. No wheezing. Abdominal:      General: Abdomen is flat. Bowel sounds are normal.      Palpations: Abdomen is soft. Tenderness: There is no abdominal tenderness. Musculoskeletal:      Right shoulder: Normal.      Left shoulder: Tenderness present. No swelling or bony tenderness. Decreased range of motion. Right hand: Normal.      Left hand: No swelling. Cervical back: Normal range of motion and neck supple. Right knee: Normal.      Left knee: Decreased range of motion. Tenderness present. No ACL laxity or PCL laxity. Normal meniscus and normal patellar mobility. Instability Tests: Anterior drawer test negative.  Posterior drawer test negative. Right lower leg: No edema. Left lower leg: No edema. Comments: Normal Drop arm test, Empty can test, Cano' impingement test, Joshua Shila test    Neurological:      Mental Status: He is alert.           ** Please Note: This note has been constructed using a voice recognition system Kim Hodges DO  10/26/23  3:54 PM

## 2023-11-07 ENCOUNTER — EVALUATION (OUTPATIENT)
Dept: PHYSICAL THERAPY | Facility: CLINIC | Age: 48
End: 2023-11-07
Payer: COMMERCIAL

## 2023-11-07 ENCOUNTER — APPOINTMENT (OUTPATIENT)
Dept: RADIOLOGY | Facility: CLINIC | Age: 48
End: 2023-11-07
Payer: COMMERCIAL

## 2023-11-07 ENCOUNTER — OFFICE VISIT (OUTPATIENT)
Dept: OBGYN CLINIC | Facility: CLINIC | Age: 48
End: 2023-11-07
Payer: COMMERCIAL

## 2023-11-07 VITALS
BODY MASS INDEX: 26.67 KG/M2 | WEIGHT: 176 LBS | HEART RATE: 59 BPM | HEIGHT: 68 IN | DIASTOLIC BLOOD PRESSURE: 89 MMHG | SYSTOLIC BLOOD PRESSURE: 135 MMHG

## 2023-11-07 DIAGNOSIS — M25.532 LEFT WRIST PAIN: ICD-10-CM

## 2023-11-07 DIAGNOSIS — Z01.89 ENCOUNTER FOR OTHER SPECIFIED SPECIAL EXAMINATIONS: ICD-10-CM

## 2023-11-07 DIAGNOSIS — M25.562 LEFT KNEE PAIN, UNSPECIFIED CHRONICITY: ICD-10-CM

## 2023-11-07 DIAGNOSIS — M25.812 IMPINGEMENT OF LEFT SHOULDER: ICD-10-CM

## 2023-11-07 DIAGNOSIS — M65.832 EXTENSOR TENOSYNOVITIS OF LEFT WRIST: Primary | ICD-10-CM

## 2023-11-07 DIAGNOSIS — M25.512 LEFT SHOULDER PAIN, UNSPECIFIED CHRONICITY: ICD-10-CM

## 2023-11-07 DIAGNOSIS — M79.642 LEFT HAND PAIN: ICD-10-CM

## 2023-11-07 DIAGNOSIS — M25.512 LEFT SHOULDER PAIN, UNSPECIFIED CHRONICITY: Primary | ICD-10-CM

## 2023-11-07 PROBLEM — M65.932 EXTENSOR TENOSYNOVITIS OF LEFT WRIST: Status: ACTIVE | Noted: 2023-11-07

## 2023-11-07 PROCEDURE — 73030 X-RAY EXAM OF SHOULDER: CPT

## 2023-11-07 PROCEDURE — 73110 X-RAY EXAM OF WRIST: CPT

## 2023-11-07 PROCEDURE — 73564 X-RAY EXAM KNEE 4 OR MORE: CPT

## 2023-11-07 PROCEDURE — 99203 OFFICE O/P NEW LOW 30 MIN: CPT | Performed by: ORTHOPAEDIC SURGERY

## 2023-11-07 PROCEDURE — 73562 X-RAY EXAM OF KNEE 3: CPT

## 2023-11-07 PROCEDURE — 97161 PT EVAL LOW COMPLEX 20 MIN: CPT

## 2023-11-07 RX ORDER — ACETAMINOPHEN 325 MG/1
650 TABLET ORAL EVERY 6 HOURS PRN
COMMUNITY

## 2023-11-07 NOTE — PROGRESS NOTES
Assessment/Plan:  1. Extensor tenosynovitis of left wrist  Cock Up Wrist Splint      2. Left knee pain, unspecified chronicity  Ambulatory Referral to Orthopedic Surgery    XR knee 4+ vw left injury      3. Impingement of left shoulder  Ambulatory Referral to Orthopedic Surgery    XR shoulder 2+ vw left    Ambulatory Referral to Physical Therapy      4. Left wrist pain  Ambulatory Referral to Orthopedic Surgery    XR wrist 3+ vw left    CANCELED: Cock Up Wrist Splint        The patient does appear to have extensor tenosynovitis of the left wrist, which is likely from his constant use of this hand at work. I did give him a cock-up wrist brace to wear at work for the next 2 weeks and then he can wean from this. He can continue the naproxen prescribed by his PCP. The patient also appears to have some impingement/tendonitis of the left shoulder. I am reassured he still has good ROM and strength today though. I prescribed him PT for this. His knee is currently doing well and he will continue to monitor this. If he fails to make progress we can consider rheumatologic workup as patient does have multiple joint complaints today. He will FU in 8 weeks for repeat evaluation. Subjective:   Sandra Dacosta is a 53 y.o. male who presents today for evaluation of left shoulder pain, left wrist pain, and left knee pain. 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 He complains of dorsal hand and wrist pain, which is worse when he works a lot. At times it is quite severe and then there are days it will not bother him at all. He denies any  numbness/tingling. If he sleeps with his fingers spread apart at night this helps. As for his knee, his pain is mostly posterior and is worse with activity, however this has gotten much better and is currently not bothering him much. Again, there are times this does not bother him at all, and other days this is more bothersome. He denies any swelling or mechanical symptoms about the knee. As for the shoulder, this has bothered him longer than the knee and the wrist. He notes pain that is worse with overhead activities and lifting. Most of his pain is about the anterior and lateral shoulder. He still notes good ROM and strength about the shoulder. The patient has no history or family history of rheumatologic conditions. Review of Systems   Constitutional: Negative. Negative for chills and fever. HENT: Negative. Negative for ear pain and sore throat. Eyes: Negative. Negative for pain and redness. Respiratory: Negative. Negative for shortness of breath and wheezing. Cardiovascular:  Negative for chest pain and palpitations. Gastrointestinal: Negative. Negative for abdominal pain and blood in stool. Endocrine: Negative. Negative for polydipsia and polyuria. Genitourinary: Negative. Negative for difficulty urinating and dysuria. Musculoskeletal:         As noted in HPI   Skin: Negative. Negative for pallor and rash. Neurological: Negative. Negative for dizziness and numbness. Hematological: Negative. Negative for adenopathy. Does not bruise/bleed easily. Psychiatric/Behavioral: Negative. Negative for confusion and suicidal ideas.           Past Medical History:   Diagnosis Date   • Anemia    • GERD (gastroesophageal reflux disease)        Past Surgical History:   Procedure Laterality Date   • DENTAL SURGERY     • NO PAST SURGERIES     • WISDOM TOOTH EXTRACTION         Family History   Problem Relation Age of Onset   • Diabetes Mother    • Diabetes Father    • Cancer Father    • Diabetes Paternal Aunt        Social History     Occupational History   • Not on file   Tobacco Use   • Smoking status: Every Day     Types: E-Cigarettes   • Smokeless tobacco: Never   • Tobacco comments:     vapes, used to smoke cigarretes 0.25 ppd for 15-20 years   Vaping Use   • Vaping Use: Every day   • Substances: Nicotine, CBD   Substance and Sexual Activity   • Alcohol use: Yes     Alcohol/week: 7.0 standard drinks of alcohol     Types: 7 Standard drinks or equivalent per week     Comment: varies.  maybe five shooters a day, last drink couple days ago   • Drug use: Not Currently   • Sexual activity: Yes     Partners: Female     Birth control/protection: None     Comment: Wife         Current Outpatient Medications:   •  acetaminophen (TYLENOL) 325 mg tablet, Take 650 mg by mouth every 6 (six) hours as needed for mild pain, Disp: , Rfl:   •  lidocaine (Lidoderm) 5 %, Apply 1 patch topically daily Remove & Discard patch within 12 hours or as directed by MD (Patient taking differently: Apply 1 patch topically if needed Remove & Discard patch within 12 hours or as directed by MD), Disp: 15 patch, Rfl: 0  •  Multiple Vitamin (multivitamin) tablet, Take 1 tablet by mouth daily, Disp: , Rfl:   •  Nutritional Supplements (VITAMIN D BOOSTER PO), Take by mouth in the morning, Disp: , Rfl:   •  atorvastatin (LIPITOR) 20 mg tablet, Take 1 tablet (20 mg total) by mouth daily, Disp: 30 tablet, Rfl: 0  •  Biotin 1 MG CAPS, Take by mouth (Patient not taking: Reported on 5/1/2023), Disp: , Rfl:   •  ciprofloxacin-dexamethasone (CIPRODEX) otic suspension, Administer 4 drops into both ears 2 (two) times a day for 7 days, Disp: 7.5 mL, Rfl: 0  •  Diclofenac Sodium (VOLTAREN) 1 %, Apply 2 g topically 4 (four) times a day To affected area (Patient not taking: Reported on 9/11/2023), Disp: 100 g, Rfl: 1  •  famotidine (PEPCID) 20 mg tablet, Take 1 tablet (20 mg total) by mouth daily (Patient not taking: Reported on 9/11/2023), Disp: 30 tablet, Rfl: 0  •  naproxen (Naprosyn) 500 mg tablet, Take 1 tablet (500 mg total) by mouth 2 (two) times a day with meals for 14 days (Patient not taking: Reported on 11/7/2023), Disp: 28 tablet, Rfl: 0  •  Omega-3 Fatty Acids (FISH OIL CONCENTRATE PO), Take by mouth in the morning (Patient not taking: Reported on 4/12/2023), Disp: , Rfl:   •  valACYclovir (VALTREX) 1,000 mg tablet, Take 2 tablets (2,000 mg total) by mouth 2 (two) times a day for 1 day, Disp: 4 tablet, Rfl: 0    No Known Allergies    Objective:  Vitals:    11/07/23 0803   BP: 135/89   Pulse: 59     Pain Score: 10-Worst pain ever      Right Knee Exam     Tenderness   The patient is experiencing no tenderness. Range of Motion   Extension:  0   Flexion:  130     Tests   Varus: negative Valgus: negative  Lachman:  Anterior - negative      Drawer:  Anterior - negative    Posterior - negative  Patellar apprehension: negative    Other   Erythema: absent  Sensation: normal  Pulse: present  Swelling: none  Effusion: no effusion present      Left Knee Exam     Tenderness   The patient is experiencing no tenderness. Range of Motion   Extension:  0   Flexion:  130     Tests   Varus: negative Valgus: negative  Lachman:  Anterior - negative      Drawer:  Anterior - negative     Posterior - negative  Patellar apprehension: negative    Other   Erythema: absent  Sensation: normal  Pulse: present  Swelling: none  Effusion: no effusion present      Right Hand Exam     Other   Erythema: absent  Sensation: normal  Pulse: present      Left Hand Exam     Tenderness   Left hand tenderness location: Mild tenderness diffuse dorsal wrist.     Range of Motion   The patient has normal left wrist ROM. Muscle Strength   Wrist extension: 5/5   Wrist flexion: 5/5     Tests   Phalen’s sign: positive (mildly positive)  Tinel's sign (median nerve): negative  Finkelstein's test: negative    Other   Erythema: absent  Sensation: normal  Pulse: present    Comments:  Sensation intact median, ulnar and radial nerve distributions. Pain with resisted lesser finger extension and wrist extension.        Right Shoulder Exam     Range of Motion   Active abduction:  170   External rotation:  50   Forward flexion:  180   Internal rotation 0 degrees:  L1     Muscle Strength   Abduction: 5/5   Internal rotation: 5/5   External rotation: 5/5   Supraspinatus: 5/5   Subscapularis: 5/5   Biceps: 5/5     Tests   Drop arm: negative    Other   Erythema: absent  Sensation: normal  Pulse: present      Left Shoulder Exam     Tenderness   The patient is experiencing tenderness in the biceps tendon (anterio glenohumeral joint. ). Range of Motion   Active abduction:  170   External rotation:  50   Forward flexion:  180   Internal rotation 0 degrees:  L1     Muscle Strength   Abduction: 5/5   Internal rotation: 5/5   External rotation: 5/5   Supraspinatus: 5/5   Subscapularis: 5/5   Biceps: 5/5     Tests   Cano test: positive  Impingement: positive  Drop arm: negative    Other   Erythema: absent  Sensation: normal  Pulse: present     Comments:  Positive Neers. Positive speeds test          Observations   Left Knee   Negative for effusion. Right Knee   Negative for effusion. Strength/Myotome Testing     Left Wrist/Hand   Wrist extension: 5  Wrist flexion: 5    Tests     Left Wrist/Hand   Positive Phalen's sign (mildly positive). Negative Finkelstein's and Tinel's sign (medial nerve). Physical Exam  Constitutional:       General: He is not in acute distress. Appearance: He is well-developed. HENT:      Head: Normocephalic and atraumatic. Eyes:      General: No scleral icterus. Conjunctiva/sclera: Conjunctivae normal.   Neck:      Vascular: No JVD. Cardiovascular:      Rate and Rhythm: Normal rate. Pulmonary:      Effort: Pulmonary effort is normal. No respiratory distress. Musculoskeletal:      Right knee: No effusion. Left knee: No effusion. Comments: As per HPI   Skin:     General: Skin is warm. Neurological:      Mental Status: He is alert and oriented to person, place, and time.       Coordination: Coordination normal.         I have personally reviewed pertinent films in PACS and my interpretation is as follows:  Xrays left wrist:No acute osseous abnormality  Xrays left shoulder:No acute osseous abnormality  Xrays left knee:No acute osseous abnormality. This document was created using speech voice recognition software. Grammatical errors, random word insertions, pronoun errors, and incomplete sentences are an occasional consequence of this system due to software limitations, ambient noise, and hardware issues. Any formal questions or concerns about content, text, or information contained within the body of this dictation should be directly addressed to the provider for clarification.

## 2023-11-07 NOTE — LETTER
2023    [address] [address]    Patient: Faustino Johnson   YOB: 1975   Date of Visit: 2023     Encounter Diagnosis     ICD-10-CM    1. Left shoulder pain, unspecified chronicity  M25.512 Ambulatory Referral to Physical Therapy    Pain 10/10, worse with lack of movement, gets stiff and pain increases      2. Left knee pain, unspecified chronicity  M25.562 Ambulatory Referral to Physical Therapy    Pain 10/10, feels unsteady and weak, like he will fall      3. Left hand pain  M79.642 Ambulatory Referral to Physical Therapy    Pain 10/10, unable to close hand fully. Dear Dr. Laisha Alexander: Thank you for your recent referral of Faustino Johnson. Please review the attached evaluation summary from José Miguel's recent visit. Please verify that you agree with the plan of care by signing the attached order. If you have any questions or concerns, please do not hesitate to call. I sincerely appreciate the opportunity to share in the care of one of your patients and hope to have another opportunity to work with you in the near future. Sincerely,    Jennifer Tejeda, PT      Referring Provider:      I certify that I have read the below Plan of Care and certify the need for these services furnished under this plan of treatment while under my care. [address] [address]  [place] [facility] [place]            PT Evaluation   Today's date: 2023  Patient name: Faustino Johnson  : 1975  MRN: [id number]  Referring provider: Bridger Stevens DO  Dx:   Encounter Diagnosis     ICD-10-CM    1. Left shoulder pain, unspecified chronicity  M25.512 Ambulatory Referral to Physical Therapy    Pain 10/10, worse with lack of movement, gets stiff and pain increases      2.  Left knee pain, unspecified chronicity  M25.562 Ambulatory Referral to Physical Therapy    Pain 10/10, feels unsteady and weak, like he will fall      3. Left hand pain  M79.642 Ambulatory Referral to Physical Therapy    Pain 10/10, unable to close hand fully. Assessment  Assessment details: Patient is a 53 y.o. Male who presents to skilled outpatient PT for referring diagnoses include left shoulder pain, left knee pain, left hand pain. Primary movement impairment diagnosis of decreased strength, impaired functional mobility resulting in pathoanatomical symptoms of L shoulder and hand pain. Patient notes that his knee symptoms have subsided at this time and would like to focus on his shoulder and hand. Discussed with patient possible referral to OT department for hand therapy if pain continues to persist in his L hand/wrist. The aforementioned impairments have limited the patient's ability to lift objects of weight, drive, and perform work related tasks. No further referral is necessary at this time based upon examination results. Patient education performed during today's session included: HEP as noted below, nature of L shoulder pain. Impairments: Abnormal muscle tone, Abnormal or restricted ROM, Impaired physical strength, Poor posture, and Pain with function  Understanding of Dx/Px/POC: Excellent  Prognosis: Excellent    Patient verbalized understanding of POC. Please contact me if you have any questions or recommendations. Thank you for the referral and the opportunity to share in [facility]. Plan  Plan details: See below.      Patient would benefit from: PT Eval and Skilled PT  Planned modality interventions: Dry needling, Biofeedback, Cryotherapy, TENS, Thermotherapy: Hydrocollator Packs, and Traction  Planned therapy interventions: Abdominal trunk stabilization, ADL training, Balance, Balance/WB training, Functional ROM exercises, Gait training, HEP, Joint mobilization, Manual therapy, Hewitt taping, Neuromuscular re-education, Patient education, Postural training, Strengthening, Stretching, Therapeutic activities, Therapeutic exercises, Therapeutic training, Transfer training, and Activity modification  Frequency: 2x/wk  Duration in weeks: 8  Plan of Care beginning date: 23  Plan of Care expiration date: 8 weeks - 2024  Treatment plan discussed with: Patient       Goals  Short Term Goals (4 weeks):    - Patient will be independent in basic HEP 2-3 weeks  - Patient will have <4/10 pain when performing work related tasks. - Patient will demonstrate >1/3 improvement in MMT grade as applicable    Long Term Goals (8 weeks):  - Patient will be independent in a comprehensive home exercise program  - Patient FOTO score will improve by 10 points. - Patient will self-report >75% improvement in function  - Patient functional goal: [place] will drive with no increased pain. Subjective    History of Present Illness  - Mechanism of injury: Patient reports L shoulder pain that is caused by lifting objects of weight. The surgeon believes that his shoulder is bothering him due to sleeping position. She is having increased pain noted as sharp/shooting. Denies radicular symptoms, such as numbness/tingling.  Patient works in delivery and installation of appliances.     - Functional limitations: lifting objects of weight, reaching behind back, driving long distances        - Patient goals: "Workout and lift weights."         Pain  - Current pain ratin/10  - At best pain ratin/10  - At worst pain rating: 10/10  - Location: L shoulder   - Alleviating factors: none        Objective   UE MMT  L Shoulder Flexion: 4/5 R Shoulder Flexion: 5/5   L Shoulder Extension: 4/5 R Shoulder Extension: 5/5   L Shoulder Abduction: 4/5 R Shoulder Abduction: 5/5   L Shoulder  IR: 4/5 R Shoulder  IR: 5/5   L Shoulder  ER: 4/5 R Shoulder  ER: 5/5   L Elbow Extension: 5/5 R Elbow Extension: 5/5   L Elbow Flexion: 5/5 R Elbow Flexion: 5/5   L Wrist Flexion: 5/5 R Wrist Flexion: 5/5   L Wrist Extension: 5/5 R Wrist Extension: 5/5         Cervical AROM    Flexion 100%   Extension 100%   Side bending R 100%   Side bending L  100%   Rotation R 100%   Rotation L 100%     Repeated motion assessment    Repeated retraction NE   Repeated protraction NE   Repeated retraction extension  NE   Repeated extension  NT   Repeated L lateral flexion  NT   Repeated R lateral flexion  NT   Sustained protraction NT     Deep neck flexor endurance: poor        Sensation  - Light touch: intact       Special Testing L R   Compression negative negative   Distraction negative negative   Cervical rotation lateral flexion positive negative   C1 fracture  negative negative   Alar ligament negative negative   Vertebral artery (VBI)  negative negative   Sharp Gerda  negative negative   Neurodynamic assessment  NT NT               Insurance:  AMA/CMS Eval/ Re-eval POC expires [place] Math #/ Referral # Total   Visits  Start date  Expiration date Extension  Visit limitation? PT only or  PT+OT?  Co-Insurance   [facility] 11/7 1/2  Auth submitted 11/7     BOMN  PT $0                                                                 Aysha Cart #:  Date 11/7              Visits  Authed:  Used                Remaining                     Precautions: standard   Past Medical History:   Diagnosis Date   • Anemia    • GERD (gastroesophageal reflux disease)          Date 11/7/2023        Visit Number IE                 Manual                                    Neuro Re-ed                                                      TherEx         Pulleys          TB shoulder ext          TB row  10 BTB        TB IR/ER  10 BTB each        No money          1st rib mob with SOS  10        Wall slide with lift off 10 blue loop                                                                        TherAct         Patient education                                             Gait Training                                    Modalities         CP

## 2023-11-07 NOTE — PROGRESS NOTES
PT Evaluation   Today's date: 2023  Patient name: Shahana Pruitt  : 1975  MRN: [id number]  Referring provider: Halle Anderson DO  Dx:   Encounter Diagnosis     ICD-10-CM    1. Left shoulder pain, unspecified chronicity  M25.512 Ambulatory Referral to Physical Therapy    Pain 10/10, worse with lack of movement, gets stiff and pain increases      2. Left knee pain, unspecified chronicity  M25.562 Ambulatory Referral to Physical Therapy    Pain 10/10, feels unsteady and weak, like he will fall      3. Left hand pain  M79.642 Ambulatory Referral to Physical Therapy    Pain 10/10, unable to close hand fully. Assessment  Assessment details: Patient is a 53 y.o. Male who presents to skilled outpatient PT for referring diagnoses include left shoulder pain, left knee pain, left hand pain. Primary movement impairment diagnosis of decreased strength, impaired functional mobility resulting in pathoanatomical symptoms of L shoulder and hand pain. Patient notes that his knee symptoms have subsided at this time and would like to focus on his shoulder and hand. Discussed with patient possible referral to OT department for hand therapy if pain continues to persist in his L hand/wrist. The aforementioned impairments have limited the patient's ability to lift objects of weight, drive, and perform work related tasks. No further referral is necessary at this time based upon examination results. Patient education performed during today's session included: HEP as noted below, nature of L shoulder pain. Impairments: Abnormal muscle tone, Abnormal or restricted ROM, Impaired physical strength, Poor posture, and Pain with function  Understanding of Dx/Px/POC: Excellent  Prognosis: Excellent    Patient verbalized understanding of POC. Please contact me if you have any questions or recommendations. Thank you for the referral and the opportunity to share in [facility]'s care. Plan  Plan details: See below. Patient would benefit from: PT Eval and Skilled PT  Planned modality interventions: Dry needling, Biofeedback, Cryotherapy, TENS, Thermotherapy: Hydrocollator Packs, and Traction  Planned therapy interventions: Abdominal trunk stabilization, ADL training, Balance, Balance/WB training, Functional ROM exercises, Gait training, HEP, Joint mobilization, Manual therapy, Hewitt taping, Neuromuscular re-education, Patient education, Postural training, Strengthening, Stretching, Therapeutic activities, Therapeutic exercises, Therapeutic training, Transfer training, and Activity modification  Frequency: 2x/wk  Duration in weeks: 8  Plan of Care beginning date: 23  Plan of Care expiration date: 8 weeks - 2024  Treatment plan discussed with: Patient       Goals  Short Term Goals (4 weeks):    - Patient will be independent in basic HEP 2-3 weeks  - Patient will have <4/10 pain when performing work related tasks. - Patient will demonstrate >1/3 improvement in MMT grade as applicable    Long Term Goals (8 weeks):  - Patient will be independent in a comprehensive home exercise program  - Patient FOTO score will improve by 10 points. - Patient will self-report >75% improvement in function  - Patient functional goal: [place] will drive with no increased pain. Subjective    History of Present Illness  - Mechanism of injury: Patient reports L shoulder pain that is caused by lifting objects of weight. The surgeon believes that his shoulder is bothering him due to sleeping position. She is having increased pain noted as sharp/shooting. Denies radicular symptoms, such as numbness/tingling.  Patient works in delivery and installation of appliances.     - Functional limitations: lifting objects of weight, reaching behind back, driving long distances        - Patient goals: "Workout and lift weights."         Pain  - Current pain ratin/10  - At best pain ratin/10  - At worst pain rating: 10/10  - Location: L shoulder   - Alleviating factors: none        Objective   UE MMT  L Shoulder Flexion: 4/5 R Shoulder Flexion: 5/5   L Shoulder Extension: 4/5 R Shoulder Extension: 5/5   L Shoulder Abduction: 4/5 R Shoulder Abduction: 5/5   L Shoulder  IR: 4/5 R Shoulder  IR: 5/5   L Shoulder  ER: 4/5 R Shoulder  ER: 5/5   L Elbow Extension: 5/5 R Elbow Extension: 5/5   L Elbow Flexion: 5/5 R Elbow Flexion: 5/5   L Wrist Flexion: 5/5 R Wrist Flexion: 5/5   L Wrist Extension: 5/5 R Wrist Extension: 5/5         Cervical AROM    Flexion 100%   Extension 100%   Side bending R 100%   Side bending L  100%   Rotation R 100%   Rotation L 100%     Repeated motion assessment    Repeated retraction NE   Repeated protraction NE   Repeated retraction extension  NE   Repeated extension  NT   Repeated L lateral flexion  NT   Repeated R lateral flexion  NT   Sustained protraction NT     Deep neck flexor endurance: poor        Sensation  - Light touch: intact       Special Testing L R   Compression negative negative   Distraction negative negative   Cervical rotation lateral flexion positive negative   C1 fracture  negative negative   Alar ligament negative negative   Vertebral artery (VBI)  negative negative   Sharp Gerda  negative negative   Neurodynamic assessment  NT NT               Insurance:  AMA/CMS Eval/ Re-eval POC expires Haven Perrin #/ Referral # Total   Visits  Start date  Expiration date Extension  Visit limitation? PT only or  PT+OT?  Co-Insurance   [facility], [facility] 11/7 1/2  Auth submitted 11/7     BOMN  PT $0                                                                 Tapan Browning #:  Date 11/7              Visits  Authed:  Used                Remaining                     Precautions: standard   Past Medical History:   Diagnosis Date    Anemia     GERD (gastroesophageal reflux disease)          Date 11/7/2023        Visit Number IE                 Manual                                    Neuro Re-ed TherEx         Pulleys          TB shoulder ext          TB row  10 BTB        TB IR/ER  10 BTB each        No money          1st rib mob with SOS  10        Wall slide with lift off 10 blue loop                                                                        TherAct         Patient education                                             Gait Training                                    Modalities         CP

## 2023-11-14 ENCOUNTER — OFFICE VISIT (OUTPATIENT)
Dept: PHYSICAL THERAPY | Facility: CLINIC | Age: 48
End: 2023-11-14
Payer: COMMERCIAL

## 2023-11-14 DIAGNOSIS — M79.642 LEFT HAND PAIN: ICD-10-CM

## 2023-11-14 DIAGNOSIS — M25.562 LEFT KNEE PAIN, UNSPECIFIED CHRONICITY: Primary | ICD-10-CM

## 2023-11-14 DIAGNOSIS — M25.512 LEFT SHOULDER PAIN, UNSPECIFIED CHRONICITY: ICD-10-CM

## 2023-11-14 PROCEDURE — 97110 THERAPEUTIC EXERCISES: CPT

## 2023-11-14 NOTE — PROGRESS NOTES
Daily Note     Today's date: 2023  Patient name: Caden Vaughn  : 1975  MRN: [id number]  Referring provider: Jennifer Peguero DO  Dx:   Encounter Diagnosis     ICD-10-CM    1. Left knee pain, unspecified chronicity  M25.562       2. Left shoulder pain, unspecified chronicity  M25.512       3. Left hand pain  M79.642           Start Time: 0800  Stop Time: 0838  Total time in clinic (min): 38 minutes    Subjective: Patient notes increased pain of his L shoulder when raising his shoulder above 90 degrees both in flexion and abduction. He also notes hand pain he is ringing out a towel. Objective: See treatment diary below      Assessment: Tolerated treatment well. Patient notes increased pain of his L shoulder when performing overhead mobility and activities into abduction. Good form and ROM demonstrated throughout session. Requires cueing for pacing of exercises. Able to correct. Will continue to progress as tolerated. Patient would benefit from continued PT      Plan: Continue per plan of care. Insurance:  AMA/CMS Eval/ Re-eval POC expires Peter Flatness #/ Referral # Total   Visits  Start date  Expiration date Extension  Visit limitation? PT only or  PT+OT?  Co-Insurance   [facility]   Approved  12 24  BOMN  PT $0                                                                 Heather Frikurtis #:  Date              Visits  Authed: 12 Used 1 1              Remaining  11 10                  Precautions: standard   Past Medical History:   Diagnosis Date    Anemia     GERD (gastroesophageal reflux disease)          Date 23       Visit Number IE 2                Manual                                    Neuro Re-ed         Serratus push up  2x10       Ball on wall   Up/down, L/R, circles x30 each                                   TherEx         Pulleys   20       TB shoulder ext   2x10 BTB       TB row  10 BTB 2x10 BTB       TB IR/ER  10 BTB each 2x10 BTB each        No money   2x10 blue loop       1st rib mob with SOS  10 2x10       Wall slide with lift off 10 blue loop  10 blue loop        CC tricep ext  2x10 17.5#       CC offset push press  2x10 7.5#       CC D2 flexion  2x10 2.5#       Prone ext, row, T  2x10 each                                   TherAct         Patient education                                             Gait Training                                    Modalities         CP

## 2023-11-17 PROBLEM — H60.503 ACUTE OTITIS EXTERNA OF BOTH EARS: Status: RESOLVED | Noted: 2023-09-18 | Resolved: 2023-11-17

## 2023-11-21 ENCOUNTER — OFFICE VISIT (OUTPATIENT)
Dept: PHYSICAL THERAPY | Facility: CLINIC | Age: 48
End: 2023-11-21
Payer: COMMERCIAL

## 2023-11-21 DIAGNOSIS — M79.642 LEFT HAND PAIN: ICD-10-CM

## 2023-11-21 DIAGNOSIS — M25.562 LEFT KNEE PAIN, UNSPECIFIED CHRONICITY: Primary | ICD-10-CM

## 2023-11-21 DIAGNOSIS — M25.512 LEFT SHOULDER PAIN, UNSPECIFIED CHRONICITY: ICD-10-CM

## 2023-11-21 PROCEDURE — 97110 THERAPEUTIC EXERCISES: CPT | Performed by: PHYSICAL THERAPIST

## 2023-11-21 NOTE — PROGRESS NOTES
Daily Note     Today's date: 2023  Patient name: Leticia Dasilva  : 1975  MRN: [id number]  Referring provider: Beverly Dick DO  Dx:   Encounter Diagnosis     ICD-10-CM    1. Left knee pain, unspecified chronicity  M25.562       2. Left shoulder pain, unspecified chronicity  M25.512       3. Left hand pain  M79.642                      Subjective: Pain still left shoulder especially with Horizontal abduction to don a jacket. Objective: See treatment diary below      Assessment: Tolerated treatment well. Patient would benefit from continued PT  to improve scapular stability and improve anterior tightness. Plan: Continue per plan of care. Insurance:  AMA/CMS Eval/ Re-eval POC expires Nae Banegas #/ Referral # Total   Visits  Start date  Expiration date Extension  Visit limitation? PT only or  PT+OT?  Co-Insurance   [facility], [facility]   Approved  12 24  BOMN  PT $0                                                                 AUTH #:  Date             Visits  Authed: 12 Used 1 1 1             Remaining  11 10 9                 Precautions: standard   Past Medical History:   Diagnosis Date    Anemia     GERD (gastroesophageal reflux disease)          Date 23      Visit Number IE 2 3               Manual                                    Neuro Re-ed         Serratus push up  2x10 20      Ball on wall   Up/down, L/R, circles x30 each  30 ea      S/L rotation    5x ea side                        TherEx         Pulleys   20 20      TB shoulder ext   2x10 BTB 30  BkTB      TB row  10 BTB 2x10 BTB 30  BkTB      TB IR/ER  10 BTB each 2x10 BTB each        No money   2x10 blue loop 20  BTB      1st rib mob with SOS  10 2x10 20      Wall slide with lift off 10 blue loop  10 blue loop  20 blue loop      CC tricep ext  2x10 17.5# 20  17.5#      CC offset push press  2x10 7.5# 20  12.5#      CC D2 flexion  2x10 2.5#       Prone ext, row, T  2x10 each  20 ea 3s                                 TherAct         Patient education                                             Gait Training                                    Modalities         CP

## 2023-11-30 ENCOUNTER — OFFICE VISIT (OUTPATIENT)
Dept: PHYSICAL THERAPY | Facility: CLINIC | Age: 48
End: 2023-11-30
Payer: COMMERCIAL

## 2023-11-30 DIAGNOSIS — M25.512 LEFT SHOULDER PAIN, UNSPECIFIED CHRONICITY: ICD-10-CM

## 2023-11-30 DIAGNOSIS — M79.642 LEFT HAND PAIN: ICD-10-CM

## 2023-11-30 DIAGNOSIS — M25.562 LEFT KNEE PAIN, UNSPECIFIED CHRONICITY: Primary | ICD-10-CM

## 2023-11-30 PROCEDURE — 97110 THERAPEUTIC EXERCISES: CPT

## 2023-11-30 NOTE — PROGRESS NOTES
Daily Note     Today's date: 2023  Patient name: Denzel Wood  : 1975  MRN: [id number]  Referring provider: Dejon Estrada DO  Dx:   Encounter Diagnosis     ICD-10-CM    1. Left knee pain, unspecified chronicity  M25.562       2. Left shoulder pain, unspecified chronicity  M25.512       3. Left hand pain  M79.642           Subjective: Patient reports most pain in L shoulder especially when putting on a jacket. Patient notes pain only with movement. Objective: See treatment diary below      Assessment: Tolerated treatment well. Only few verbal cues required for scaption technique today to limit UT involvement and encourage periscap stabilization. Post strengthening and stabilization exercises patient asked to repeat comparable sign (donning jacket) and pt reporting less pain than this morning pre PT session. Pt encouraged to complete HEP and given education regarding benefits of compliance in recovery outcomes. Patient demonstrated fatigue post treatment and would benefit from continued PT to continue with symptom management. Plan: Continue per plan of care. Insurance:  AMA/CMS Eval/ Re-eval POC expires Junnie Kanner #/ Referral # Total   Visits  Start date  Expiration date Extension  Visit limitation? PT only or  PT+OT?  Co-Insurance   [facility], [facility]   Approved  24  BOMN  PT $0                                                                 AUTH #:  Date            Visits  Authed: 12 Used 1 1 1 1            Remaining  11 10 9 8                Precautions: standard   Past Medical History:   Diagnosis Date    Anemia     GERD (gastroesophageal reflux disease)          Date 23     Visit Number IE 2 3 4              Manual             ACJ mobs  gr III and GHJ long axis distraction 2'                       Neuro Re-ed         Serratus push up  2x10 20 3#db 20x     Ball on wall   Up/down, L/R, circles x30 each  30 ea      S/L rotation    5x ea side Open books 15x                       TherEx         Pulleys   20 20 20     TB shoulder ext   2x10 BTB 30  BkTB 30 bkTB     TB row  10 BTB 2x10 BTB 30  BkTB 30 bkTB     TB IR/ER  10 BTB each 2x10 BTB each        No money   2x10 blue loop 20  BTB 20x BTB     1st rib mob with SOS  10 2x10 20 10x     Wall slide with lift off 10 blue loop  10 blue loop  20 blue loop 10x blue loop     CC tricep ext  2x10 17.5# 20  17.5# 2x10  17.5#     CC offset push press  2x10 7.5# 20  12.5# 2x10 12.5#     CC D2 flexion  2x10 2.5#  2x10 4# at CC     Prone ext, row, T  2x10 each  20 ea 3s      Pec stretch     Pec stretch at doorway 6x10"         Pec fly with BkTB 2x10         Scaption 3# db 2x10     TherAct         Patient education                                             Gait Training                                    Modalities         CP

## 2023-12-18 ENCOUNTER — OFFICE VISIT (OUTPATIENT)
Dept: PHYSICAL THERAPY | Facility: CLINIC | Age: 48
End: 2023-12-18
Payer: COMMERCIAL

## 2023-12-18 DIAGNOSIS — M25.512 LEFT SHOULDER PAIN, UNSPECIFIED CHRONICITY: ICD-10-CM

## 2023-12-18 DIAGNOSIS — M79.642 LEFT HAND PAIN: ICD-10-CM

## 2023-12-18 DIAGNOSIS — M25.562 LEFT KNEE PAIN, UNSPECIFIED CHRONICITY: Primary | ICD-10-CM

## 2023-12-18 PROCEDURE — 97110 THERAPEUTIC EXERCISES: CPT

## 2023-12-18 NOTE — PROGRESS NOTES
Daily Note     Today's date: 2023  Patient name: José Miguel Lazaro  : 1975  MRN: [id number]  Referring provider: Ailyn Callahan DO  Dx:   Encounter Diagnosis     ICD-10-CM    1. Left knee pain, unspecified chronicity  M25.562       2. Left shoulder pain, unspecified chronicity  M25.512       3. Left hand pain  M79.642           Subjective: Patient notes increased pain when he is putting on his jacket.       Objective: See treatment diary below      Assessment: Tolerated treatment well. Patient demonstrates difficulties with performing 90/90 ER due to lack of appropriate strength to maintain positioning. Patient is progressing well in terms of ROM and strength of his L UE. Will continue to focus strengthening as tolerated. Patient demonstrated fatigue post treatment and would benefit from continued PT to continue with symptom management.       Plan: Continue per plan of care.      Insurance:  AMA/CMS Eval/ Re-eval POC expires FOTO Auth #/ Referral # Total   Visits  Start date  Expiration date Extension  Visit limitation?  PT only or  PT+OT? Co-Insurance   [facility]   Approved  24  BOMN  PT $0                                                                 AUTH #:  Date           Visits  Authed: 12 Used 1 1 1 1 1           Remaining  11 10 9 8 7               Precautions: standard   Past Medical History:   Diagnosis Date    Anemia     GERD (gastroesophageal reflux disease)          Date 23    Visit Number IE 2 3 4 5             Manual             ACJ mobs  gr III and GHJ long axis distraction 2'                       Neuro Re-ed         Serratus push up  2x10 20 3#db 20x     Ball on wall   Up/down, L/R, circles x30 each  30 ea  X30 L/R, up/down, circles each     S/L rotation    5x ea side Open books 15x                       TherEx         Pulleys   20 20 20     TB shoulder ext   2x10 BTB 30  BkTB 30 bkTB 2x10 7.5# CC     TB  "row  10 BTB 2x10 BTB 30  BkTB 30 bkTB 2x10 7.5# CC    TB IR/ER  10 BTB each 2x10 BTB each    2x10 each 4# CC    90/90 ER 2x10 2.5#    No money   2x10 blue loop 20  BTB 20x BTB     1st rib mob with SOS  10 2x10 20 10x     Wall slide with lift off 10 blue loop  10 blue loop  20 blue loop 10x blue loop 2x10 blue loop     CC tricep ext  2x10 17.5# 20  17.5# 2x10  17.5#     CC offset push press  2x10 7.5# 20  12.5# 2x10 12.5# 2x10 12.5#    CC D2 flexion  2x10 2.5#  2x10 4# at CC 2x10 4# CC    Prone ext, row, T  2x10 each  20 ea 3s      Pec stretch     Pec stretch at doorway 6x10\"         Pec fly with BkTB 2x10     Wall clocks      X5 B blue loop    TRX pull ups and push ups     2x10 each         Scaption 3# db 2x10 Shoulder flex, abd, scap x10 each 4#    Overhead press 2x10 4#     TherAct         Patient education                                             Gait Training                                    Modalities         CP                        "

## 2023-12-21 ENCOUNTER — OFFICE VISIT (OUTPATIENT)
Dept: PHYSICAL THERAPY | Facility: CLINIC | Age: 48
End: 2023-12-21
Payer: COMMERCIAL

## 2023-12-21 DIAGNOSIS — M79.642 LEFT HAND PAIN: ICD-10-CM

## 2023-12-21 DIAGNOSIS — M25.512 LEFT SHOULDER PAIN, UNSPECIFIED CHRONICITY: ICD-10-CM

## 2023-12-21 DIAGNOSIS — M25.562 LEFT KNEE PAIN, UNSPECIFIED CHRONICITY: Primary | ICD-10-CM

## 2023-12-21 PROCEDURE — 97110 THERAPEUTIC EXERCISES: CPT

## 2023-12-21 NOTE — PROGRESS NOTES
Daily Note     Today's date: 2023  Patient name: José Miguel Lazaro  : 1975  MRN: [id number]  Referring provider: Ailyn Callahan DO  Dx:   Encounter Diagnosis     ICD-10-CM    1. Left knee pain, unspecified chronicity  M25.562       2. Left shoulder pain, unspecified chronicity  M25.512       3. Left hand pain  M79.642             Subjective: Patient denies changes in his strength, ROM, or pain levels since starting PT intervention.       Objective: See treatment diary below      Assessment: Tolerated treatment well. Patient and therapist had discussion on progress and lack thereof. Provided patient with comprehensive HEP to maximize strength and hold on PT until patient sees surgeon on 24. Patient demonstrates good form and ROM during today's visit. Will fiollow u with patient after he sees the surgeon. Patient demonstrated fatigue post treatment and would benefit from continued PT to continue with symptom management.       Plan: Continue per plan of care.      Insurance:  AMA/CMS Eval/ Re-eval POC expires FOTO Auth #/ Referral # Total   Visits  Start date  Expiration date Extension  Visit limitation?  PT only or  PT+OT? Co-Insurance   [facility]   Approved  24  BOMN  PT $0                                                                 AUTH #:  Date          Visits  Authed: 12 Used 1 1 1 1 1 1          Remaining  11 10 9 8 7 6              Precautions: standard   Past Medical History:   Diagnosis Date    Anemia     GERD (gastroesophageal reflux disease)          Date 23   Visit Number IE 2 3 4 5 6            Manual             ACJ mobs  gr III and GHJ long axis distraction 2'                       Neuro Re-ed         Serratus push up  2x10 20 3#db 20x  Ball on wall x30 circles each direction   Ball on wall   Up/down, L/R, circles x30 each  30 ea  X30 L/R, up/down, circles each  X30 L/R, up/down, circles  "each    S/L rotation    5x ea side Open books 15x                       TherEx         Pulleys   20 20 20     TB shoulder ext   2x10 BTB 30  BkTB 30 bkTB 2x10 7.5# CC  2x10 BlkTB   TB row  10 BTB 2x10 BTB 30  BkTB 30 bkTB 2x10 7.5# CC 2x10 BlkTB   TB IR/ER  10 BTB each 2x10 BTB each    2x10 each 4# CC    90/90 ER 2x10 2.5# 2x10 BlkTB    No money   2x10 blue loop 20  BTB 20x BTB  2x10 GTB   1st rib mob with SOS  10 2x10 20 10x     Wall slide with lift off 10 blue loop  10 blue loop  20 blue loop 10x blue loop 2x10 blue loop  2x10 GTB   CC tricep ext  2x10 17.5# 20  17.5# 2x10  17.5#     CC offset push press  2x10 7.5# 20  12.5# 2x10 12.5# 2x10 12.5#    CC D2 flexion  2x10 2.5#  2x10 4# at CC 2x10 4# CC 2x10 BlkTB   Prone ext, row, T  2x10 each  20 ea 3s      Pec stretch     Pec stretch at doorway 6x10\"  10x10s at door       Pec fly with BkTB 2x10     Wall clocks      X5 B blue loop    TRX pull ups and push ups     2x10 each         Scaption 3# db 2x10 Shoulder flex, abd, scap x10 each 4#    Overhead press 2x10 4#  Shoulder flex, abd, scap x10 each 4#   TherAct         Patient education                                             Gait Training                                    Modalities         CP                        "

## 2024-01-02 ENCOUNTER — OFFICE VISIT (OUTPATIENT)
Dept: OBGYN CLINIC | Facility: CLINIC | Age: 49
End: 2024-01-02
Payer: COMMERCIAL

## 2024-01-02 VITALS
BODY MASS INDEX: 26.67 KG/M2 | SYSTOLIC BLOOD PRESSURE: 124 MMHG | HEART RATE: 75 BPM | DIASTOLIC BLOOD PRESSURE: 86 MMHG | HEIGHT: 68 IN | WEIGHT: 176 LBS

## 2024-01-02 DIAGNOSIS — M25.562 LEFT KNEE PAIN, UNSPECIFIED CHRONICITY: ICD-10-CM

## 2024-01-02 DIAGNOSIS — M25.812 IMPINGEMENT OF LEFT SHOULDER: Primary | ICD-10-CM

## 2024-01-02 DIAGNOSIS — M23.92 INTERNAL DERANGEMENT OF LEFT KNEE: ICD-10-CM

## 2024-01-02 DIAGNOSIS — M54.50 BILATERAL LOW BACK PAIN WITHOUT SCIATICA, UNSPECIFIED CHRONICITY: ICD-10-CM

## 2024-01-02 DIAGNOSIS — M65.832 EXTENSOR TENOSYNOVITIS OF LEFT WRIST: ICD-10-CM

## 2024-01-02 PROCEDURE — 99213 OFFICE O/P EST LOW 20 MIN: CPT | Performed by: ORTHOPAEDIC SURGERY

## 2024-01-02 RX ORDER — MELOXICAM 15 MG/1
15 TABLET ORAL DAILY
Qty: 30 TABLET | Refills: 0 | Status: SHIPPED | OUTPATIENT
Start: 2024-01-02 | End: 2024-02-01

## 2024-01-02 NOTE — PROGRESS NOTES
Assessment/Plan:  1. Impingement of left shoulder  Diclofenac Sodium (VOLTAREN) 1 %    meloxicam (Mobic) 15 mg tablet      2. Left knee pain, unspecified chronicity  Diclofenac Sodium (VOLTAREN) 1 %    meloxicam (Mobic) 15 mg tablet      3. Extensor tenosynovitis of left wrist  Diclofenac Sodium (VOLTAREN) 1 %    meloxicam (Mobic) 15 mg tablet      4. Bilateral low back pain without sciatica, unspecified chronicity  Ambulatory referral to Spine & Pain Management      5. Internal derangement of left knee          Scribe Attestation    I,:  Cony Sanabria am acting as a scribe while in the presence of the attending physician.:       I,:  Jordy Savage MD personally performed the services described in this documentation    as scribed in my presence.:             José Miguel is a pleasant 48 year-old male who presents for follow-up evaluation of the left knee, wrist, and shoulder. I believe the symptoms he is experiencing in the shoulder is inflammation involving the rotator cuff and impingement of the rotator cuff tendons. I would like him to continue his efforts at physical therapy for the left knee and shoulder as I believe he may continue to see more improvement. I recommended Voltaren gel over the shoulder to help reduce the inflammation. He was provided a prescription for both Voltaren Gel and Mobic 15 mg.  Knee pain is mild and there is no mechanical symptoms at this time.  The anti-inflammatory medication should help with this as well.  Continue to do physical therapy for the knee.  If he fails to improve or worsens we discussed getting MRIs in the future.  We also discussed corticosteroid injections which he would like to hold off on at this time.  His wrist pain has completely resolved.  He was provided with a referral to Spine and Pain Management to be evaluated for his low back pain. I will see him back for follow-up evaluation of the left knee and left shoulder in 8 weeks.    Subjective:   José Miguel Lazaro is  a 48 y.o. male who presents for follow-up evaluation of the left knee, wrist, and shoulder. He has been attending physical therapy for all three body parts. He reports physical therapy has been going okay. He feels he is making progress slowly. He notes he still has the same uncomfortable feeling in his left shoulder with certain movements. He denies feeling anything in the shoulder at rest. He reports the left knee pain is intermittent. He notes standing for long periods of time and moving heavy objects tends to aggravate the left knee. He reports the left wrist pain has improved since his last visit on 11/7/2023. He reports wearing the brace helped. He has been taking naproxen as needed for his pain.       Review of Systems   Constitutional:  Negative for chills and fever.   HENT:  Negative for ear pain and sore throat.    Eyes:  Negative for pain and visual disturbance.   Respiratory:  Negative for cough and shortness of breath.    Cardiovascular:  Negative for chest pain and palpitations.   Gastrointestinal:  Negative for abdominal pain and vomiting.   Genitourinary:  Negative for dysuria and hematuria.   Musculoskeletal:  Positive for arthralgias and myalgias. Negative for back pain.   Skin:  Negative for color change and rash.   Neurological:  Negative for seizures and syncope.   All other systems reviewed and are negative.        Past Medical History:   Diagnosis Date   • Anemia    • GERD (gastroesophageal reflux disease)        Past Surgical History:   Procedure Laterality Date   • DENTAL SURGERY     • NO PAST SURGERIES     • WISDOM TOOTH EXTRACTION         Family History   Problem Relation Age of Onset   • Diabetes Mother    • Diabetes Father    • Cancer Father    • Diabetes Paternal Aunt        Social History     Occupational History   • Not on file   Tobacco Use   • Smoking status: Every Day     Types: E-Cigarettes   • Smokeless tobacco: Never   • Tobacco comments:     vapes, used to smoke cigarretes 0.25  ppd for 15-20 years   Vaping Use   • Vaping status: Every Day   • Substances: Nicotine, CBD   Substance and Sexual Activity   • Alcohol use: Yes     Alcohol/week: 7.0 standard drinks of alcohol     Types: 7 Standard drinks or equivalent per week     Comment: varies. maybe five shooters a day, last drink couple days ago   • Drug use: Not Currently   • Sexual activity: Yes     Partners: Female     Birth control/protection: None     Comment: Wife         Current Outpatient Medications:   •  acetaminophen (TYLENOL) 325 mg tablet, Take 650 mg by mouth every 6 (six) hours as needed for mild pain, Disp: , Rfl:   •  Diclofenac Sodium (VOLTAREN) 1 %, Apply 2 g topically 4 (four) times a day To affected area, Disp: 100 g, Rfl: 1  •  lidocaine (Lidoderm) 5 %, Apply 1 patch topically daily Remove & Discard patch within 12 hours or as directed by MD (Patient taking differently: Apply 1 patch topically if needed Remove & Discard patch within 12 hours or as directed by MD), Disp: 15 patch, Rfl: 0  •  meloxicam (Mobic) 15 mg tablet, Take 1 tablet (15 mg total) by mouth daily, Disp: 30 tablet, Rfl: 0  •  Multiple Vitamin (multivitamin) tablet, Take 1 tablet by mouth daily, Disp: , Rfl:   •  atorvastatin (LIPITOR) 20 mg tablet, Take 1 tablet (20 mg total) by mouth daily, Disp: 30 tablet, Rfl: 0  •  Biotin 1 MG CAPS, Take by mouth (Patient not taking: Reported on 5/1/2023), Disp: , Rfl:   •  ciprofloxacin-dexamethasone (CIPRODEX) otic suspension, Administer 4 drops into both ears 2 (two) times a day for 7 days, Disp: 7.5 mL, Rfl: 0  •  famotidine (PEPCID) 20 mg tablet, Take 1 tablet (20 mg total) by mouth daily (Patient not taking: Reported on 9/11/2023), Disp: 30 tablet, Rfl: 0  •  naproxen (Naprosyn) 500 mg tablet, Take 1 tablet (500 mg total) by mouth 2 (two) times a day with meals for 14 days (Patient not taking: Reported on 11/7/2023), Disp: 28 tablet, Rfl: 0  •  Nutritional Supplements (VITAMIN D BOOSTER PO), Take by mouth in  the morning (Patient not taking: Reported on 1/2/2024), Disp: , Rfl:   •  Omega-3 Fatty Acids (FISH OIL CONCENTRATE PO), Take by mouth in the morning (Patient not taking: Reported on 4/12/2023), Disp: , Rfl:   •  valACYclovir (VALTREX) 1,000 mg tablet, Take 2 tablets (2,000 mg total) by mouth 2 (two) times a day for 1 day, Disp: 4 tablet, Rfl: 0    No Known Allergies    Objective:  Vitals:    01/02/24 0813   BP: 124/86   Pulse: 75       Left Knee Exam     Tenderness   The patient is experiencing no tenderness.     Range of Motion   Extension:  0   Flexion:  130     Tests   Varus: negative Valgus: negative  Lachman:  Anterior - negative      Drawer:  Anterior - negative     Posterior - negative  Patellar apprehension: negative    Other   Erythema: absent  Sensation: normal  Pulse: present  Swelling: none  Effusion: no effusion present      Back Exam     Muscle Strength   Right Quadriceps:  5/5   Left Quadriceps:  5/5     Reflexes   Patellar:  normal    Comments:  Dermatomes/Myotomes WNL      Left Hand Exam     Tenderness   Left hand tenderness location: Mild tenderness diffuse dorsal wrist.       Left Shoulder Exam     Tenderness   The patient is experiencing tenderness in the biceps tendon (anterior glenohumeral joint.).    Range of Motion   Active abduction:  170   External rotation:  50   Forward flexion:  180   Internal rotation 0 degrees:  L1     Muscle Strength   Abduction: 5/5   Internal rotation: 5/5   External rotation: 5/5   Supraspinatus: 5/5   Subscapularis: 5/5   Biceps: 5/5     Tests   Cano test: positive  Impingement: positive  Drop arm: negative    Other   Erythema: absent  Sensation: normal  Pulse: present     Comments:  Positive Neers. Positive speeds test. Positive empty can          Observations   Left Knee   Negative for effusion.       Physical Exam  Vitals and nursing note reviewed.   Constitutional:       Appearance: Normal appearance.   HENT:      Head: Normocephalic and atraumatic.       Right Ear: External ear normal.      Left Ear: External ear normal.      Nose: Nose normal.   Eyes:      General: No scleral icterus.     Extraocular Movements: Extraocular movements intact.      Conjunctiva/sclera: Conjunctivae normal.   Cardiovascular:      Rate and Rhythm: Normal rate.   Pulmonary:      Effort: Pulmonary effort is normal. No respiratory distress.   Musculoskeletal:      Cervical back: Normal range of motion and neck supple.      Left knee: No effusion.      Comments: See ortho exam   Skin:     General: Skin is warm and dry.   Neurological:      Mental Status: He is alert and oriented to person, place, and time.   Psychiatric:         Mood and Affect: Mood normal.         Behavior: Behavior normal.         I have personally reviewed pertinent films in PACS and my interpretation is as follows:  X-rays of the left shoulder, left knee, and left wrist obtained on 11/7/2023 demonstrate no acute osseous abnormalities.      This document was created using speech voice recognition software.   Grammatical errors, random word insertions, pronoun errors, and incomplete sentences are an occasional consequence of this system due to software limitations, ambient noise, and hardware issues.   Any formal questions or concerns about content, text, or information contained within the body of this dictation should be directly addressed to the provider for clarification.

## 2024-01-08 ENCOUNTER — OFFICE VISIT (OUTPATIENT)
Dept: PHYSICAL THERAPY | Facility: CLINIC | Age: 49
End: 2024-01-08
Payer: COMMERCIAL

## 2024-01-08 DIAGNOSIS — M79.642 LEFT HAND PAIN: ICD-10-CM

## 2024-01-08 DIAGNOSIS — M25.562 LEFT KNEE PAIN, UNSPECIFIED CHRONICITY: Primary | ICD-10-CM

## 2024-01-08 DIAGNOSIS — M25.512 LEFT SHOULDER PAIN, UNSPECIFIED CHRONICITY: ICD-10-CM

## 2024-01-08 PROCEDURE — 97110 THERAPEUTIC EXERCISES: CPT

## 2024-01-08 NOTE — PROGRESS NOTES
Daily Note     Today's date: 2024  Patient name: José Miguel Lazaro  : 1975  MRN: [id number]  Referring provider: Ailyn Callahan DO  Dx:   Encounter Diagnosis     ICD-10-CM    1. Left knee pain, unspecified chronicity  M25.562       2. Left shoulder pain, unspecified chronicity  M25.512       3. Left hand pain  M79.642                      Subjective: Patient reports soreness on the tops of his shoulders.       Objective: See treatment diary below      Assessment: Pt did well with all TE as listed, reports no increased pain during or post tx.       Plan: Continue per plan of care.      Insurance:  AMA/CMS Eval/ Re-eval POC expires FOTO Auth #/ Referral # Total   Visits  Start date  Expiration date Extension  Visit limitation?  PT only or  PT+OT? Co-Insurance   HNJ   Approved  12 24  BOMN  PT $0                                                                 AUTH #:  Date         Visits  Authed: 12 Used 1 1 1 1 1 1 1         Remaining  11 10 9 8 7 6 5             Precautions: standard   Past Medical History:   Diagnosis Date    Anemia     GERD (gastroesophageal reflux disease)          Date 23   Visit Number 2 3 4 5 6 7            Manual            ACJ mobs  gr III and GHJ long axis distraction 2'                        Neuro Re-ed         Serratus push up 2x10 20 3#db 20x  Ball on wall x30 circles each direction Ball on wall x30 circles each direction         Ball on wall  Up/down, L/R, circles x30 each  30 ea  X30 L/R, up/down, circles each  X30 L/R, up/down, circles each  X30 L/R, up/down, circles each    S/L rotation   5x ea side Open books 15x                        TherEx         Pulleys  20 20 20      TB shoulder ext  2x10 BTB 30  BkTB 30 bkTB 2x10 7.5# CC  2x10 BlkTB 2x10 blk TB   TB row  2x10 BTB 30  BkTB 30 bkTB 2x10 7.5# CC 2x10 BlkTB 2x10 Blk TB   TB IR/ER  2x10 BTB each    2x10 each 4#  "CC    90/90 ER 2x10 2.5# 2x10 BlkTB  2x10  Blk TB    No money  2x10 blue loop 20  BTB 20x BTB  2x10 GTB 2x10 GTB   1st rib mob with SOS  2x10 20 10x      Wall slide with lift off 10 blue loop  20 blue loop 10x blue loop 2x10 blue loop  2x10 GTB 2x10 GTB   CC tricep ext 2x10 17.5# 20  17.5# 2x10  17.5#      CC offset push press 2x10 7.5# 20  12.5# 2x10 12.5# 2x10 12.5#     CC D2 flexion 2x10 2.5#  2x10 4# at CC 2x10 4# CC 2x10 BlkTB    Prone ext, row, T 2x10 each  20 ea 3s       Pec stretch    Pec stretch at doorway 6x10\"  10x10s at door 10x10s at door      Pec fly with BkTB 2x10      Wall clocks     X5 B blue loop     TRX pull ups and push ups    2x10 each         Scaption 3# db 2x10 Shoulder flex, abd, scap x10 each 4#    Overhead press 2x10 4#  Shoulder flex, abd, scap x10 each 4# Shoulder flex, abd, scap x10 each 4#   TherAct         Patient education                                             Gait Training                                    Modalities         CP                          "

## 2024-01-11 ENCOUNTER — TELEPHONE (OUTPATIENT)
Dept: RADIOLOGY | Facility: CLINIC | Age: 49
End: 2024-01-11

## 2024-01-11 ENCOUNTER — APPOINTMENT (OUTPATIENT)
Dept: RADIOLOGY | Facility: CLINIC | Age: 49
End: 2024-01-11
Payer: COMMERCIAL

## 2024-01-11 ENCOUNTER — OFFICE VISIT (OUTPATIENT)
Dept: PAIN MEDICINE | Facility: CLINIC | Age: 49
End: 2024-01-11
Payer: COMMERCIAL

## 2024-01-11 VITALS
DIASTOLIC BLOOD PRESSURE: 83 MMHG | WEIGHT: 182 LBS | BODY MASS INDEX: 27.67 KG/M2 | HEART RATE: 74 BPM | SYSTOLIC BLOOD PRESSURE: 155 MMHG

## 2024-01-11 DIAGNOSIS — M54.50 BILATERAL LOW BACK PAIN WITHOUT SCIATICA, UNSPECIFIED CHRONICITY: Chronic | ICD-10-CM

## 2024-01-11 DIAGNOSIS — G89.4 CHRONIC PAIN SYNDROME: Primary | ICD-10-CM

## 2024-01-11 PROCEDURE — 99204 OFFICE O/P NEW MOD 45 MIN: CPT | Performed by: STUDENT IN AN ORGANIZED HEALTH CARE EDUCATION/TRAINING PROGRAM

## 2024-01-11 PROCEDURE — 72100 X-RAY EXAM L-S SPINE 2/3 VWS: CPT

## 2024-01-11 RX ORDER — METHYLPREDNISOLONE 4 MG/1
TABLET ORAL
Qty: 1 EACH | Refills: 0 | Status: SHIPPED | OUTPATIENT
Start: 2024-01-11

## 2024-01-11 RX ORDER — METHOCARBAMOL 500 MG/1
500 TABLET, FILM COATED ORAL 2 TIMES DAILY PRN
Qty: 60 TABLET | Refills: 0 | Status: SHIPPED | OUTPATIENT
Start: 2024-01-11 | End: 2024-02-10

## 2024-01-11 NOTE — PROGRESS NOTES
Assessment:  1. Chronic pain syndrome    2. Bilateral low back pain without sciatica, unspecified chronicity    Patient is a pleasant 48-year-old male who presents as a new patient visit after being referred by Dr. Savage for low back pain.  Patient has been dealing with this pain for months and over the past month the intensity pain has been severe rating the pain as a 10 out of 10 on numeric rating scale.  The pain is occurring constantly, 100% of the time and symptoms are worse in the morning.  He describes the pain as shooting, sharp, pins-and-needles, dull and aching with some associated weakness in the lower extremities.  He does not use any assistive devices.  Activities that increases pain include bending.  Past medical history significant for reflux.  Prior pain treatments include physical therapy for shoulder which provided no relief, heat and ice which provides no relief and acupuncture which provided no relief.  Patient does smoke marijuana and drink alcohol.  Current medications include Lidoderm patches and Voltaren gel.  He is also prescribed Mobic and naproxen.  He has not been trialed on any muscle relaxants or neuropathic agents.      On exam patient with tenderness to palpation in the midline lumbar spine with no tenderness to palpation in the paraspinal muscles.  Patient additionally with positive lumbar facet loading.  Patient symptoms likely secondary to mild lumbar spondylosis.  To further evaluate this we will get an x-ray of the lumbar spine today.  Additionally will refer patient to physical therapy for low back symptoms.  Lastly for symptomatic relief we will prescribe a Medrol Dosepak to hopefully reduce his symptoms in the interim and start methocarbamol 500 mg twice daily as needed.    Plan:  XR lumbar spine  Start robaxin 500 mg BID prn  Continue mobic 15 mg daily prn  Ambulatory referral to PT  Follow up as needed  Orders Placed This Encounter   Procedures   • XR spine lumbar 2 or 3  views injury     Standing Status:   Future     Standing Expiration Date:   1/11/2028     Scheduling Instructions:      Bring along any outside films relating to this procedure.         • Ambulatory referral to Physical Therapy     Standing Status:   Future     Standing Expiration Date:   1/11/2025     Referral Priority:   Routine     Referral Type:   Physical Therapy     Referral Reason:   Specialty Services Required     Requested Specialty:   Physical Therapy     Number of Visits Requested:   1     Expiration Date:   1/11/2025       New Medications Ordered This Visit   Medications   • methocarbamol (ROBAXIN) 500 mg tablet     Sig: Take 1 tablet (500 mg total) by mouth 2 (two) times a day as needed for muscle spasms     Dispense:  60 tablet     Refill:  0   • methylPREDNISolone 4 MG tablet therapy pack     Sig: Use as directed on package     Dispense:  1 each     Refill:  0         My impressions and treatment recommendations were discussed in detail with the patient, who verbalized understanding and had no further questions.      Follow-up is planned in six weeks time or sooner as warranted.  Discharge instructions were provided. I personally saw and examined the patient and I agree with the above discussed plan of care.    History of Present Illness:    José Miguel Lazaro is a 48 y.o. male who presents to [facility] for initial evaluation of the above stated pain complaints. The patient has a past medical and chronic pain history as outlined in the assessment section. He was referred by Jordy Savage MD  [address], [address]  [place] [address].    Patient is a pleasant 48-year-old male who presents as a new patient visit after being referred by Dr. Savage for low back pain.  Patient has been dealing with this pain for months and over the past month the intensity pain has been severe rating the pain as a 10 out of 10 on numeric rating scale.  The  pain is occurring constantly, 100% of the time and symptoms are worse in the morning.  He describes the pain as shooting, sharp, pins-and-needles, dull and aching with some associated weakness in the lower extremities.  He does not use any assistive devices.  Activities that increases pain include bending.  Past medical history significant for reflux.  Prior pain treatments include physical therapy for shoulder which provided no relief, heat and ice which provides no relief and acupuncture which provided no relief.  Patient does smoke marijuana and drink alcohol.  Current medications include Lidoderm patches and Voltaren gel.  He is also prescribed Mobic and naproxen.  He has not been trialed on any muscle relaxants or neuropathic agents.    Review of Systems:    Review of Systems   Constitutional:  Negative for chills, fatigue and unexpected weight change.   HENT:  Negative for ear pain, mouth sores and sinus pressure.    Eyes:  Positive for visual disturbance. Negative for pain and redness.   Respiratory:  Negative for shortness of breath and wheezing.    Cardiovascular:  Negative for chest pain and palpitations.   Gastrointestinal:  Negative for abdominal pain and nausea.   Endocrine: Negative for polyphagia.   Musculoskeletal:  Positive for back pain and gait problem. Negative for arthralgias and neck pain.        Decreased ROM, joint and muscle pain in lower back   Skin:  Negative for wound.   Neurological:  Negative for seizures, weakness and numbness.   Psychiatric/Behavioral:  Positive for sleep disturbance. Negative for decreased concentration and dysphoric mood.            Past Medical History:   Diagnosis Date   • Anemia    • GERD (gastroesophageal reflux disease)        Past Surgical History:   Procedure Laterality Date   • DENTAL SURGERY     • NO PAST SURGERIES     • WISDOM TOOTH EXTRACTION         Family History   Problem Relation Age of Onset   • Diabetes Mother    • Diabetes Father    • Cancer Father     • Diabetes Paternal Aunt        Social History     Occupational History   • Not on file   Tobacco Use   • Smoking status: Every Day     Types: E-Cigarettes   • Smokeless tobacco: Never   • Tobacco comments:     vapes, used to smoke cigarretes 0.25 ppd for 15-20 years   Vaping Use   • Vaping status: Every Day   • Substances: Nicotine, CBD   Substance and Sexual Activity   • Alcohol use: Yes     Alcohol/week: 7.0 standard drinks of alcohol     Types: 7 Standard drinks or equivalent per week     Comment: varies. maybe five shooters a day, last drink couple days ago   • Drug use: Not Currently   • Sexual activity: Yes     Partners: Female     Birth control/protection: None     Comment: Wife         Current Outpatient Medications:   •  acetaminophen (TYLENOL) 325 mg tablet, Take 650 mg by mouth every 6 (six) hours as needed for mild pain, Disp: , Rfl:   •  Biotin 1 MG CAPS, Take by mouth, Disp: , Rfl:   •  Diclofenac Sodium (VOLTAREN) 1 %, Apply 2 g topically 4 (four) times a day To affected area, Disp: 100 g, Rfl: 1  •  lidocaine (Lidoderm) 5 %, Apply 1 patch topically daily Remove & Discard patch within 12 hours or as directed by MD (Patient taking differently: Apply 1 patch topically if needed Remove & Discard patch within 12 hours or as directed by MD), Disp: 15 patch, Rfl: 0  •  meloxicam (Mobic) 15 mg tablet, Take 1 tablet (15 mg total) by mouth daily, Disp: 30 tablet, Rfl: 0  •  methocarbamol (ROBAXIN) 500 mg tablet, Take 1 tablet (500 mg total) by mouth 2 (two) times a day as needed for muscle spasms, Disp: 60 tablet, Rfl: 0  •  methylPREDNISolone 4 MG tablet therapy pack, Use as directed on package, Disp: 1 each, Rfl: 0  •  Multiple Vitamin (multivitamin) tablet, Take 1 tablet by mouth daily, Disp: , Rfl:   •  Nutritional Supplements (VITAMIN D BOOSTER PO), Take by mouth in the morning, Disp: , Rfl:   •  Omega-3 Fatty Acids (FISH OIL CONCENTRATE PO), Take by mouth in the morning, Disp: , Rfl:   •   atorvastatin (LIPITOR) 20 mg tablet, Take 1 tablet (20 mg total) by mouth daily, Disp: 30 tablet, Rfl: 0  •  ciprofloxacin-dexamethasone (CIPRODEX) otic suspension, Administer 4 drops into both ears 2 (two) times a day for 7 days, Disp: 7.5 mL, Rfl: 0  •  famotidine (PEPCID) 20 mg tablet, Take 1 tablet (20 mg total) by mouth daily (Patient not taking: Reported on 1/11/2024), Disp: 30 tablet, Rfl: 0  •  naproxen (Naprosyn) 500 mg tablet, Take 1 tablet (500 mg total) by mouth 2 (two) times a day with meals for 14 days (Patient not taking: Reported on 11/7/2023), Disp: 28 tablet, Rfl: 0  •  valACYclovir (VALTREX) 1,000 mg tablet, Take 2 tablets (2,000 mg total) by mouth 2 (two) times a day for 1 day, Disp: 4 tablet, Rfl: 0    No Known Allergies    Physical Exam:    /83   Pulse 74   Wt 82.6 kg (182 lb)   BMI 27.67 kg/m²     Constitutional: normal, well developed, well nourished, alert, in no distress and non-toxic and no overt pain behavior.  Eyes: anicteric  HEENT: grossly intact  Neck: supple, symmetric, trachea midline and no masses   Pulmonary:even and unlabored  Cardiovascular:No edema or pitting edema present  Skin:Normal without rashes or lesions and well hydrated  Psychiatric:Mood and affect appropriate  Neurologic:Cranial Nerves II-XII grossly intact  Musculoskeletal:normal gait. TTP in midline lumbar spine. +Lumbar facet loading.     Imaging  XR spine lumbar 2 or 3 views injury    (Results Pending)       Orders Placed This Encounter   Procedures   • XR spine lumbar 2 or 3 views injury   • Ambulatory referral to Physical Therapy

## 2024-01-11 NOTE — TELEPHONE ENCOUNTER
----- Message from Preet Mcmahon MD sent at 1/11/2024  3:04 PM EST -----  Xray of lumbar spine normal. No change in management.

## 2024-01-15 ENCOUNTER — OFFICE VISIT (OUTPATIENT)
Dept: PHYSICAL THERAPY | Facility: CLINIC | Age: 49
End: 2024-01-15
Payer: COMMERCIAL

## 2024-01-15 DIAGNOSIS — M79.642 LEFT HAND PAIN: ICD-10-CM

## 2024-01-15 DIAGNOSIS — M25.512 LEFT SHOULDER PAIN, UNSPECIFIED CHRONICITY: ICD-10-CM

## 2024-01-15 DIAGNOSIS — M25.562 LEFT KNEE PAIN, UNSPECIFIED CHRONICITY: Primary | ICD-10-CM

## 2024-01-15 PROCEDURE — 97110 THERAPEUTIC EXERCISES: CPT

## 2024-01-15 NOTE — PROGRESS NOTES
Daily Note     Today's date: 1/15/2024  Patient name: José Miguel Lazaro  : 1975  MRN: [id number]  Referring provider: Ailyn Callahan DO  Dx:   Encounter Diagnosis     ICD-10-CM    1. Left knee pain, unspecified chronicity  M25.562       2. Left shoulder pain, unspecified chronicity  M25.512       3. Left hand pain  M79.642                      Subjective: Patient reports no new complaints at this time.       Objective: See treatment diary below      Assessment: Pt did well with all TE as listed, no increased pain during or post tx.       Plan: Continue per plan of care.      Insurance:  AMA/CMS Eval/ Re-eval POC expires FOTO Auth #/ Referral # Total   Visits  Start date  Expiration date Extension  Visit limitation?  PT only or  PT+OT? Co-Insurance   [facility]   Approved  24  BOMN  PT $0                                                                 AUTH #:  Date 11/7 11/14 11/21 11/30 12/18 12/21 1/8 1/15       Visits  Authed: 12 Used 1 1 1 1 1 1 1 1        Remaining  11 10 9 8 7 6 5 4            Precautions: standard   Past Medical History:   Diagnosis Date    Anemia     GERD (gastroesophageal reflux disease)          Date 11/21/23 11/30 12/18/23 12/21/23 1/8/24 1/15/24   Visit Number 3 4 5 6 7 8            Manual           ACJ mobs  gr III and GHJ long axis distraction 2'                         Neuro Re-ed         Serratus push up 20 3#db 20x  Ball on wall x30 circles each direction Ball on wall x30 circles each direction       Ball on wall x30 circles each direction   Ball on wall  30 ea  X30 L/R, up/down, circles each  X30 L/R, up/down, circles each  X30 L/R, up/down, circles each  X30 L/R, up/down, circles each    S/L rotation  5x ea side Open books 15x                         TherEx         Pulleys  20 20       TB shoulder ext  30  BkTB 30 bkTB 2x10 7.5# CC  2x10 BlkTB 2x10 blk TB 3x10 blk TB   TB row  30  BkTB 30 bkTB 2x10 7.5# CC 2x10 BlkTB 2x10 Blk TB 3x10 blk TB   TB IR/ER    2x10  "each 4# CC    90/90 ER 2x10 2.5# 2x10 BlkTB  2x10  Blk TB  2x10 blk TB   No money  20  BTB 20x BTB  2x10 GTB 2x10 GTB 2x10 GTB   1st rib mob with SOS  20 10x       Wall slide with lift off 20 blue loop 10x blue loop 2x10 blue loop  2x10 GTB 2x10 GTB 20x10 GTB   CC tricep ext 20  17.5# 2x10  17.5#       CC offset push press 20  12.5# 2x10 12.5# 2x10 12.5#      CC D2 flexion  2x10 4# at CC 2x10 4# CC 2x10 BlkTB  2x10 Blk TB   Prone ext, row, T 20 ea 3s        Pec stretch   Pec stretch at doorway 6x10\"  10x10s at door 10x10s at door 10\"x10 at door     Pec fly with BkTB 2x10       Wall clocks    X5 B blue loop      TRX pull ups and push ups   2x10 each         Scaption 3# db 2x10 Shoulder flex, abd, scap x10 each 4#    Overhead press 2x10 4#  Shoulder flex, abd, scap x10 each 4# Shoulder flex, abd, scap x10 each 4# Shoulder flex, abd, scap 2x10 each 4#   TherAct         Patient education                                             Gait Training                                    Modalities         CP                            "

## 2024-01-18 ENCOUNTER — EVALUATION (OUTPATIENT)
Dept: PHYSICAL THERAPY | Facility: CLINIC | Age: 49
End: 2024-01-18
Payer: COMMERCIAL

## 2024-01-18 DIAGNOSIS — M79.642 LEFT HAND PAIN: ICD-10-CM

## 2024-01-18 DIAGNOSIS — M54.16 LUMBAR RADICULOPATHY: ICD-10-CM

## 2024-01-18 DIAGNOSIS — M25.562 LEFT KNEE PAIN, UNSPECIFIED CHRONICITY: Primary | ICD-10-CM

## 2024-01-18 DIAGNOSIS — M25.512 LEFT SHOULDER PAIN, UNSPECIFIED CHRONICITY: ICD-10-CM

## 2024-01-18 PROCEDURE — 97110 THERAPEUTIC EXERCISES: CPT

## 2024-01-18 NOTE — PROGRESS NOTES
PT Re-Evaluation   Today's date: 2024  Patient name: José Miguel Lazaro  : 1975  MRN: [id number]  Referring provider: Ailyn Callahan DO  Dx:   Encounter Diagnosis     ICD-10-CM    1. Left knee pain, unspecified chronicity  M25.562       2. Left shoulder pain, unspecified chronicity  M25.512       3. Left hand pain  M79.642       4. Lumbar radiculopathy  M54.16               Assessment  Assessment details: Patient is a 48 y.o. Male who presents to skilled outpatient PT for referring diagnoses include left shoulder pain, left knee pain, left hand pain, and lumbar radiculopathy. Patient demonstrates improvements in L shoulder ROM and strength since starting PT intervention. He continues to struggle with endurance and power of his L UE due to continued weakness. Added lumbar spine today, with limitations in ROM and core strength. Poor lifting mechanics demonstrated today. Patient would continue to benefit from skilled PT intervention to maximize overall function and return to work. Continue to progress as tolerated.       Impairments: Abnormal muscle tone, Abnormal or restricted ROM, Impaired physical strength, Poor posture, and Pain with function  Understanding of Dx/Px/POC: Excellent  Prognosis: Excellent    Patient verbalized understanding of POC. Please contact me if you have any questions or recommendations. Thank you for the referral and the opportunity to share in José Miguel Lazaro's care.    Plan  Plan details: See below.     Patient would benefit from: PT Eval and Skilled PT  Planned modality interventions: Dry needling, Biofeedback, Cryotherapy, TENS, Thermotherapy: Hydrocollator Packs, and Traction  Planned therapy interventions: Abdominal trunk stabilization, ADL training, Balance, Balance/WB training, Functional ROM exercises, Gait training, HEP, Joint mobilization, Manual therapy, Hewitt taping, Neuromuscular re-education, Patient education, Postural training, Strengthening, Stretching,  "Therapeutic activities, Therapeutic exercises, Therapeutic training, Transfer training, and Activity modification  Frequency: 2x/wk  Duration in weeks: 8  Plan of Care beginning date: 1/18/24  Plan of Care expiration date: 8 weeks - 3/14/2024  Treatment plan discussed with: Patient       Goals  Short Term Goals (4 weeks):  MET as of 1/18/24  - Patient will be independent in basic HEP 2-3 weeks  - Patient will have <4/10 pain when performing work related tasks.   - Patient will demonstrate >1/3 improvement in MMT grade as applicable    Long Term Goals (8 weeks): Progressing towards as of 1/18/24   - Patient will be independent in a comprehensive home exercise program  - Patient FOTO score will improve by 10 points.   - Patient will self-report >75% improvement in function  - Patient functional goal: [place] will drive with no increased pain.     Updated goals as of 1/18/24:   -     Subjective    History of Present Illness  - Mechanism of injury: Patient reports L shoulder pain that is caused by lifting objects of weight. The surgeon believes that his shoulder is bothering him due to sleeping position. She is having increased pain noted as sharp/shooting. Denies radicular symptoms, such as numbness/tingling. Patient works in delivery and installation of appliances.     - Updated subjective as of 1/18/24: Patient wants to add his back into the plan of care today. He reports his back pain comes and goes. He has a sharp pain on the right side of his back. He has increased pain when he is sitting for long periods of time and driving. Pain does not wake him up in the middle of the night. He also reports his shoulder is \"alright\" and the exercises are helping. He pushed on a machine and didn't have any increased pain as a result.     - Functional limitations: lifting objects of weight, reaching behind back, driving long distances        - Patient goals: \"Workout and lift weights.\"         Pain  - Current pain rating: " 0/10  - At best pain ratin/10  - At worst pain rating: 10/10  - Location: L shoulder   - Alleviating factors: none    - Current pain ratin/10  - At best pain ratin/10  - At worst pain rating: 10/10  - Location: L shoulder   - Alleviating factors: none      Objective   UE MMT  L Shoulder Flexion: 4/5 R Shoulder Flexion: 5/5   L Shoulder Extension: 4/5 R Shoulder Extension: 5/5   L Shoulder Abduction: 4/5 R Shoulder Abduction: 5/5   L Shoulder  IR: 4/5 R Shoulder  IR: 5/5   L Shoulder  ER: 4/5 R Shoulder  ER: 5/5   L Elbow Extension: 5/5 R Elbow Extension: 5/5   L Elbow Flexion: 5/5 R Elbow Flexion: 5/5   L Wrist Flexion: 5/5 R Wrist Flexion: 5/5   L Wrist Extension: 5/5 R Wrist Extension: 5/5         Cervical AROM    Flexion 100%   Extension 100%   Side bending R 100%   Side bending L  100%   Rotation R 100%   Rotation L 100%     Repeated motion assessment    Repeated retraction NE   Repeated protraction NE   Repeated retraction extension  NE   Repeated extension  NT   Repeated L lateral flexion  NT   Repeated R lateral flexion  NT   Sustained protraction NT     Deep neck flexor endurance: poor        Sensation  - Light touch: intact       Special Testing L R   Compression negative negative   Distraction negative negative   Cervical rotation lateral flexion positive negative   C1 fracture  negative negative   Alar ligament negative negative   Vertebral artery (VBI)  negative negative   Sharp Gerda  negative negative   Neurodynamic assessment  NT NT     LE MMT    L Hip Flexion: 5/5  R Hip Flexion: 5/5   L Hip Extension: 5/5 R Hip Extension: 5/5   L Hip Abduction: 4/5 R Hip Abduction: 4/5   L Hip Adduction: 5/5 R Hip Adduction: 5/5   L Knee Extension: 5/5 R Knee Extension: 5/5   L Knee Flexion: 5/5 R Knee Flexion: 5/5   L Ankle DF: 5/5 R Ankle DF: 5/5   L Ankle PF: 5/5  R Ankle PF: 5/5   L Ankle IV: 5/5  L Ankle IV: 5/5   L Ankle EV: 5/5  L Ankle EV: 5/5         Movement Loss Rodrick Mod Min Nil Symptoms    Flexion    x    Extension   x     Side gliding R    x    Side gliding L    x    Other: rotation    x       Symptom response Mechanical Response    During testing After testing Effect (?? ROM or key functional test) No effect   Pretest symptoms in standing       FIS Increased  Worse      RFIS Increased  Worse      EIS Decreased  Better      KALEB Decreased  Better             Pretest symptoms lying       LICHA       RFIL       EIL Decreased  Better      REIL Decreased  Better             Pretest symptoms       R SGIS       R RSGIS       L SGIS       L RSGIS              Other movements               Special Testing L R   FABIR negative negative   FADIR negative negative   Hip Scour  negative negative   Thigh thrust negative negative   Flick test  negative negative   Standing flexion test  negative negative   Prone knee flexion test negative negative   Supine to long sit test negative negative   Neurodynamic assessment  NT NT              Insurance:  AMA/CMS Eval/ Re-eval POC expires FOTO Auth #/ Referral # Total   Visits  Start date  Expiration date Extension  Visit limitation?  PT only or  PT+OT? Co-Insurance   [facility] 11/7 1/2  Approved  12 11/7 2/7/24  BOMN  PT $0                                                                 AUTH #:  Date 11/7 11/14 11/21 11/30 12/18 12/21 1/8 1/15 1/18      Visits  Authed: 12 Used 1 1 1 1 1 1 1 1 1       Remaining  11 10 9 8 7 6 5 4 3           Precautions: standard   Past Medical History:   Diagnosis Date    Anemia     GERD (gastroesophageal reflux disease)          Date 11/21/23 11/30 12/18/23 12/21/23 1/8/24 1/15/24 1/18/24   Visit Number 3 4 5 6 7 8 9             Manual            ACJ mobs  gr III and GHJ long axis distraction 2'                            Neuro Re-ed          Serratus push up 20 3#db 20x  Ball on wall x30 circles each direction Ball on wall x30 circles each direction       Ball on wall x30 circles each direction    Ball on wall  30 ea  X30 L/R, up/down,  "circles each  X30 L/R, up/down, circles each  X30 L/R, up/down, circles each  X30 L/R, up/down, circles each     S/L rotation  5x ea side Open books 15x               X band walk 4x10' green looped band             TherEx          Pulleys  20 20        TB shoulder ext  30  BkTB 30 bkTB 2x10 7.5# CC  2x10 BlkTB 2x10 blk TB 3x10 blk TB 3x10 blk TB   TB row  30  BkTB 30 bkTB 2x10 7.5# CC 2x10 BlkTB 2x10 Blk TB 3x10 blk TB 3x10 blk TB   TB IR/ER    2x10 each 4# CC    90/90 ER 2x10 2.5# 2x10 BlkTB  2x10  Blk TB  2x10 blk TB 2x10 blk TB   No money  20  BTB 20x BTB  2x10 GTB 2x10 GTB 2x10 GTB    1st rib mob with SOS  20 10x        Wall slide with lift off 20 blue loop 10x blue loop 2x10 blue loop  2x10 GTB 2x10 GTB 20x10 GTB 20x10 GTB   CC tricep ext 20  17.5# 2x10  17.5#     Paloff press x10 B BTB   CC offset push press 20  12.5# 2x10 12.5# 2x10 12.5#       CC D2 flexion  2x10 4# at CC 2x10 4# CC 2x10 BlkTB  2x10 Blk TB Split stance D1 flex x10 B BTB   Prone ext, row, T 20 ea 3s         Pec stretch   Pec stretch at doorway 6x10\"  10x10s at door 10x10s at door 10\"x10 at door      Pec fly with BkTB 2x10        Wall clocks    X5 B blue loop       TRX pull ups and push ups   2x10 each          Scaption 3# db 2x10 Shoulder flex, abd, scap x10 each 4#    Overhead press 2x10 4#  Shoulder flex, abd, scap x10 each 4# Shoulder flex, abd, scap x10 each 4# Shoulder flex, abd, scap 2x10 each 4#    TherAct          Patient education                                                  Gait Training                                        Modalities          CP                  "

## 2024-01-18 NOTE — LETTER
2024    Ailyn Callahan DO  [address]  [address]  [place] [address]    Patient: José Miguel Lazaro   YOB: 1975   Date of Visit: 2024     Encounter Diagnosis     ICD-10-CM    1. Left knee pain, unspecified chronicity  M25.562       2. Left shoulder pain, unspecified chronicity  M25.512       3. Left hand pain  M79.642       4. Lumbar radiculopathy  M54.16           Dear Dr. Callahan:    Thank you for your recent referral of José Miguel Lazaro. Please review the attached evaluation summary from José Miguel's recent visit.     Please verify that you agree with the plan of care by signing the attached order.     If you have any questions or concerns, please do not hesitate to call.     I sincerely appreciate the opportunity to share in the care of one of your patients and hope to have another opportunity to work with you in the near future.       Sincerely,    Meryl Hilario, PT      Referring Provider:      I certify that I have read the below Plan of Care and certify the need for these services furnished under this plan of treatment while under my care.                    Ailyn Callahan DO  [address]  [address]  [place] [address]  Via In Basket            PT Re-Evaluation   Today's date: 2024  Patient name: José Miguel Lazaro  : 1975  MRN: [id number]  Referring provider: Ailyn Callahan DO  Dx:   Encounter Diagnosis     ICD-10-CM    1. Left knee pain, unspecified chronicity  M25.562       2. Left shoulder pain, unspecified chronicity  M25.512       3. Left hand pain  M79.642       4. Lumbar radiculopathy  M54.16               Assessment  Assessment details: Patient is a 48 y.o. Male who presents to skilled outpatient PT for referring diagnoses include left shoulder pain, left knee pain, left hand pain, and lumbar radiculopathy. Patient demonstrates improvements in L shoulder ROM and strength since starting PT intervention. He continues to struggle with  endurance and power of his L UE due to continued weakness. Added lumbar spine today, with limitations in ROM and core strength. Poor lifting mechanics demonstrated today. Patient would continue to benefit from skilled PT intervention to maximize overall function and return to work. Continue to progress as tolerated.       Impairments: Abnormal muscle tone, Abnormal or restricted ROM, Impaired physical strength, Poor posture, and Pain with function  Understanding of Dx/Px/POC: Excellent  Prognosis: Excellent    Patient verbalized understanding of POC. Please contact me if you have any questions or recommendations. Thank you for the referral and the opportunity to share in José Miguel Lazaro's care.    Plan  Plan details: See below.     Patient would benefit from: PT Eval and Skilled PT  Planned modality interventions: Dry needling, Biofeedback, Cryotherapy, TENS, Thermotherapy: Hydrocollator Packs, and Traction  Planned therapy interventions: Abdominal trunk stabilization, ADL training, Balance, Balance/WB training, Functional ROM exercises, Gait training, HEP, Joint mobilization, Manual therapy, Hewitt taping, Neuromuscular re-education, Patient education, Postural training, Strengthening, Stretching, Therapeutic activities, Therapeutic exercises, Therapeutic training, Transfer training, and Activity modification  Frequency: 2x/wk  Duration in weeks: 8  Plan of Care beginning date: 1/18/24  Plan of Care expiration date: 8 weeks - 3/14/2024  Treatment plan discussed with: Patient       Goals  Short Term Goals (4 weeks):  MET as of 1/18/24  - Patient will be independent in basic HEP 2-3 weeks  - Patient will have <4/10 pain when performing work related tasks.   - Patient will demonstrate >1/3 improvement in MMT grade as applicable    Long Term Goals (8 weeks): Progressing towards as of 1/18/24   - Patient will be independent in a comprehensive home exercise program  - Patient FOTO score will improve by 10 points.   -  "Patient will self-report >75% improvement in function  - Patient functional goal: [place] will drive with no increased pain.     Updated goals as of 24:   -     Subjective    History of Present Illness  - Mechanism of injury: Patient reports L shoulder pain that is caused by lifting objects of weight. The surgeon believes that his shoulder is bothering him due to sleeping position. She is having increased pain noted as sharp/shooting. Denies radicular symptoms, such as numbness/tingling. Patient works in delivery and installation of appliances.     - Updated subjective as of 24: Patient wants to add his back into the plan of care today. He reports his back pain comes and goes. He has a sharp pain on the right side of his back. He has increased pain when he is sitting for long periods of time and driving. Pain does not wake him up in the middle of the night. He also reports his shoulder is \"alright\" and the exercises are helping. He pushed on a machine and didn't have any increased pain as a result.     - Functional limitations: lifting objects of weight, reaching behind back, driving long distances        - Patient goals: \"Workout and lift weights.\"         Pain  - Current pain ratin/10  - At best pain ratin/10  - At worst pain rating: 10/10  - Location: L shoulder   - Alleviating factors: none    - Current pain ratin/10  - At best pain ratin/10  - At worst pain rating: 10/10  - Location: L shoulder   - Alleviating factors: none      Objective   UE MMT  L Shoulder Flexion: 4/5 R Shoulder Flexion: 5/5   L Shoulder Extension: 4/5 R Shoulder Extension: 5/5   L Shoulder Abduction: 4/5 R Shoulder Abduction: 5/5   L Shoulder  IR: 4/5 R Shoulder  IR: 5/5   L Shoulder  ER: 4/5 R Shoulder  ER: 5/5   L Elbow Extension: 5/5 R Elbow Extension: 5/5   L Elbow Flexion: 5/5 R Elbow Flexion: 5/5   L Wrist Flexion: 5/5 R Wrist Flexion: 5/5   L Wrist Extension: 5/5 R Wrist Extension: 5/5         Cervical AROM "    Flexion 100%   Extension 100%   Side bending R 100%   Side bending L  100%   Rotation R 100%   Rotation L 100%     Repeated motion assessment    Repeated retraction NE   Repeated protraction NE   Repeated retraction extension  NE   Repeated extension  NT   Repeated L lateral flexion  NT   Repeated R lateral flexion  NT   Sustained protraction NT     Deep neck flexor endurance: poor        Sensation  - Light touch: intact       Special Testing L R   Compression negative negative   Distraction negative negative   Cervical rotation lateral flexion positive negative   C1 fracture  negative negative   Alar ligament negative negative   Vertebral artery (VBI)  negative negative   Sharp Gerda  negative negative   Neurodynamic assessment  NT NT     LE MMT    L Hip Flexion: 5/5  R Hip Flexion: 5/5   L Hip Extension: 5/5 R Hip Extension: 5/5   L Hip Abduction: 4/5 R Hip Abduction: 4/5   L Hip Adduction: 5/5 R Hip Adduction: 5/5   L Knee Extension: 5/5 R Knee Extension: 5/5   L Knee Flexion: 5/5 R Knee Flexion: 5/5   L Ankle DF: 5/5 R Ankle DF: 5/5   L Ankle PF: 5/5  R Ankle PF: 5/5   L Ankle IV: 5/5  L Ankle IV: 5/5   L Ankle EV: 5/5  L Ankle EV: 5/5         Movement Loss Rodrick Mod Min Nil Symptoms   Flexion    x    Extension   x     Side gliding R    x    Side gliding L    x    Other: rotation    x       Symptom response Mechanical Response    During testing After testing Effect (?? ROM or key functional test) No effect   Pretest symptoms in standing       FIS Increased  Worse      RFIS Increased  Worse      EIS Decreased  Better      KALEB Decreased  Better             Pretest symptoms lying       LICHA       RFIL       EIL Decreased  Better      REIL Decreased  Better             Pretest symptoms       R SGIS       R RSGIS       L SGIS       L RSGIS              Other movements               Special Testing L R   FABIR negative negative   FADIR negative negative   Hip Scour  negative negative   Thigh thrust negative negative    Flick test  negative negative   Standing flexion test  negative negative   Prone knee flexion test negative negative   Supine to long sit test negative negative   Neurodynamic assessment  NT NT              Insurance:  AMA/CMS Eval/ Re-eval POC expires FOTO Auth #/ Referral # Total   Visits  Start date  Expiration date Extension  Visit limitation?  PT only or  PT+OT? Co-Insurance   [facility] 11/7 1/2  Approved  12 11/7 2/7/24  BOMN  PT $0                                                                 AUTH #:  Date 11/7 11/14 11/21 11/30 12/18 12/21 1/8 1/15 1/18      Visits  Authed: 12 Used 1 1 1 1 1 1 1 1 1       Remaining  11 10 9 8 7 6 5 4 3           Precautions: standard   Past Medical History:   Diagnosis Date   • Anemia    • GERD (gastroesophageal reflux disease)          Date 11/21/23 11/30 12/18/23 12/21/23 1/8/24 1/15/24 1/18/24   Visit Number 3 4 5 6 7 8 9             Manual            ACJ mobs  gr III and GHJ long axis distraction 2'                            Neuro Re-ed          Serratus push up 20 3#db 20x  Ball on wall x30 circles each direction Ball on wall x30 circles each direction       Ball on wall x30 circles each direction    Ball on wall  30 ea  X30 L/R, up/down, circles each  X30 L/R, up/down, circles each  X30 L/R, up/down, circles each  X30 L/R, up/down, circles each     S/L rotation  5x ea side Open books 15x               X band walk 4x10' green looped band             TherEx          Pulleys  20 20        TB shoulder ext  30  BkTB 30 bkTB 2x10 7.5# CC  2x10 BlkTB 2x10 blk TB 3x10 blk TB 3x10 blk TB   TB row  30  BkTB 30 bkTB 2x10 7.5# CC 2x10 BlkTB 2x10 Blk TB 3x10 blk TB 3x10 blk TB   TB IR/ER    2x10 each 4# CC    90/90 ER 2x10 2.5# 2x10 BlkTB  2x10  Blk TB  2x10 blk TB 2x10 blk TB   No money  20  BTB 20x BTB  2x10 GTB 2x10 GTB 2x10 GTB    1st rib mob with SOS  20 10x        Wall slide with lift off 20 blue loop 10x blue loop 2x10 blue loop  2x10 GTB 2x10 GTB 20x10 GTB 20x10 GTB   CC  "tricep ext 20  17.5# 2x10  17.5#     Paloff press x10 B BTB   CC offset push press 20  12.5# 2x10 12.5# 2x10 12.5#       CC D2 flexion  2x10 4# at CC 2x10 4# CC 2x10 BlkTB  2x10 Blk TB Split stance D1 flex x10 B BTB   Prone ext, row, T 20 ea 3s         Pec stretch   Pec stretch at doorway 6x10\"  10x10s at door 10x10s at door 10\"x10 at door      Pec fly with BkTB 2x10        Wall clocks    X5 B blue loop       TRX pull ups and push ups   2x10 each          Scaption 3# db 2x10 Shoulder flex, abd, scap x10 each 4#    Overhead press 2x10 4#  Shoulder flex, abd, scap x10 each 4# Shoulder flex, abd, scap x10 each 4# Shoulder flex, abd, scap 2x10 each 4#    TherAct          Patient education                                                  Gait Training                                        Modalities          CP                                  "

## 2024-01-22 ENCOUNTER — OFFICE VISIT (OUTPATIENT)
Dept: PHYSICAL THERAPY | Facility: CLINIC | Age: 49
End: 2024-01-22
Payer: COMMERCIAL

## 2024-01-22 DIAGNOSIS — M79.642 LEFT HAND PAIN: ICD-10-CM

## 2024-01-22 DIAGNOSIS — M25.562 LEFT KNEE PAIN, UNSPECIFIED CHRONICITY: Primary | ICD-10-CM

## 2024-01-22 DIAGNOSIS — M54.16 LUMBAR RADICULOPATHY: ICD-10-CM

## 2024-01-22 DIAGNOSIS — M25.512 LEFT SHOULDER PAIN, UNSPECIFIED CHRONICITY: ICD-10-CM

## 2024-01-22 PROCEDURE — 97112 NEUROMUSCULAR REEDUCATION: CPT

## 2024-01-22 PROCEDURE — 97110 THERAPEUTIC EXERCISES: CPT

## 2024-01-22 NOTE — PROGRESS NOTES
Daily Note     Today's date: 2024  Patient name: José Miguel Lazaro  : 1975  MRN: [id number]  Referring provider: Ailyn Callahan DO  Dx:   Encounter Diagnosis     ICD-10-CM    1. Left knee pain, unspecified chronicity  M25.562       2. Left shoulder pain, unspecified chronicity  M25.512       3. Left hand pain  M79.642       4. Lumbar radiculopathy  M54.16                        Subjective: Patient reports today was the first day he's woken up less stiff in his back.       Objective: See treatment diary below      Assessment: Pt did well with new Te added for his back, reports no increased pain during or post tx      Plan: Continue per plan of care.        Insurance:  AMA/CMS Eval/ Re-eval POC expires FOTO Auth #/ Referral # Total   Visits  Start date  Expiration date Extension  Visit limitation?  PT only or  PT+OT? Co-Insurance   [facility]   Approved  12 24  BOMN  PT $0                                                                 AUTH #:  Date 11/7 11/14 11/21 11/30 12/18 12/21 1/8 1/15 1/18 1/22     Visits  Authed: 12 Used 1 1 1 1 1 1 1 1 1 1      Remaining  11 10 9 8 7 6 5 4 3 2          Precautions: standard   Past Medical History:   Diagnosis Date    Anemia     GERD (gastroesophageal reflux disease)          Date 1/8/24 1/15/24 1/18/24 1/22/24    Visit Number 7 8 9 10            Manual                                Neuro Re-ed        Serratus push up Ball on wall x30 circles each direction       Ball on wall x30 circles each direction      Ball on wall  X30 L/R, up/down, circles each  X30 L/R, up/down, circles each       S/L rotation            X band walk 4x10' green looped band     Prone prop    2'x2    PPU    2x10    Open books    X10 ea            TherEx        Pulleys         TB shoulder ext  2x10 blk TB 3x10 blk TB 3x10 blk TB 3x10 blk TB    TB row  2x10 Blk TB 3x10 blk TB 3x10 blk TB 3x10 blk TB    TB IR/ER  2x10  Blk TB  2x10 blk TB 2x10 blk TB 2x10 blk TB    No money  2x10 GTB  "2x10 GTB      1st rib mob with SOS         Wall slide with lift off 2x10 GTB 20x10 GTB 20x10 GTB 20x10 GTB    Palloff press   Paloff press x10 B BTB BTB 3\"x20 ea    CC offset push press        CC D2 flexion  2x10 Blk TB Split stance D1 flex x10 B BTB Split stance D1 flex x10 B BTB    Prone ext, row, T        Pec stretch  10x10s at door 10\"x10 at door      bridges    5\"x20    clamshells    X10 ea            Wall clocks         TRX pull ups and push ups         Shoulder flex, abd, scap x10 each 4# Shoulder flex, abd, scap 2x10 each 4#  Shoulder flex, abd, scap 2x10 each 4#    TherAct        Patient education                                        Gait Training                                Modalities        CP                         "

## 2024-02-05 ENCOUNTER — OFFICE VISIT (OUTPATIENT)
Dept: PHYSICAL THERAPY | Facility: CLINIC | Age: 49
End: 2024-02-05
Payer: COMMERCIAL

## 2024-02-05 DIAGNOSIS — M54.16 LUMBAR RADICULOPATHY: ICD-10-CM

## 2024-02-05 DIAGNOSIS — M25.562 LEFT KNEE PAIN, UNSPECIFIED CHRONICITY: Primary | ICD-10-CM

## 2024-02-05 DIAGNOSIS — M79.642 LEFT HAND PAIN: ICD-10-CM

## 2024-02-05 DIAGNOSIS — M25.512 LEFT SHOULDER PAIN, UNSPECIFIED CHRONICITY: ICD-10-CM

## 2024-02-05 PROCEDURE — 97110 THERAPEUTIC EXERCISES: CPT

## 2024-02-05 PROCEDURE — 97112 NEUROMUSCULAR REEDUCATION: CPT

## 2024-02-05 NOTE — PROGRESS NOTES
Daily Note     Today's date: 2024  Patient name: José Migule Lazaro  : 1975  MRN: [id number]  Referring provider: Ailyn Callahan DO  Dx:   Encounter Diagnosis     ICD-10-CM    1. Left knee pain, unspecified chronicity  M25.562       2. Left shoulder pain, unspecified chronicity  M25.512       3. Left hand pain  M79.642       4. Lumbar radiculopathy  M54.16               Start Time: 0800  Stop Time: 0840  Total time in clinic (min): 40 minutes    Subjective: Patient reports his shoulder is feeling much better, but continues to have increased pain of his L knee and back when he is bending. He has troubles getting out of the bed due to stiffness of his back.        Objective: See treatment diary below      Assessment: Patient reports increased fatigue post tx today. Reviewed lift mechanics today, as patient continues to note increased pain when he is performing work related activities. Will continue to progress as tolerated.       Plan: Continue per plan of care.        Insurance:  AMA/CMS Eval/ Re-eval POC expires FOTO Auth #/ Referral # Total   Visits  Start date  Expiration date Extension  Visit limitation?  PT only or  PT+OT? Co-Insurance   [facility]   Approved  24  BOMN  PT $0                                                                 AUTH #:  Date 11/7 11/14 11/21 11/30 12/18 12/21 1/8 1/15 1/18 1/22 2/5/24    Visits  Authed: 12 Used 1 1 1 1 1 1 1 1 1 1 1     Remaining  11 10 9 8 7 6 5 4 3 2 1         Precautions: standard   Past Medical History:   Diagnosis Date    Anemia     GERD (gastroesophageal reflux disease)          Date 1/8/24 1/15/24 1/18/24 1/22/24 2/5/24   Visit Number 7 8 9 10 11           Manual                                Neuro Re-ed        Serratus push up Ball on wall x30 circles each direction       Ball on wall x30 circles each direction      Ball on wall  X30 L/R, up/down, circles each  X30 L/R, up/down, circles each    TRX squats 2x10    S/L rotation      Quad  "sets 3sx20      X band walk 4x10' green looped band  SLR 2x10    Prone prop    2'x2 REIL x10     REIL with self OP x10    PPU    2x10 Hip burners x15 B    Open books    X10 ea            TherEx        Pulleys         TB shoulder ext  2x10 blk TB 3x10 blk TB 3x10 blk TB 3x10 blk TB    TB row  2x10 Blk TB 3x10 blk TB 3x10 blk TB 3x10 blk TB    TB IR/ER  2x10  Blk TB  2x10 blk TB 2x10 blk TB 2x10 blk TB    No money  2x10 GTB 2x10 GTB      1st rib mob with SOS      D2 chops 15 B 7.5#    Wall slide with lift off 2x10 GTB 20x10 GTB 20x10 GTB 20x10 GTB    Palloff press   Paloff press x10 B BTB BTB 3\"x20 ea CC 12.5# x15 B    CC offset push press     Side stepping in half squat 6x10' yellow loop   CC D2 flexion  2x10 Blk TB Split stance D1 flex x10 B BTB Split stance D1 flex x10 B BTB    Prone ext, row, T        Pec stretch  10x10s at door 10\"x10 at door      bridges    5\"x20 With mob belt 2x10    clamshells    X10 ea    Hip add squeeze     3sx20   Wall clocks         TRX pull ups and push ups     Lifting mechanics 2x10 with 18#     Shoulder flex, abd, scap x10 each 4# Shoulder flex, abd, scap 2x10 each 4#  Shoulder flex, abd, scap 2x10 each 4#    TherAct        Patient education                                        Gait Training                                Modalities        CP                         "

## 2024-02-07 ENCOUNTER — OFFICE VISIT (OUTPATIENT)
Dept: PHYSICAL THERAPY | Facility: CLINIC | Age: 49
End: 2024-02-07
Payer: COMMERCIAL

## 2024-02-07 DIAGNOSIS — M54.16 LUMBAR RADICULOPATHY: ICD-10-CM

## 2024-02-07 DIAGNOSIS — M25.512 LEFT SHOULDER PAIN, UNSPECIFIED CHRONICITY: ICD-10-CM

## 2024-02-07 DIAGNOSIS — M25.562 LEFT KNEE PAIN, UNSPECIFIED CHRONICITY: Primary | ICD-10-CM

## 2024-02-07 DIAGNOSIS — M79.642 LEFT HAND PAIN: ICD-10-CM

## 2024-02-07 PROCEDURE — 97110 THERAPEUTIC EXERCISES: CPT

## 2024-02-07 NOTE — PROGRESS NOTES
Daily Note     Today's date: 2024  Patient name: José Miguel Lazaro  : 1975  MRN: [id number]  Referring provider: Ailyn Callahan DO  Dx:   Encounter Diagnosis     ICD-10-CM    1. Left knee pain, unspecified chronicity  M25.562       2. Left shoulder pain, unspecified chronicity  M25.512       3. Left hand pain  M79.642       4. Lumbar radiculopathy  M54.16                 Start Time: 0800  Stop Time: 0830  Total time in clinic (min): 30 minutes    Subjective: Patient state his shoulder has been fine. He has some back pain today. He had to climb stairs yesterday and had increased L knee pain as a result.       Objective: See treatment diary below      Assessment: Patient reports increased fatigue post tx today. He was highly challenged with eccentric lowering of L LE. Will continue to progress as tolerated.       Plan: Continue per plan of care.        Insurance:  AMA/CMS Eval/ Re-eval POC expires FOTO Auth #/ Referral # Total   Visits  Start date  Expiration date Extension  Visit limitation?  PT only or  PT+OT? Co-Insurance   [facility]   Approved  12 24  BOMN  PT $0                                                                 AUTH #:  Date 11/7 11/14 11/21 11/30 12/18 12/21 1/8 1/15 1/18 1/22 2/5/24 2/7/24   Visits  Authed: 12 Used 1 1 1 1 1 1 1 1 1 1 1 1    Remaining  11 10 9 8 7 6 5 4 3 2 1 0        Precautions: standard   Past Medical History:   Diagnosis Date    Anemia     GERD (gastroesophageal reflux disease)          Date 1/8/24 1/15/24 1/18/24 1/22/24 2/5/24 2/7/24   Visit Number 7 8 9 10 11 12            Manual                                    Neuro Re-ed         Serratus push up Ball on wall x30 circles each direction       Ball on wall x30 circles each direction    TKE 3sx20 B   Ball on wall  X30 L/R, up/down, circles each  X30 L/R, up/down, circles each    TRX squats 2x10  TRX squats 2x10     TRX reverse lunges 2x10 B    S/L rotation      Quad sets 3sx20 SLB on foam 5x10s B       " X band walk 4x10' green looped band  SLR 2x10     Prone prop    2'x2 REIL x10     REIL with self OP x10  REIL with self OP x10    PPU    2x10 Hip burners x15 B  Hip burners x15 B    Open books    X10 ea           Lateral heel taps 6\" 2x10 B    TherEx         Pulleys          TB shoulder ext  2x10 blk TB 3x10 blk TB 3x10 blk TB 3x10 blk TB     TB row  2x10 Blk TB 3x10 blk TB 3x10 blk TB 3x10 blk TB  2x10 CC 12.5#   TB IR/ER  2x10  Blk TB  2x10 blk TB 2x10 blk TB 2x10 blk TB     No money  2x10 GTB 2x10 GTB       1st rib mob with SOS      D2 chops 15 B 7.5#  D2 chops 15 B 12.5#    Wall slide with lift off 2x10 GTB 20x10 GTB 20x10 GTB 20x10 GTB     Palloff press   Paloff press x10 B BTB BTB 3\"x20 ea CC 12.5# x15 B  CC 12.5# x15 B    CC offset push press     Side stepping in half squat 6x10' yellow loop    CC D2 flexion  2x10 Blk TB Split stance D1 flex x10 B BTB Split stance D1 flex x10 B BTB     Prone ext, row, T      Leg press 2x10 eccentric focus 115#    Pec stretch  10x10s at door 10\"x10 at door       bridges    5\"x20 With mob belt 2x10     clamshells    X10 ea     Hip add squeeze     3sx20    Wall clocks          TRX pull ups and push ups     Lifting mechanics 2x10 with 18#      Shoulder flex, abd, scap x10 each 4# Shoulder flex, abd, scap 2x10 each 4#  Shoulder flex, abd, scap 2x10 each 4#     TherAct         Patient education                                             Gait Training                                    Modalities         CP                          "

## 2024-02-14 ENCOUNTER — OFFICE VISIT (OUTPATIENT)
Dept: PHYSICAL THERAPY | Facility: CLINIC | Age: 49
End: 2024-02-14
Payer: COMMERCIAL

## 2024-02-14 DIAGNOSIS — M25.512 LEFT SHOULDER PAIN, UNSPECIFIED CHRONICITY: ICD-10-CM

## 2024-02-14 DIAGNOSIS — M25.562 LEFT KNEE PAIN, UNSPECIFIED CHRONICITY: Primary | ICD-10-CM

## 2024-02-14 DIAGNOSIS — M54.16 LUMBAR RADICULOPATHY: ICD-10-CM

## 2024-02-14 PROCEDURE — 97110 THERAPEUTIC EXERCISES: CPT

## 2024-02-14 NOTE — PROGRESS NOTES
Daily Note     Today's date: 2024  Patient name: José Miguel Lazaro  : 1975  MRN: [id number]  Referring provider: Ailyn Callahan DO  Dx:   Encounter Diagnosis     ICD-10-CM    1. Left knee pain, unspecified chronicity  M25.562       2. Left shoulder pain, unspecified chronicity  M25.512       3. Lumbar radiculopathy  M54.16             Start Time: 0800  Stop Time: 08  Total time in clinic (min): 25 minutes    Subjective: Patient reports increased lumbar spine pain today. He was having increased pain when he was bending over to lift objects up.       Objective: See treatment diary below      Assessment: Patient and therapist had discussion on returning to pain management, as he does not have consistent results in symptom management of his lumbar spine over the course of the last few weeks. Patient agrees to contacting physician regarding pain management appointment. Will continue to progress as tolerated.       Plan: Continue per plan of care.        Insurance:  AMA/CMS Eval/ Re-eval POC expires FOTO Auth #/ Referral # Total   Visits  Start date  Expiration date Extension  Visit limitation?  PT only or  PT+OT? Co-Insurance   [facility]   Approved  24  BOMN  PT $0                                                                 AUTH #:  Date 11/7 11/14 11/21 11/30 12/18 12/21 1/8 1/15 1/18 1/22 2/5/24 2/7/24   Visits  Authed: 12 Used 1 1 1 1 1 1 1 1 1 1 1 1    Remaining  11 10 9 8 7 6 5 4 3 2 1 0        Precautions: standard   Past Medical History:   Diagnosis Date    Anemia     GERD (gastroesophageal reflux disease)          Date 24   Visit Number 9 10 11 12 13           Manual                                Neuro Re-ed        Serratus push up    TKE 3sx20 B    Ball on wall    TRX squats 2x10  TRX squats 2x10     TRX reverse lunges 2x10 B     S/L rotation    Quad sets 3sx20 SLB on foam 5x10s B      X band walk 4x10' green looped band  SLR 2x10      Prone  "prop  2'x2 REIL x10     REIL with self OP x10  REIL with self OP x10  REIL with self OP x10     REIL with self OP x10 followed by RFIL x10     REIL with dowel x10    PPU  2x10 Hip burners x15 B  Hip burners x15 B     Open books  X10 ea          Lateral heel taps 6\" 2x10 B     TherEx        Pulleys         TB shoulder ext  3x10 blk TB 3x10 blk TB   2x10 CC 12.5#   TB row  3x10 blk TB 3x10 blk TB  2x10 CC 12.5# 2x10 CC 12.5#   TB IR/ER  2x10 blk TB 2x10 blk TB      No money         1st rib mob with SOS    D2 chops 15 B 7.5#  D2 chops 15 B 12.5#     Wall slide with lift off 20x10 GTB 20x10 GTB      Palloff press Paloff press x10 B BTB BTB 3\"x20 ea CC 12.5# x15 B  CC 12.5# x15 B  CC 12.5# x15 B    CC offset push press   Side stepping in half squat 6x10' yellow loop     CC D2 flexion Split stance D1 flex x10 B BTB Split stance D1 flex x10 B BTB      Prone ext, row, T    Leg press 2x10 eccentric focus 115#     Pec stretch         bridges  5\"x20 With mob belt 2x10      clamshells  X10 ea      Hip add squeeze   3sx20     Wall clocks         TRX pull ups and push ups   Lifting mechanics 2x10 with 18#        Shoulder flex, abd, scap 2x10 each 4#      TherAct        Patient education                                        Gait Training                                Modalities        CP                         "

## 2024-02-16 ENCOUNTER — OFFICE VISIT (OUTPATIENT)
Dept: PHYSICAL THERAPY | Facility: CLINIC | Age: 49
End: 2024-02-16
Payer: COMMERCIAL

## 2024-02-16 DIAGNOSIS — M25.512 LEFT SHOULDER PAIN, UNSPECIFIED CHRONICITY: Primary | ICD-10-CM

## 2024-02-16 DIAGNOSIS — M54.16 LUMBAR RADICULOPATHY: ICD-10-CM

## 2024-02-16 PROCEDURE — 97110 THERAPEUTIC EXERCISES: CPT

## 2024-02-16 PROCEDURE — 97112 NEUROMUSCULAR REEDUCATION: CPT

## 2024-02-16 NOTE — PROGRESS NOTES
Daily Note     Today's date: 2024  Patient name: José Miguel Lazaro  : 1975  MRN: [id number]  Referring provider: Ailyn Callahan DO  Dx:   No diagnosis found.                   Subjective: Patient reports pain in lumbar spine is constant.  Has appointment with pain management on Thursday.        Objective: See treatment diary below      Assessment: Patient had increased pain when lifting right lower extremity to lay prone on table. Introduced core stabilization to support lumbar spine. KALEB with dowel provided some pain relief. Continue per primary PT's POC.      Plan: Continue per plan of care.        Insurance:  AMA/CMS Eval/ Re-eval POC expires FOTO Auth #/ Referral # Total   Visits  Start date  Expiration date Extension  Visit limitation?  PT only or  PT+OT? Co-Insurance   [facility]   Approved  24  BOMN  PT $0                                                                 AUTH #:  Date 11/7 11/14 11/21 11/30 12/18 12/21 1/8 1/15 1/18 1/22 2/5/24 2/7/24   Visits  Authed: 12 Used 1 1 1 1 1 1 1 1 1 1 1 1    Remaining  11 10 9 8 7 6 5 4 3 2 1 0        Precautions: standard   Past Medical History:   Diagnosis Date    Anemia     GERD (gastroesophageal reflux disease)          Date 24   Visit Number 9 10 11 12 13 14            Manual                                    Neuro Re-ed         Serratus push up    TKE 3sx20 B     Ball on wall    TRX squats 2x10  TRX squats 2x10     TRX reverse lunges 2x10 B   TA engagement + SLR 2*10 b/l   S/L rotation    Quad sets 3sx20 SLB on foam 5x10s B   Bridge + ADD squeeze 2*10    X band walk 4x10' green looped band  SLR 2x10       Prone prop  2'x2 REIL x10     REIL with self OP x10  REIL with self OP x10  REIL with self OP x10     REIL with self OP x10 followed by RFIL x10     REIL with dowel x10  REIL * 20x  KALEB with dowel 10x   PPU  2x10 Hip burners x15 B  Hip burners x15 B      Open books  X10 ea            "Lateral heel taps 6\" 2x10 B      TherEx         Pulleys          TB shoulder ext  3x10 blk TB 3x10 blk TB   2x10 CC 12.5# 2x10 CC 12.5#   TB row  3x10 blk TB 3x10 blk TB  2x10 CC 12.5# 2x10 CC 12.5# 2x10 CC 12.5#   TB IR/ER  2x10 blk TB 2x10 blk TB       No money          1st rib mob with SOS    D2 chops 15 B 7.5#  D2 chops 15 B 12.5#      Wall slide with lift off 20x10 GTB 20x10 GTB       Palloff press Paloff press x10 B BTB BTB 3\"x20 ea CC 12.5# x15 B  CC 12.5# x15 B  CC 12.5# x15 B     CC offset push press   Side stepping in half squat 6x10' yellow loop      CC D2 flexion Split stance D1 flex x10 B BTB Split stance D1 flex x10 B BTB       Prone ext, row, T    Leg press 2x10 eccentric focus 115#      Pec stretch          bridges  5\"x20 With mob belt 2x10       clamshells  X10 ea       Hip add squeeze   3sx20      Wall clocks          TRX pull ups and push ups   Lifting mechanics 2x10 with 18#         Shoulder flex, abd, scap 2x10 each 4#       TherAct         Patient education                                             Gait Training                                    Modalities         CP                          "

## 2024-02-19 ENCOUNTER — OFFICE VISIT (OUTPATIENT)
Dept: PHYSICAL THERAPY | Facility: CLINIC | Age: 49
End: 2024-02-19
Payer: COMMERCIAL

## 2024-02-19 DIAGNOSIS — M25.512 LEFT SHOULDER PAIN, UNSPECIFIED CHRONICITY: Primary | ICD-10-CM

## 2024-02-19 DIAGNOSIS — M54.16 LUMBAR RADICULOPATHY: ICD-10-CM

## 2024-02-19 PROCEDURE — 97112 NEUROMUSCULAR REEDUCATION: CPT

## 2024-02-19 PROCEDURE — 97110 THERAPEUTIC EXERCISES: CPT
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
## 2024-02-21 ENCOUNTER — APPOINTMENT (OUTPATIENT)
Dept: PHYSICAL THERAPY | Facility: CLINIC | Age: 49
End: 2024-02-21
Payer: COMMERCIAL

## 2024-02-22 ENCOUNTER — TELEPHONE (OUTPATIENT)
Age: 49
End: 2024-02-22

## 2024-02-22 ENCOUNTER — OFFICE VISIT (OUTPATIENT)
Dept: PAIN MEDICINE | Facility: CLINIC | Age: 49
End: 2024-02-22
Payer: COMMERCIAL

## 2024-02-22 VITALS
DIASTOLIC BLOOD PRESSURE: 88 MMHG | SYSTOLIC BLOOD PRESSURE: 154 MMHG | HEIGHT: 68 IN | BODY MASS INDEX: 27.58 KG/M2 | HEART RATE: 67 BPM | WEIGHT: 182 LBS

## 2024-02-22 DIAGNOSIS — M54.50 LOW BACK PAIN: ICD-10-CM

## 2024-02-22 DIAGNOSIS — M54.50 BILATERAL LOW BACK PAIN WITHOUT SCIATICA, UNSPECIFIED CHRONICITY: Chronic | ICD-10-CM

## 2024-02-22 PROCEDURE — 99214 OFFICE O/P EST MOD 30 MIN: CPT | Performed by: STUDENT IN AN ORGANIZED HEALTH CARE EDUCATION/TRAINING PROGRAM

## 2024-02-22 RX ORDER — METHYLPREDNISOLONE 4 MG/1
TABLET ORAL
Qty: 1 EACH | Refills: 0 | Status: SHIPPED | OUTPATIENT
Start: 2024-02-22

## 2024-02-22 RX ORDER — LIDOCAINE 50 MG/G
1 PATCH TOPICAL DAILY
Qty: 15 PATCH | Refills: 0 | Status: SHIPPED | OUTPATIENT
Start: 2024-02-22 | End: 2024-02-23

## 2024-02-22 NOTE — PROGRESS NOTES
Pain Medicine Follow-Up Note    Assessment:  1. Bilateral low back pain without sciatica, unspecified chronicity    2. Low back pain      Patient is a pleasant 48-year-old male who presents as a follow-up visit after last being seen on 1/11/2024 for low back pain without radicular symptoms.  Since that time patient has underwent physical therapy.  He states symptoms are worse since the last visit rating his pain as a 10 out of 10 on numeric rating scale.  Pain is worse throughout the day and the pain is constant, occurring 100% of the time.  The quality pain is throbbing, cramping, pressure-like Primarily in his bilateral lower back with no radicular symptoms.  Patient is taking Robaxin which states helps somewhat.    On further discussion with patient he states his symptoms are worse since undergoing PT.  On discussion on medications he has tried he states he really did not start the Robaxin and he never got the Medrol Dosepak.  Additionally he never trialed the Lidoderm patches.  Discussed with patient to try these things first to see if they provide benefit.  Additionally given patient has failed 6 weeks of conservative management with physical therapy we will place an order for MRI of the lumbar spine to further evaluate.  Patient counseled to continue with home exercise program and physical therapy as he can tolerate.  Plan:  Restart medrol dose pack  Retrial robaxin 500 mg TID prn  Start lidocaine 5% patches  Continue diclofenac ointment 1% TID prn   Continue HEP   MRI lumbar spine without contrast   Orders Placed This Encounter   Procedures   • MRI lumbar spine wo contrast     Standing Status:   Future     Standing Expiration Date:   2/22/2028     Scheduling Instructions:      There is no preparation for this test. Please leave your jewelry and valuables at home, wedding rings are the exception. All patients will be required to change into a hospital gown and pants.  Street clothes are not permitted in the  MRI.  Magnetic nail polish must be removed prior to arrival for your test. Please bring your insurance cards, a form of photo ID and a list of your medications with you. Arrive 15 minutes prior to your appointment time in order to register. Please bring any prior CT or MRI studies of this area that were not performed at a [facility].            To schedule this appointment, please contact Central Scheduling at [phone].            Prior to your appointment, please make sure you complete the MRI Screening Form when you e-Check in for your appointment. This will be available starting 7 days before your appointment in [employer]. You may receive an e-mail with an activation code if you do not have a [employer] account. If you do not have access to a device, we will complete your screening at your appointment.     Order Specific Question:   What is the patient's sedation requirement? If Medication for Claustrophobia is selected, order medication at this point.     Answer:   No Sedation     Order Specific Question:   Does this procedure require the 3T MRI at [place] or [place]?     Answer:   No     Order Specific Question:   Release to patient through [facility]     Answer:   Immediate     Order Specific Question:   Is order priority selected as STAT?     Answer:   No     Order Specific Question:   Reason for Exam (FREE TEXT)     Answer:   Low back pain with failure of conservative therapy     Order Specific Question:   When should the test be performed?     Answer:   Urgent- less than one week       New Medications Ordered This Visit   Medications   • methylPREDNISolone 4 MG tablet therapy pack     Sig: Use as directed on package     Dispense:  1 each     Refill:  0   • lidocaine (Lidoderm) 5 %     Sig: Apply 1 patch topically over 12 hours daily Remove & Discard patch within 12 hours or as directed by MD     Dispense:  15 patch     Refill:  0       My impressions and treatment recommendations were discussed in detail  with the patient who verbalized understanding and had no further questions.        Follow-up is planned in six weeks time or sooner as warranted.  Discharge instructions were provided. I personally saw and examined the patient and I agree with the above discussed plan of care.    History of Present Illness:    José Miguel Lazaro is a 48 y.o. male who presents to [facility] for interval re-evaluation of the above stated pain complaints. The patient has a past medical and chronic pain history as outlined in the assessment section. He was last seen on  1/11/2024.    Patient is a pleasant 48-year-old male who presents as a follow-up visit after last being seen on 1/11/2024 for low back pain without radicular symptoms.  Since that time patient has underwent physical therapy.  He states symptoms are worse since the last visit rating his pain as a 10 out of 10 on numeric rating scale.  Pain is worse throughout the day and the pain is constant, occurring 100% of the time.  The quality pain is throbbing, cramping, pressure-like Primarily in his bilateral lower back with no radicular symptoms.  Patient is taking Robaxin which states helps somewhat.        Other than as stated above, the patient denies any interval changes in medications, medical condition, mental condition, symptoms, or allergies since the last office visit.         Review of Systems:    Review of Systems   Constitutional:  Negative for unexpected weight change.   HENT:  Negative for ear pain.    Eyes:  Negative for visual disturbance.   Respiratory:  Negative for shortness of breath and wheezing.    Gastrointestinal:  Negative for abdominal pain.   Musculoskeletal:  Positive for back pain and gait problem.        Joint stiffness and muscle weakness.Decreased ROM ,Bending is hurting his back,    Neurological:  Positive for weakness. Negative for numbness.   Psychiatric/Behavioral:  Positive for sleep disturbance. Negative for decreased  concentration.          Past Medical History:   Diagnosis Date   • Anemia    • GERD (gastroesophageal reflux disease)        Past Surgical History:   Procedure Laterality Date   • DENTAL SURGERY     • NO PAST SURGERIES     • WISDOM TOOTH EXTRACTION         Family History   Problem Relation Age of Onset   • Diabetes Mother    • Diabetes Father    • Cancer Father    • Diabetes Paternal Aunt        Social History     Occupational History   • Not on file   Tobacco Use   • Smoking status: Every Day     Types: E-Cigarettes   • Smokeless tobacco: Never   • Tobacco comments:     vapes, used to smoke cigarretes 0.25 ppd for 15-20 years   Vaping Use   • Vaping status: Every Day   • Substances: Nicotine, CBD   Substance and Sexual Activity   • Alcohol use: Yes     Alcohol/week: 7.0 standard drinks of alcohol     Types: 7 Standard drinks or equivalent per week     Comment: varies. maybe five shooters a day, last drink couple days ago   • Drug use: Not Currently   • Sexual activity: Yes     Partners: Female     Birth control/protection: None     Comment: Wife         Current Outpatient Medications:   •  Diclofenac Sodium (VOLTAREN) 1 %, Apply 2 g topically 4 (four) times a day To affected area, Disp: 100 g, Rfl: 1  •  lidocaine (Lidoderm) 5 %, Apply 1 patch topically over 12 hours daily Remove & Discard patch within 12 hours or as directed by MD, Disp: 15 patch, Rfl: 0  •  methylPREDNISolone 4 MG tablet therapy pack, Use as directed on package, Disp: 1 each, Rfl: 0  •  Multiple Vitamin (multivitamin) tablet, Take 1 tablet by mouth daily, Disp: , Rfl:   •  Nutritional Supplements (VITAMIN D BOOSTER PO), Take by mouth in the morning, Disp: , Rfl:   •  Omega-3 Fatty Acids (FISH OIL CONCENTRATE PO), Take by mouth in the morning, Disp: , Rfl:   •  acetaminophen (TYLENOL) 325 mg tablet, Take 650 mg by mouth every 6 (six) hours as needed for mild pain (Patient not taking: Reported on 2/22/2024), Disp: , Rfl:   •  atorvastatin (LIPITOR)  "20 mg tablet, Take 1 tablet (20 mg total) by mouth daily, Disp: 30 tablet, Rfl: 0  •  Biotin 1 MG CAPS, Take by mouth (Patient not taking: Reported on 2/22/2024), Disp: , Rfl:   •  ciprofloxacin-dexamethasone (CIPRODEX) otic suspension, Administer 4 drops into both ears 2 (two) times a day for 7 days, Disp: 7.5 mL, Rfl: 0  •  famotidine (PEPCID) 20 mg tablet, Take 1 tablet (20 mg total) by mouth daily (Patient not taking: Reported on 1/11/2024), Disp: 30 tablet, Rfl: 0  •  meloxicam (Mobic) 15 mg tablet, Take 1 tablet (15 mg total) by mouth daily, Disp: 30 tablet, Rfl: 0  •  methocarbamol (ROBAXIN) 500 mg tablet, Take 1 tablet (500 mg total) by mouth 2 (two) times a day as needed for muscle spasms, Disp: 60 tablet, Rfl: 0  •  naproxen (Naprosyn) 500 mg tablet, Take 1 tablet (500 mg total) by mouth 2 (two) times a day with meals for 14 days (Patient not taking: Reported on 11/7/2023), Disp: 28 tablet, Rfl: 0  •  valACYclovir (VALTREX) 1,000 mg tablet, Take 2 tablets (2,000 mg total) by mouth 2 (two) times a day for 1 day, Disp: 4 tablet, Rfl: 0    No Known Allergies    Physical Exam:    /88   Pulse 67   Ht 5' 8\" (1.727 m)   Wt 82.6 kg (182 lb)   BMI 27.67 kg/m²     Constitutional:normal, well developed, well nourished, alert, in no distress and non-toxic and no overt pain behavior.  Eyes:anicteric  HEENT:grossly intact  Neck:supple, symmetric, trachea midline and no masses   Pulmonary:even and unlabored  Cardiovascular:No edema or pitting edema present  Skin:Normal without rashes or lesions and well hydrated  Psychiatric:Mood and affect appropriate  Neurologic:Cranial Nerves II-XII grossly intact  Musculoskeletal:normal gait. TTP in bilateral lumbar spine.       Imaging  XR Lumbar spine 1/11/2024  Narrative & Impression         LUMBAR SPINE     INDICATION:   Low back pain, unspecified.      COMPARISON:  Comparison made with previous examination(s) dated (CR) 28-Jan-2020.     VIEWS:  XR SPINE LUMBAR 2 OR 3 " VIEWS INJURY     FINDINGS:     There are 5 non rib bearing lumbar vertebral bodies.      There is no evidence of acute fracture or destructive osseous lesion.     Alignment is unremarkable.      No significant lumbar degenerative change noted.     The pedicles appear intact.     Soft tissues are unremarkable.     IMPRESSION:        Normal examination.     MRI lumbar spine wo contrast    (Results Pending)         Orders Placed This Encounter   Procedures   • MRI lumbar spine wo contrast

## 2024-02-22 NOTE — TELEPHONE ENCOUNTER
Caller: jacques Valdez     Doctor: Lisandro     Reason for call: pt stated he received a call about an earlier appt for today, pt stated he can come in earlier today. Please Advise     Call back#: [phone]

## 2024-02-23 ENCOUNTER — TELEPHONE (OUTPATIENT)
Dept: FAMILY MEDICINE CLINIC | Facility: CLINIC | Age: 49
End: 2024-02-23

## 2024-02-23 DIAGNOSIS — M54.50 LOW BACK PAIN: ICD-10-CM

## 2024-02-23 RX ORDER — LIDOCAINE 50 MG/G
PATCH TOPICAL
Qty: 15 PATCH | Refills: 0 | Status: SHIPPED | OUTPATIENT
Start: 2024-02-23

## 2024-02-23 RX ORDER — LIDOCAINE 50 MG/G
PATCH TOPICAL
Qty: 15 PATCH | Refills: 0 | Status: SHIPPED | OUTPATIENT
Start: 2024-02-23 | End: 2024-02-23

## 2024-02-23 NOTE — TELEPHONE ENCOUNTER
Tom Franco,   A diagnosis of pre-diabetes does not warrant a referral to endocrinology. He just needs to watch his diet, eat low carb/low fat/low sugar foods, and exercise.   I did explain this during my visit with him in September when I reviewed his lab results with him. He can repeat the HbA1c test in July to see how the levels are.   Thank you!

## 2024-02-23 NOTE — TELEPHONE ENCOUNTER
Pt was diagnosed with pre-diabetes, and would like a referral to endocrinology. Are you able to place the order?

## 2024-02-26 ENCOUNTER — TELEPHONE (OUTPATIENT)
Age: 49
End: 2024-02-26

## 2024-02-26 NOTE — TELEPHONE ENCOUNTER
PLAN HAS DENIED LIDO 5%    NOT A COVERED DX    PLAN WILL CONSIDER COVERASGE IF DX IS POST HEPETIC NEURALGIA     DIABETIC NEURALGIA    PAIN FROM CANCER

## 2024-03-02 ENCOUNTER — HOSPITAL ENCOUNTER (OUTPATIENT)
Dept: RADIOLOGY | Facility: HOSPITAL | Age: 49
Discharge: HOME | End: 2024-03-02
Attending: STUDENT IN AN ORGANIZED HEALTH CARE EDUCATION/TRAINING PROGRAM
Payer: COMMERCIAL

## 2024-03-02 DIAGNOSIS — M54.50 BILATERAL LOW BACK PAIN WITHOUT SCIATICA, UNSPECIFIED CHRONICITY: Chronic | ICD-10-CM

## 2024-03-02 PROCEDURE — 72148 MRI LUMBAR SPINE W/O DYE: CPT

## 2024-03-03 ENCOUNTER — HOSPITAL ENCOUNTER (EMERGENCY)
Facility: HOSPITAL | Age: 49
Discharge: HOME | End: 2024-03-03
Attending: EMERGENCY MEDICINE
Payer: COMMERCIAL

## 2024-03-03 VITALS
TEMPERATURE: 98 F | SYSTOLIC BLOOD PRESSURE: 155 MMHG | OXYGEN SATURATION: 99 % | HEART RATE: 61 BPM | RESPIRATION RATE: 16 BRPM | DIASTOLIC BLOOD PRESSURE: 99 MMHG

## 2024-03-03 DIAGNOSIS — J11.1 INFLUENZA: Primary | ICD-10-CM

## 2024-03-03 PROCEDURE — 99283 EMERGENCY DEPT VISIT LOW MDM: CPT

## 2024-03-03 RX ORDER — GUAIFENESIN/DEXTROMETHORPHAN 100-10MG/5
10 SYRUP ORAL ONCE
Status: COMPLETED | OUTPATIENT
Start: 2024-03-03 | End: 2024-03-03

## 2024-03-03 RX ORDER — BENZONATATE 100 MG/1
100 CAPSULE ORAL 2 TIMES DAILY PRN
Qty: 21 CAPSULE | Refills: 0 | Status: SHIPPED | OUTPATIENT
Start: 2024-03-03

## 2024-03-03 RX ORDER — PREDNISONE 20 MG/1
40 TABLET ORAL DAILY
Qty: 8 TABLET | Refills: 0 | Status: SHIPPED | OUTPATIENT
Start: 2024-03-03 | End: 2024-03-07

## 2024-03-03 RX ORDER — DEXTROMETHORPHAN POLISTIREX 30 MG/5ML
10 SUSPENSION ORAL 2 TIMES DAILY
Qty: 148 ML | Refills: 0 | Status: SHIPPED | OUTPATIENT
Start: 2024-03-03

## 2024-03-03 RX ORDER — NAPROXEN 500 MG/1
500 TABLET ORAL 2 TIMES DAILY WITH MEALS
Qty: 30 TABLET | Refills: 0 | Status: SHIPPED | OUTPATIENT
Start: 2024-03-03

## 2024-03-03 RX ORDER — PSEUDOEPHEDRINE HCL 30 MG
60 TABLET ORAL ONCE
Status: COMPLETED | OUTPATIENT
Start: 2024-03-03 | End: 2024-03-03

## 2024-03-03 RX ORDER — FLUTICASONE PROPIONATE 50 MCG
1 SPRAY, SUSPENSION (ML) NASAL DAILY
Status: DISCONTINUED | OUTPATIENT
Start: 2024-03-03 | End: 2024-03-03 | Stop reason: HOSPADM

## 2024-03-03 RX ORDER — ACETAMINOPHEN 325 MG/1
975 TABLET ORAL ONCE
Status: COMPLETED | OUTPATIENT
Start: 2024-03-03 | End: 2024-03-03

## 2024-03-03 RX ORDER — BENZONATATE 100 MG/1
100 CAPSULE ORAL ONCE
Status: COMPLETED | OUTPATIENT
Start: 2024-03-03 | End: 2024-03-03

## 2024-03-03 RX ORDER — ACETAMINOPHEN 500 MG
1000 TABLET ORAL 2 TIMES DAILY
Qty: 30 TABLET | Refills: 0 | Status: SHIPPED | OUTPATIENT
Start: 2024-03-03

## 2024-03-03 RX ORDER — PSEUDOEPHEDRINE HCL 120 MG/1
120 TABLET, FILM COATED, EXTENDED RELEASE ORAL 2 TIMES DAILY
Qty: 10 TABLET | Refills: 0 | Status: SHIPPED | OUTPATIENT
Start: 2024-03-03 | End: 2024-03-08

## 2024-03-03 RX ORDER — NAPROXEN 500 MG/1
500 TABLET ORAL ONCE
Status: COMPLETED | OUTPATIENT
Start: 2024-03-03 | End: 2024-03-03

## 2024-03-03 RX ADMIN — NAPROXEN 500 MG: 500 TABLET ORAL at 04:37

## 2024-03-03 RX ADMIN — FLUTICASONE PROPIONATE 1 SPRAY: 50 SPRAY, METERED NASAL at 04:35

## 2024-03-03 RX ADMIN — ACETAMINOPHEN 975 MG: 325 TABLET ORAL at 04:37

## 2024-03-03 RX ADMIN — GUAIFENESIN AND DEXTROMETHORPHAN 10 ML: 100; 10 SYRUP ORAL at 04:37

## 2024-03-03 RX ADMIN — PSEUDOEPHEDRINE HCL 60 MG: 30 TABLET, FILM COATED ORAL at 04:37

## 2024-03-03 RX ADMIN — BENZONATATE 100 MG: 100 CAPSULE ORAL at 04:38

## 2024-03-03 NOTE — ED PROVIDER NOTES
Final Diagnosis:  1. Influenza        Chief Complaint   Patient presents with    Flu Symptoms     Here with family, satrted with flu symptoms after family diagnosed with flu b        HPI  Presents with family significant other started with flu got on Tamiflu since the end of illness daughter started with flu tested flu be positive now on Tamiflu for 2 days.  He started with similar symptoms bad cough body aches headache nasal congestion.  No contributory medical history well-appearing no increased work of breathing no trouble speaking no distress    EMS reports if applicable:     - Previous charting underwent limited review with attention to last ED visits, labs, ekgs, and prior imaging.  Chart review reveals :     Office Visit on 05/01/2023   Component Date Value Ref Range Status    Glucose, Random 07/21/2023 99  70 - 99 mg/dL Final    BUN 07/21/2023 10  6 - 24 mg/dL Final    Creatinine 07/21/2023 0.99  0.76 - 1.27 mg/dL Final    eGFR 07/21/2023 95  >59 mL/min/1.73 Final    SL AMB BUN/CREATININE RATIO 07/21/2023 10  9 - 20 Final    Sodium 07/21/2023 138  134 - 144 mmol/L Final    Potassium 07/21/2023 4.1  3.5 - 5.2 mmol/L Final    Chloride 07/21/2023 101  96 - 106 mmol/L Final    CO2 07/21/2023 24  20 - 29 mmol/L Final    CALCIUM 07/21/2023 9.4  8.7 - 10.2 mg/dL Final    Protein, Total 07/21/2023 7.0  6.0 - 8.5 g/dL Final    Albumin 07/21/2023 4.4  4.1 - 5.1 g/dL Final                  **Please note reference interval change**    Globulin, Total 07/21/2023 2.6  1.5 - 4.5 g/dL Final    Albumin/Globulin Ratio 07/21/2023 1.7  1.2 - 2.2 Final    TOTAL BILIRUBIN 07/21/2023 0.4  0.0 - 1.2 mg/dL Final    Alk Phos Isoenzymes 07/21/2023 48  44 - 121 IU/L Final    AST 07/21/2023 25  0 - 40 IU/L Final    ALT 07/21/2023 35  0 - 44 IU/L Final    Hemoglobin A1C 07/21/2023 5.8 (H)  4.8 - 5.6 % Final    Comment:          Prediabetes: 5.7 - 6.4           Diabetes: >6.4           Glycemic control for adults with diabetes: <7.0       Estimated Average Glucose 07/21/2023 120  mg/dL Final    Prostate Specific Antigen Total 07/21/2023 0.3  0.0 - 4.0 ng/mL Final    Comment: Roche ECLIA methodology.  According to the American U[employer] Association, Serum PSA should  decrease and remain at undetectable levels after radical  prostatectomy. The AUA defines biochemical recurrence as an initial  PSA value 0.2 ng/mL or greater followed by a subsequent confirmatory  PSA value 0.2 ng/mL or greater.  Values obtained with different assay methods or kits cannot be used  interchangeably. Results cannot be interpreted as absolute evidence  of the presence or absence of malignant disease.      PSA, Free 07/21/2023 0.12  N/A ng/mL Final    Roche ECLIA methodology.    PSA, Free Pct 07/21/2023 40.0  % Final    Comment: The table below lists the probability of prostate cancer for  men with non-suspicious ABBIE results and total PSA between  4 and 10 ng/mL, by patient age (Bam et al, VANESSA 1998,  279:1542).                    % Free PSA       50-64 yr        65-75 yr                    0.00-10.00%        56%             55%                   10.01-15.00%        24%             35%                   15.01-20.00%        17%             23%                   20.01-25.00%        10%             20%                        >25.00%         5%              9%  Please note:  Bam et al did not make specific                recommendations regarding the use of                percent free PSA for any other population                of men.      Cholesterol, Total 07/21/2023 209 (H)  100 - 199 mg/dL Final    Triglycerides 07/21/2023 133  0 - 149 mg/dL Final    HDL 07/21/2023 58  >39 mg/dL Final    VLDL Cholesterol Calculated 07/21/2023 24  5 - 40 mg/dL Final    LDL Calculated 07/21/2023 127 (H)  0 - 99 mg/dL Final    LDl/HDL Ratio 07/21/2023 2.2  0.0 - 3.6 ratio Final    Comment:                                     LDL/HDL Ratio                                               Men  Women                                1/2 Avg.Risk  1.0    1.5                                    Avg.Risk  3.6    3.2                                 2X Avg.Risk  6.2    5.0                                 3X Avg.Risk  8.0    6.1         - No language barrier.   - History obtained from patient    - Discuss patient's care, with patient permission or by chart review, with  sig other and kid    PMH:   has a past medical history of Anemia and GERD (gastroesophageal reflux disease).    PSH:   has a past surgical history that includes Dental surgery; No past surgeries; and [place] tooth extraction.     Social History:  Tobacco Use: High Risk (3/3/2024)    Patient History     Smoking Tobacco Use: Every Day     Smokeless Tobacco Use: Never     Passive Exposure: Not on file     Alcohol Use: Not At Risk (6/8/2021)    AUDIT-C     Frequency of Alcohol Consumption: 2-3 times a week     Average Number of Drinks: 1 or 2     Frequency of Binge Drinking: Never     No illicit use       ROS:  Pertinent positives/negatives: .     Some ROS may be present in the HPI and would take precedent over these standard questions asked below.   Review of Systems   Constitutional:  Positive for chills, fatigue and fever.   HENT:  Positive for congestion and rhinorrhea.    Respiratory:  Positive for cough.    Neurological:  Positive for headaches.        CONSTITUTIONAL:  No lethargy. No weakness. No unexpected weight loss. No appetite change.   EYES:  No pain, redness, or discharge. No loss of vision. No orbital trauma or pain.   ENT:  No tinnitus or decreased hearing. No epistaxis/purulent rhinorrhea. No voice change, airway closing, trismus.   CARDIOVASCULAR:  No chest pain. No skin mottling or pallor. No change in exertional capacity  RESPIRATORY:  No hemoptysis. No paroxysmal nocturnal dyspnea. No stridor. No audible wheezing. No production with cough.   GASTROINTESTINAL:  Normal appetite. No vomiting, diarrhea. No pain. No bloating. No  melena. No hematochezia.   GENITOURINARY:  No frequency, urgency, nocturia. No hematuria or dysuria. No discharge. No sores/adenopathy.   MUSCULOSKELETAL:  No arthralgias or myalgias that are new. No new deformity.   INTEGUMENTARY:  No swelling. No unexpected contusions. No abrasions. No lymphangitis.  NEUROLOGIC:  No meningismus. No new numbness of the extremities. No new focal weakness. No postural instability  PSYCHIATRIC:  No SI HI AVH  HEMATOLOGICAL:  No bleeding. No petechiae. No bruising.  ALLERGIES:  No urticaria. No sudden abd cramping. No stridor.    PE:     Physical exam highlights:   Physical Exam       Vitals:    03/03/24 0350   BP: 155/99   BP Location: Right arm   Pulse: 61   Resp: 16   Temp: 98 °F (36.7 °C)   TempSrc: Oral   SpO2: 99%     Vitals reviewed by me.   Nursing note reviewed  Chaperone present for all sensitive exam.  Const: No acute distress. Alert. Nontoxic. Not diaphoretic.    HEENT: External ears normal. No protrusion drainage swelling. Nose normal. No drainage/traumatic deformity. MMM. Mouth with baseline/symmetric movement. No trismus.   Eyes: No squinting. No icterus. No tearing/swelling/drainage. Tracks through the room with normal EOM.   Neck: ROM normal. No rigidity. No meningismus.  Cards: Rate as per vitals Compared to monitor sinus unless documented. Regular Well perfused.  Pulm: Effort and excursion normal. No distress. No audible wheezing/ stridor. Normal resp rate without retraction or change in work of breathing. CTAB  Abd: No distension beyond baseline. No fluctuant wave. Patient without peritoneal pain with shifting/bumping the bed.   MSK: ROM normal baseline. No deformity. No contractures from baseline.   Skin: No new rashes visible. Well perfused. No wounds visualized on exposed skin  Neuro: Nonfocal. Baseline. CN grossly intact. Moving all four with coordination.   Psych: Normal behavior and affect.        A:  - Nursing note reviewed.    Ddx and MDM  Considered  diagnoses  Flu so likely will tx  Doesn't want tamiflu after discussion of risks benefits    Sig other on steroids, we discuss use of steroids, he's not sure that he wants to use them, asks for a script to decide later    Otherwise symptomatic tx           My conversation with consultant reveals:        Decision rules:                           My read of the XR/CT scan reveals:     No orders to display       No orders of the defined types were placed in this encounter.    Labs Reviewed - No data to display    *Each of these labs was reviewed. Particular standout labs will be noted in the ED Course above     Final Diagnosis:  1. Influenza          P:  - hospital tx includes   Medications   naproxen (NAPROSYN) tablet 500 mg (500 mg Oral Given 3/3/24 0437)   acetaminophen (TYLENOL) tablet 975 mg (975 mg Oral Given 3/3/24 0437)   benzonatate (TESSALON PERLES) capsule 100 mg (100 mg Oral Given 3/3/24 0438)   dextromethorphan-guaiFENesin (ROBITUSSIN DM) oral syrup 10 mL (10 mL Oral Given 3/3/24 0437)   pseudoephedrine (SUDAFED) tablet 60 mg (60 mg Oral Given 3/3/24 0437)         - disposition  Time reflects when diagnosis was documented in both MDM as applicable and the Disposition within this note       Time User Action Codes Description Comment    3/3/2024  4:08 AM Ned Ocasio Add [J11.1] Influenza           ED Disposition       ED Disposition   Discharge    Condition   Stable    Date/Time   Sun Mar 3, 2024  4:08 AM    Comment   José Miguel Lazaro discharge to home/self care.                   Follow-up Information    None         - patient will call their PCP to let them know they were in the emergency department. We discuss return precautions and patient is agreeable with plan and aformentioned disposition.       - additional treatment intended, if consistent with primary provider:  - patient to follow with :      Discharge Medication List as of 3/3/2024  4:18 AM        START taking these medications    Details    !! acetaminophen (TYLENOL) 500 mg tablet Take 2 tablets (1,000 mg total) by mouth 2 (two) times a day, Starting Sun 3/3/2024, Normal      benzonatate (TESSALON PERLES) 100 mg capsule Take 1 capsule (100 mg total) by mouth 2 (two) times a day as needed for cough, Starting Sun 3/3/2024, Normal      Dextromethorphan Polistirex ER 30 MG/5ML SUER Take 10 mL (60 mg total) by mouth 2 (two) times a day, Starting Sun 3/3/2024, Normal      predniSONE 20 mg tablet Take 2 tablets (40 mg total) by mouth daily for 4 days, Starting Sun 3/3/2024, Until Thu 3/7/2024, Normal      pseudoephedrine (SUDAFED) 120 MG 12 hr tablet Take 1 tablet (120 mg total) by mouth 2 (two) times a day for 5 days, Starting Sun 3/3/2024, Until Fri 3/8/2024, Normal       !! - Potential duplicate medications found. Please discuss with provider.        CONTINUE these medications which have CHANGED    Details   naproxen (Naprosyn) 500 mg tablet Take 1 tablet (500 mg total) by mouth 2 (two) times a day with meals, Starting Sun 3/3/2024, Normal           CONTINUE these medications which have NOT CHANGED    Details   !! acetaminophen (TYLENOL) 325 mg tablet Take 650 mg by mouth every 6 (six) hours as needed for mild pain, Historical Med      atorvastatin (LIPITOR) 20 mg tablet Take 1 tablet (20 mg total) by mouth daily, Starting Mon 5/1/2023, Until Wed 5/31/2023, Normal      Biotin 1 MG CAPS Take by mouth, Historical Med      ciprofloxacin-dexamethasone (CIPRODEX) otic suspension Administer 4 drops into both ears 2 (two) times a day for 7 days, Starting Wed 9/13/2023, Until Wed 9/20/2023, Normal      Diclofenac Sodium (VOLTAREN) 1 % Apply 2 g topically 4 (four) times a day To affected area, Starting Tue 1/2/2024, Normal      famotidine (PEPCID) 20 mg tablet Take 1 tablet (20 mg total) by mouth daily, Starting Mon 5/1/2023, Normal      lidocaine (LIDODERM) 5 % APPLY 1 PATCH TO THE AFFECTED AREA DAILY. LEAVE ON FOR 12 HOURS AND THEN OFF FOR 12 HOURS., Normal       meloxicam (Mobic) 15 mg tablet Take 1 tablet (15 mg total) by mouth daily, Starting Tue 1/2/2024, Until Thu 2/1/2024, Normal      methocarbamol (ROBAXIN) 500 mg tablet Take 1 tablet (500 mg total) by mouth 2 (two) times a day as needed for muscle spasms, Starting Thu 1/11/2024, Until Sat 2/10/2024 at 2359, Normal      methylPREDNISolone 4 MG tablet therapy pack Use as directed on package, Normal      Multiple Vitamin (multivitamin) tablet Take 1 tablet by mouth daily, Historical Med      Nutritional Supplements (VITAMIN D BOOSTER PO) Take by mouth in the morning, Historical Med      Omega-3 Fatty Acids (FISH OIL CONCENTRATE PO) Take by mouth in the morning, Historical Med      valACYclovir (VALTREX) 1,000 mg tablet Take 2 tablets (2,000 mg total) by mouth 2 (two) times a day for 1 day, Starting Sun 10/30/2022, Until Mon 10/31/2022, Normal       !! - Potential duplicate medications found. Please discuss with provider.        No discharge procedures on file.  Prior to Admission Medications   Prescriptions Last Dose Informant Patient Reported? Taking?   Biotin 1 MG CAPS   Yes No   Sig: Take by mouth   Patient not taking: Reported on 2/22/2024   Diclofenac Sodium (VOLTAREN) 1 %   No No   Sig: Apply 2 g topically 4 (four) times a day To affected area   Multiple Vitamin (multivitamin) tablet   Yes No   Sig: Take 1 tablet by mouth daily   Nutritional Supplements (VITAMIN D BOOSTER PO)   Yes No   Sig: Take by mouth in the morning   Omega-3 Fatty Acids (FISH OIL CONCENTRATE PO)   Yes No   Sig: Take by mouth in the morning   acetaminophen (TYLENOL) 325 mg tablet   Yes No   Sig: Take 650 mg by mouth every 6 (six) hours as needed for mild pain   Patient not taking: Reported on 2/22/2024   atorvastatin (LIPITOR) 20 mg tablet   No No   Sig: Take 1 tablet (20 mg total) by mouth daily   ciprofloxacin-dexamethasone (CIPRODEX) otic suspension   No No   Sig: Administer 4 drops into both ears 2 (two) times a day for 7 days  "  famotidine (PEPCID) 20 mg tablet   No No   Sig: Take 1 tablet (20 mg total) by mouth daily   Patient not taking: Reported on 1/11/2024   lidocaine (LIDODERM) 5 %   No No   Sig: APPLY 1 PATCH TO THE AFFECTED AREA DAILY. LEAVE ON FOR 12 HOURS AND THEN OFF FOR 12 HOURS.   meloxicam (Mobic) 15 mg tablet   No No   Sig: Take 1 tablet (15 mg total) by mouth daily   methocarbamol (ROBAXIN) 500 mg tablet   No No   Sig: Take 1 tablet (500 mg total) by mouth 2 (two) times a day as needed for muscle spasms   methylPREDNISolone 4 MG tablet therapy pack   No No   Sig: Use as directed on package   naproxen (Naprosyn) 500 mg tablet   No No   Sig: Take 1 tablet (500 mg total) by mouth 2 (two) times a day with meals for 14 days   Patient not taking: Reported on 11/7/2023   valACYclovir (VALTREX) 1,000 mg tablet   No No   Sig: Take 2 tablets (2,000 mg total) by mouth 2 (two) times a day for 1 day      Facility-Administered Medications: None       Portions of the record may have been created with voice recognition software. Occasional wrong word or \"sound a like\" substitutions may have occurred due to the inherent limitations of voice recognition software. Read the chart carefully and recognize, using context, where substitutions have occurred.    Electronically signed by:  MD Ned Zuleta MD  03/04/24 0441    "

## 2024-03-06 ENCOUNTER — APPOINTMENT (EMERGENCY)
Dept: RADIOLOGY | Facility: HOSPITAL | Age: 49
End: 2024-03-06
Payer: COMMERCIAL

## 2024-03-06 ENCOUNTER — HOSPITAL ENCOUNTER (EMERGENCY)
Facility: HOSPITAL | Age: 49
Discharge: HOME | End: 2024-03-06
Attending: EMERGENCY MEDICINE
Payer: COMMERCIAL

## 2024-03-06 VITALS
RESPIRATION RATE: 20 BRPM | TEMPERATURE: 98.1 F | HEART RATE: 72 BPM | SYSTOLIC BLOOD PRESSURE: 167 MMHG | OXYGEN SATURATION: 99 % | DIASTOLIC BLOOD PRESSURE: 97 MMHG

## 2024-03-06 DIAGNOSIS — J10.1 INFLUENZA B: Primary | ICD-10-CM

## 2024-03-06 LAB
FLUAV RNA RESP QL NAA+PROBE: NEGATIVE
FLUBV RNA RESP QL NAA+PROBE: POSITIVE
RSV RNA RESP QL NAA+PROBE: NEGATIVE
S PYO DNA THROAT QL NAA+PROBE: NOT DETECTED
SARS-COV-2 RNA RESP QL NAA+PROBE: NEGATIVE

## 2024-03-06 PROCEDURE — 99283 EMERGENCY DEPT VISIT LOW MDM: CPT

## 2024-03-06 PROCEDURE — 87651 STREP A DNA AMP PROBE: CPT | Performed by: EMERGENCY MEDICINE

## 2024-03-06 PROCEDURE — 99284 EMERGENCY DEPT VISIT MOD MDM: CPT | Performed by: EMERGENCY MEDICINE

## 2024-03-06 PROCEDURE — 71045 X-RAY EXAM CHEST 1 VIEW: CPT

## 2024-03-06 PROCEDURE — 0241U HB NFCT DS VIR RESP RNA 4 TRGT: CPT | Performed by: EMERGENCY MEDICINE

## 2024-03-06 RX ORDER — OSELTAMIVIR PHOSPHATE 75 MG/1
75 CAPSULE ORAL EVERY 12 HOURS
Qty: 10 CAPSULE | Refills: 0 | Status: SHIPPED | OUTPATIENT
Start: 2024-03-06 | End: 2024-03-11

## 2024-03-06 RX ORDER — DIPHENHYDRAMINE HYDROCHLORIDE AND LIDOCAINE HYDROCHLORIDE AND ALUMINUM HYDROXIDE AND MAGNESIUM HYDRO
10 KIT EVERY 4 HOURS PRN
Qty: 119 ML | Refills: 0 | Status: SHIPPED | OUTPATIENT
Start: 2024-03-06 | End: 2024-03-09

## 2024-03-06 RX ORDER — METOCLOPRAMIDE 10 MG/1
10 TABLET ORAL EVERY 6 HOURS
Qty: 10 TABLET | Refills: 0 | Status: SHIPPED | OUTPATIENT
Start: 2024-03-06 | End: 2024-03-09

## 2024-03-06 NOTE — ED PROVIDER NOTES
History  Chief Complaint   Patient presents with    Sore Throat     C/o sore throat, chest congestion and hiccoughs  for a couple of days     48-year-old male presents with cough body aches sore throat for the past couple days also hiccups denies any fevers chills nausea vomiting vomiting diarrhea or any other symptoms      History provided by:  Patient   used: No        Prior to Admission Medications   Prescriptions Last Dose Informant Patient Reported? Taking?   Biotin 1 MG CAPS   Yes No   Sig: Take by mouth   Patient not taking: Reported on 2/22/2024   Dextromethorphan Polistirex ER 30 MG/5ML SUER   No No   Sig: Take 10 mL (60 mg total) by mouth 2 (two) times a day   Diclofenac Sodium (VOLTAREN) 1 %   No No   Sig: Apply 2 g topically 4 (four) times a day To affected area   Multiple Vitamin (multivitamin) tablet   Yes No   Sig: Take 1 tablet by mouth daily   Nutritional Supplements (VITAMIN D BOOSTER PO)   Yes No   Sig: Take by mouth in the morning   Omega-3 Fatty Acids (FISH OIL CONCENTRATE PO)   Yes No   Sig: Take by mouth in the morning   acetaminophen (TYLENOL) 325 mg tablet   Yes No   Sig: Take 650 mg by mouth every 6 (six) hours as needed for mild pain   Patient not taking: Reported on 2/22/2024   acetaminophen (TYLENOL) 500 mg tablet   No No   Sig: Take 2 tablets (1,000 mg total) by mouth 2 (two) times a day   atorvastatin (LIPITOR) 20 mg tablet   No No   Sig: Take 1 tablet (20 mg total) by mouth daily   benzonatate (TESSALON PERLES) 100 mg capsule   No No   Sig: Take 1 capsule (100 mg total) by mouth 2 (two) times a day as needed for cough   ciprofloxacin-dexamethasone (CIPRODEX) otic suspension   No No   Sig: Administer 4 drops into both ears 2 (two) times a day for 7 days   famotidine (PEPCID) 20 mg tablet   No No   Sig: Take 1 tablet (20 mg total) by mouth daily   Patient not taking: Reported on 1/11/2024   lidocaine (LIDODERM) 5 %   No No   Sig: APPLY 1 PATCH TO THE AFFECTED AREA  DAILY. LEAVE ON FOR 12 HOURS AND THEN OFF FOR 12 HOURS.   meloxicam (Mobic) 15 mg tablet   No No   Sig: Take 1 tablet (15 mg total) by mouth daily   methocarbamol (ROBAXIN) 500 mg tablet   No No   Sig: Take 1 tablet (500 mg total) by mouth 2 (two) times a day as needed for muscle spasms   methylPREDNISolone 4 MG tablet therapy pack   No No   Sig: Use as directed on package   naproxen (Naprosyn) 500 mg tablet   No No   Sig: Take 1 tablet (500 mg total) by mouth 2 (two) times a day with meals for 14 days   Patient not taking: Reported on 11/7/2023   naproxen (Naprosyn) 500 mg tablet   No No   Sig: Take 1 tablet (500 mg total) by mouth 2 (two) times a day with meals   predniSONE 20 mg tablet   No No   Sig: Take 2 tablets (40 mg total) by mouth daily for 4 days   pseudoephedrine (SUDAFED) 120 MG 12 hr tablet   No No   Sig: Take 1 tablet (120 mg total) by mouth 2 (two) times a day for 5 days   valACYclovir (VALTREX) 1,000 mg tablet   No No   Sig: Take 2 tablets (2,000 mg total) by mouth 2 (two) times a day for 1 day      Facility-Administered Medications: None       Past Medical History:   Diagnosis Date    Anemia     GERD (gastroesophageal reflux disease)        Past Surgical History:   Procedure Laterality Date    DENTAL SURGERY      NO PAST SURGERIES      WISDOM TOOTH EXTRACTION         Family History   Problem Relation Age of Onset    Diabetes Mother     Diabetes Father     Cancer Father     Diabetes Paternal Aunt      I have reviewed and agree with the history as documented.    E-Cigarette/Vaping    E-Cigarette Use Current Every Day User      E-Cigarette/Vaping Substances    Nicotine Yes     THC No     CBD Yes     Flavoring No     Other No     Unknown No      Social History     Tobacco Use    Smoking status: Every Day     Types: E-Cigarettes    Smokeless tobacco: Never    Tobacco comments:     vapes, used to smoke cigarretes 0.25 ppd for 15-20 years   Vaping Use    Vaping status: Every Day    Substances: Nicotine,  CBD   Substance Use Topics    Alcohol use: Yes     Alcohol/week: 7.0 standard drinks of alcohol     Types: 7 Standard drinks or equivalent per week     Comment: varies. maybe five shooters a day, last drink couple days ago    Drug use: Not Currently       Review of Systems   Constitutional:  Positive for chills.   HENT:  Positive for congestion.    Eyes: Negative.    Respiratory:  Positive for cough.    Cardiovascular: Negative.    Gastrointestinal: Negative.    Endocrine: Negative.    Genitourinary: Negative.    Musculoskeletal:  Positive for arthralgias and myalgias.   Skin: Negative.    Allergic/Immunologic: Negative.    Neurological: Negative.    Hematological: Negative.    Psychiatric/Behavioral: Negative.     All other systems reviewed and are negative.      Physical Exam  Physical Exam  Vitals and nursing note reviewed.   Constitutional:       Appearance: Normal appearance.   HENT:      Head: Normocephalic and atraumatic.      Nose: Nose normal.      Mouth/Throat:      Mouth: Mucous membranes are moist.   Eyes:      Extraocular Movements: Extraocular movements intact.      Pupils: Pupils are equal, round, and reactive to light.   Cardiovascular:      Rate and Rhythm: Normal rate and regular rhythm.   Pulmonary:      Effort: Pulmonary effort is normal.      Breath sounds: Normal breath sounds.   Abdominal:      General: Abdomen is flat. Bowel sounds are normal.      Palpations: Abdomen is soft.   Musculoskeletal:         General: Normal range of motion.      Cervical back: Normal range of motion and neck supple.   Skin:     General: Skin is warm.      Capillary Refill: Capillary refill takes less than 2 seconds.   Neurological:      General: No focal deficit present.      Mental Status: He is alert and oriented to person, place, and time. Mental status is at baseline.   Psychiatric:         Mood and Affect: Mood normal.         Thought Content: Thought content normal.         Vital Signs  ED Triage Vitals  [03/06/24 1711]   Temperature Pulse Respirations Blood Pressure SpO2   98.1 °F (36.7 °C) 72 20 167/97 99 %      Temp Source Heart Rate Source Patient Position - Orthostatic VS BP Location FiO2 (%)   Tympanic Monitor Sitting Right arm --      Pain Score       10 - Worst Possible Pain           Vitals:    03/06/24 1711   BP: 167/97   Pulse: 72   Patient Position - Orthostatic VS: Sitting         Visual Acuity      ED Medications  Medications - No data to display    Diagnostic Studies  Results Reviewed       Procedure Component Value Units Date/Time    FLU/RSV/COVID - if FLU/RSV clinically relevant [[id number]]  (Abnormal) Collected: 03/06/24 1721    Lab Status: Final result Specimen: Nares from Nose Updated: 03/06/24 1807     SARS-CoV-2 Negative     INFLUENZA A PCR Negative     INFLUENZA B PCR Positive     RSV PCR Negative    Narrative:      FOR PEDIATRIC PATIENTS - copy/paste COVID Guidelines URL to browser: [email or web address]    SARS-CoV-2 assay is a Nucleic Acid Amplification assay intended for the  qualitative detection of nucleic acid from SARS-CoV-2 in nasopharyngeal  swabs. Results are for the presumptive identification of SARS-CoV-2 RNA.    Positive results are indicative of infection with SARS-CoV-2, the virus  causing COVID-19, but do not rule out bacterial infection or co-infection  with other viruses. Laboratories within the [place] and its  territories are required to report all positive results to the appropriate  public health authorities. Negative results do not preclude SARS-CoV-2  infection and should not be used as the sole basis for treatment or other  patient management decisions. Negative results must be combined with  clinical observations, patient history, and epidemiological information.  This test has not been FDA cleared or approved.    This test has been authorized by FDA under an Emergency Use Authorization  (EUA). This test is  only authorized for the duration of time the  declaration that circumstances exist justifying the authorization of the  emergency use of an in vitro diagnostic tests for detection of SARS-CoV-2  virus and/or diagnosis of COVID-19 infection under section 564(b)(1) of  the Act, 21 U.S.C. 360bbb-3(b)(1), unless the authorization is terminated  or revoked sooner. The test has been validated but independent review by FDA  and CLIA is pending.    Test performed using [employer]: This RT-PCR assay targets N2,  a region unique to SARS-CoV-2. A conserved region in the E-gene was chosen  for pan-Sarbecovirus detection which includes SARS-CoV-2.    According to CMS-2020-01-R, this platform meets the definition of high-throughput technology.    Strep A PCR [[id number]]  (Normal) Collected: 03/06/24 1721    Lab Status: Final result Specimen: Throat Updated: 03/06/24 1754     STREP A PCR Not Detected                   XR chest 1 view portable    (Results Pending)              Procedures  Procedures         ED Course                                             Medical Decision Making  Patient evaluated in the ED with labs discharged with follow-up and medications he understood he verbalized no questions at time of discharge    Problems Addressed:  Influenza B: acute illness or injury    Amount and/or Complexity of Data Reviewed  External Data Reviewed: notes.  Labs: ordered. Decision-making details documented in ED Course.  Radiology: ordered and independent interpretation performed. Decision-making details documented in ED Course.     Details: No acute disease    Risk  Prescription drug management.             Disposition  Final diagnoses:   Influenza B     Time reflects when diagnosis was documented in both MDM as applicable and the Disposition within this note       Time User Action Codes Description Comment    3/6/2024  6:13 PM Deena Friedman Add [J10.1] Influenza B           ED Disposition       ED Disposition    Discharge    Condition   Stable    Date/Time   Wed Mar 6, 2024  6:13 PM    Comment   José Miguel Lazaro discharge to home/self care.                   Follow-up Information       Follow up With Specialties Details Why Contact Info Additional Information    [facility] Emergency Medicine  If symptoms worsen [address] [facility] [address]  [phone] [facility], [address], [place], [address]            Discharge Medication List as of 3/6/2024  6:14 PM        START taking these medications    Details   diphenhydramine, lidocaine, Al/Mg hydroxide, simethicone (Magic Mouthwash) SUSP Swish and swallow 10 mL every 4 (four) hours as needed for mouth pain or discomfort for up to 3 days, Starting Wed 3/6/2024, Until Sat 3/9/2024 at 2359, Normal      metoclopramide (REGLAN) 10 mg tablet Take 1 tablet (10 mg total) by mouth every 6 (six) hours for 3 days, Starting Wed 3/6/2024, Until Sat 3/9/2024, Normal      oseltamivir (TAMIFLU) 75 mg capsule Take 1 capsule (75 mg total) by mouth every 12 (twelve) hours for 5 days, Starting Wed 3/6/2024, Until Mon 3/11/2024, Normal           CONTINUE these medications which have NOT CHANGED    Details   !! acetaminophen (TYLENOL) 325 mg tablet Take 650 mg by mouth every 6 (six) hours as needed for mild pain, Historical Med      !! acetaminophen (TYLENOL) 500 mg tablet Take 2 tablets (1,000 mg total) by mouth 2 (two) times a day, Starting Sun 3/3/2024, Normal      atorvastatin (LIPITOR) 20 mg tablet Take 1 tablet (20 mg total) by mouth daily, Starting Mon 5/1/2023, Until Wed 5/31/2023, Normal      benzonatate (TESSALON PERLES) 100 mg capsule Take 1 capsule (100 mg total) by mouth 2 (two) times a day as needed for cough, Starting Sun 3/3/2024, Normal      Biotin 1 MG CAPS Take by mouth, Historical Med      ciprofloxacin-dexamethasone (CIPRODEX) otic suspension Administer 4 drops into  both ears 2 (two) times a day for 7 days, Starting Wed 9/13/2023, Until Wed 9/20/2023, Normal      Dextromethorphan Polistirex ER 30 MG/5ML SUER Take 10 mL (60 mg total) by mouth 2 (two) times a day, Starting Sun 3/3/2024, Normal      Diclofenac Sodium (VOLTAREN) 1 % Apply 2 g topically 4 (four) times a day To affected area, Starting Tue 1/2/2024, Normal      famotidine (PEPCID) 20 mg tablet Take 1 tablet (20 mg total) by mouth daily, Starting Mon 5/1/2023, Normal      lidocaine (LIDODERM) 5 % APPLY 1 PATCH TO THE AFFECTED AREA DAILY. LEAVE ON FOR 12 HOURS AND THEN OFF FOR 12 HOURS., Normal      meloxicam (Mobic) 15 mg tablet Take 1 tablet (15 mg total) by mouth daily, Starting Tue 1/2/2024, Until Thu 2/1/2024, Normal      methocarbamol (ROBAXIN) 500 mg tablet Take 1 tablet (500 mg total) by mouth 2 (two) times a day as needed for muscle spasms, Starting Thu 1/11/2024, Until Sat 2/10/2024 at 2359, Normal      methylPREDNISolone 4 MG tablet therapy pack Use as directed on package, Normal      Multiple Vitamin (multivitamin) tablet Take 1 tablet by mouth daily, Historical Med      naproxen (Naprosyn) 500 mg tablet Take 1 tablet (500 mg total) by mouth 2 (two) times a day with meals, Starting Sun 3/3/2024, Normal      Nutritional Supplements (VITAMIN D BOOSTER PO) Take by mouth in the morning, Historical Med      Omega-3 Fatty Acids (FISH OIL CONCENTRATE PO) Take by mouth in the morning, Historical Med      predniSONE 20 mg tablet Take 2 tablets (40 mg total) by mouth daily for 4 days, Starting Sun 3/3/2024, Until Thu 3/7/2024, Normal      pseudoephedrine (SUDAFED) 120 MG 12 hr tablet Take 1 tablet (120 mg total) by mouth 2 (two) times a day for 5 days, Starting Sun 3/3/2024, Until Fri 3/8/2024, Normal      valACYclovir (VALTREX) 1,000 mg tablet Take 2 tablets (2,000 mg total) by mouth 2 (two) times a day for 1 day, Starting Sun 10/30/2022, Until Mon 10/31/2022, Normal       !! - Potential duplicate medications found.  Please discuss with provider.          No discharge procedures on file.    PDMP Review       None            ED Provider  Electronically Signed by             Deena Friedman DO  03/06/24 [id number]

## 2024-03-28 ENCOUNTER — HOSPITAL ENCOUNTER (EMERGENCY)
Facility: HOSPITAL | Age: 49
Discharge: HOME | End: 2024-03-28
Attending: STUDENT IN AN ORGANIZED HEALTH CARE EDUCATION/TRAINING PROGRAM
Payer: COMMERCIAL

## 2024-03-28 VITALS
BODY MASS INDEX: 26.67 KG/M2 | WEIGHT: 176 LBS | HEART RATE: 67 BPM | SYSTOLIC BLOOD PRESSURE: 130 MMHG | DIASTOLIC BLOOD PRESSURE: 87 MMHG | RESPIRATION RATE: 20 BRPM | OXYGEN SATURATION: 98 % | TEMPERATURE: 98 F | HEIGHT: 68 IN

## 2024-03-28 DIAGNOSIS — M62.838 TRAPEZIUS MUSCLE SPASM: Primary | ICD-10-CM

## 2024-03-28 DIAGNOSIS — M43.6 TORTICOLLIS: ICD-10-CM

## 2024-03-28 PROCEDURE — 96372 THER/PROPH/DIAG INJ SC/IM: CPT

## 2024-03-28 PROCEDURE — 99284 EMERGENCY DEPT VISIT MOD MDM: CPT | Performed by: PHYSICIAN ASSISTANT

## 2024-03-28 PROCEDURE — 99283 EMERGENCY DEPT VISIT LOW MDM: CPT

## 2024-03-28 RX ORDER — LIDOCAINE 50 MG/G
1 PATCH TOPICAL ONCE
Status: DISCONTINUED | OUTPATIENT
Start: 2024-03-28 | End: 2024-03-28 | Stop reason: HOSPADM

## 2024-03-28 RX ORDER — KETOROLAC TROMETHAMINE 30 MG/ML
15 INJECTION, SOLUTION INTRAMUSCULAR; INTRAVENOUS ONCE
Status: COMPLETED | OUTPATIENT
Start: 2024-03-28 | End: 2024-03-28

## 2024-03-28 RX ORDER — NAPROXEN 500 MG/1
500 TABLET ORAL 2 TIMES DAILY WITH MEALS
Qty: 14 TABLET | Refills: 0 | Status: SHIPPED | OUTPATIENT
Start: 2024-03-28 | End: 2024-04-04

## 2024-03-28 RX ORDER — CYCLOBENZAPRINE HCL 10 MG
10 TABLET ORAL 3 TIMES DAILY PRN
Qty: 12 TABLET | Refills: 0 | Status: SHIPPED | OUTPATIENT
Start: 2024-03-28 | End: 2024-04-01

## 2024-03-28 RX ADMIN — LIDOCAINE 1 PATCH: 50 PATCH TOPICAL at 09:59

## 2024-03-28 RX ADMIN — KETOROLAC TROMETHAMINE 15 MG: 30 INJECTION, SOLUTION INTRAMUSCULAR; INTRAVENOUS at 09:58

## 2024-03-28 NOTE — Clinical Note
José Miguel Lazaro was seen and treated in our emergency department on 3/28/2024.            may return sooner should symptoms subside    Diagnosis: cervical muscle spasm    José Miguel  may return to work on return date.    He may return on this date: 04/01/2024         If you have any questions or concerns, please don't hesitate to call.      Marine Meyer PA-C    ______________________________           _______________          _______________  Hospital Representative                              Date                                Time

## 2024-03-28 NOTE — ED PROVIDER NOTES
History  Chief Complaint   Patient presents with    Neck Pain     Pt woke this morning with left sided neck pain that has not resolved. Pain is constant but intermittently becomes sharp. Pt took no meds for pain.     49-year-old male presenting today with left-sided neck pain that began upon waking up this morning, pain is nonradiating however spasm-like.  Relates that he has had this before in the past however this seems more severe.  States that his daughter slept in the same bed as him last evening and caused him to be sleeping in a weird position.  He has decreased range of motion of the neck, pain worsens with any type of movement.  Denies numbness, paresthesias, chest pain, headache, changes in vision weakness.  Differential includes but is not limited to muscle strain or spasm etc.        Prior to Admission Medications   Prescriptions Last Dose Informant Patient Reported? Taking?   Biotin 1 MG CAPS   Yes No   Sig: Take by mouth   Patient not taking: Reported on 2/22/2024   Dextromethorphan Polistirex ER 30 MG/5ML SUER   No No   Sig: Take 10 mL (60 mg total) by mouth 2 (two) times a day   Diclofenac Sodium (VOLTAREN) 1 %   No No   Sig: Apply 2 g topically 4 (four) times a day To affected area   Multiple Vitamin (multivitamin) tablet   Yes No   Sig: Take 1 tablet by mouth daily   Nutritional Supplements (VITAMIN D BOOSTER PO)   Yes No   Sig: Take by mouth in the morning   Omega-3 Fatty Acids (FISH OIL CONCENTRATE PO)   Yes No   Sig: Take by mouth in the morning   acetaminophen (TYLENOL) 325 mg tablet   Yes No   Sig: Take 650 mg by mouth every 6 (six) hours as needed for mild pain   Patient not taking: Reported on 2/22/2024   acetaminophen (TYLENOL) 500 mg tablet   No No   Sig: Take 2 tablets (1,000 mg total) by mouth 2 (two) times a day   atorvastatin (LIPITOR) 20 mg tablet   No No   Sig: Take 1 tablet (20 mg total) by mouth daily   benzonatate (TESSALON PERLES) 100 mg capsule   No No   Sig: Take 1 capsule (100  mg total) by mouth 2 (two) times a day as needed for cough   ciprofloxacin-dexamethasone (CIPRODEX) otic suspension   No No   Sig: Administer 4 drops into both ears 2 (two) times a day for 7 days   famotidine (PEPCID) 20 mg tablet   No No   Sig: Take 1 tablet (20 mg total) by mouth daily   Patient not taking: Reported on 1/11/2024   lidocaine (LIDODERM) 5 %   No No   Sig: APPLY 1 PATCH TO THE AFFECTED AREA DAILY. LEAVE ON FOR 12 HOURS AND THEN OFF FOR 12 HOURS.   meloxicam (Mobic) 15 mg tablet   No No   Sig: Take 1 tablet (15 mg total) by mouth daily   methocarbamol (ROBAXIN) 500 mg tablet   No No   Sig: Take 1 tablet (500 mg total) by mouth 2 (two) times a day as needed for muscle spasms   methylPREDNISolone 4 MG tablet therapy pack   No No   Sig: Use as directed on package   naproxen (Naprosyn) 500 mg tablet   No No   Sig: Take 1 tablet (500 mg total) by mouth 2 (two) times a day with meals for 14 days   Patient not taking: Reported on 11/7/2023   naproxen (Naprosyn) 500 mg tablet   No No   Sig: Take 1 tablet (500 mg total) by mouth 2 (two) times a day with meals   pseudoephedrine (SUDAFED) 120 MG 12 hr tablet   No No   Sig: Take 1 tablet (120 mg total) by mouth 2 (two) times a day for 5 days   valACYclovir (VALTREX) 1,000 mg tablet   No No   Sig: Take 2 tablets (2,000 mg total) by mouth 2 (two) times a day for 1 day      Facility-Administered Medications: None       Past Medical History:   Diagnosis Date    Anemia     GERD (gastroesophageal reflux disease)        Past Surgical History:   Procedure Laterality Date    DENTAL SURGERY      NO PAST SURGERIES      WISDOM TOOTH EXTRACTION         Family History   Problem Relation Age of Onset    Diabetes Mother     Diabetes Father     Cancer Father     Diabetes Paternal Aunt      I have reviewed and agree with the history as documented.    E-Cigarette/Vaping    E-Cigarette Use Current Every Day User      E-Cigarette/Vaping Substances    Nicotine Yes     THC No     CBD  Yes     Flavoring No     Other No     Unknown No      Social History     Tobacco Use    Smoking status: Every Day     Types: E-Cigarettes    Smokeless tobacco: Never    Tobacco comments:     vapes, used to smoke cigarretes 0.25 ppd for 15-20 years   Vaping Use    Vaping status: Every Day    Substances: Nicotine, CBD   Substance Use Topics    Alcohol use: Yes     Alcohol/week: 7.0 standard drinks of alcohol     Types: 7 Standard drinks or equivalent per week     Comment: varies. maybe five shooters a day, last drink couple days ago    Drug use: Not Currently       Review of Systems   Constitutional: Negative.  Negative for chills, fever and unexpected weight change.        Denies IV drug use     HENT: Negative.     Eyes: Negative.    Respiratory: Negative.  Negative for cough, chest tightness, shortness of breath and wheezing.    Cardiovascular: Negative.  Negative for chest pain and palpitations.   Gastrointestinal: Negative.  Negative for abdominal pain, constipation, diarrhea, nausea and vomiting.   Genitourinary: Negative.  Negative for difficulty urinating, dysuria, flank pain, frequency, hematuria and urgency.        Denies numbness, tingling in the groin.    Musculoskeletal:  Positive for neck pain. Negative for arthralgias, back pain, gait problem, joint swelling, myalgias and neck stiffness.   Skin: Negative.  Negative for color change.   Neurological: Negative.  Negative for dizziness, tremors, weakness, light-headedness, numbness and headaches.   All other systems reviewed and are negative.      Physical Exam  Physical Exam  Vitals and nursing note reviewed.   Constitutional:       Appearance: Normal appearance.   HENT:      Head: Normocephalic and atraumatic.      Right Ear: External ear normal.      Left Ear: External ear normal.      Nose: Nose normal.   Eyes:      Conjunctiva/sclera: Conjunctivae normal.   Cardiovascular:      Rate and Rhythm: Normal rate.      Pulses: Normal pulses.   Pulmonary:       Effort: Pulmonary effort is normal.   Abdominal:      General: There is no distension.   Musculoskeletal:         General: No deformity. Normal range of motion.        Arms:       Cervical back: Normal range of motion.   Skin:     General: Skin is warm and dry.      Capillary Refill: Capillary refill takes less than 2 seconds.      Findings: No rash.   Neurological:      General: No focal deficit present.      Mental Status: He is alert and oriented to person, place, and time. Mental status is at baseline.   Psychiatric:         Mood and Affect: Mood normal.         Behavior: Behavior normal.         Thought Content: Thought content normal.         Judgment: Judgment normal.         Vital Signs  ED Triage Vitals [03/28/24 0912]   Temperature Pulse Respirations Blood Pressure SpO2   98 °F (36.7 °C) 67 20 130/87 98 %      Temp Source Heart Rate Source Patient Position - Orthostatic VS BP Location FiO2 (%)   Oral Monitor Sitting Right arm --      Pain Score       10 - Worst Possible Pain           Vitals:    03/28/24 0912   BP: 130/87   Pulse: 67   Patient Position - Orthostatic VS: Sitting         Visual Acuity      ED Medications  Medications   ketorolac (TORADOL) injection 15 mg (has no administration in time range)   lidocaine (LIDODERM) 5 % patch 1 patch (has no administration in time range)       Diagnostic Studies  Results Reviewed       None                   No orders to display              Procedures  Procedures         ED Course                                             Medical Decision Making  Symptoms and presentation most consistent with cervical muscle spasm/torticollis etc.  Advised heat, gentle range of motion exercises/stretching as well as muscle relaxants and NSAIDs, informed not to drive or operate machinery with taking flexeril.     Patient is informed to return to the emergency department for worsening of symptoms and was given proper education regarding their diagnosis and symptoms. Otherwise  the patient is informed to follow up with their primary care doctor for re-evaluation. The patient verbalizes understanding and agrees with above assessment and plan. All questions were answered.          Risk  Prescription drug management.             Disposition  Final diagnoses:   Trapezius muscle spasm   Torticollis     Time reflects when diagnosis was documented in both MDM as applicable and the Disposition within this note       Time User Action Codes Description Comment    3/28/2024  9:19 AM Marine Meyer [M62.838] Trapezius muscle spasm     3/28/2024  9:19 AM Marine Meyer [M43.6] Torticollis           ED Disposition       ED Disposition   Discharge    Condition   Stable    Date/Time   Thu Mar 28, 2024  9:19 AM    Comment   José Miguel Lazaro discharge to home/self care.                   Follow-up Information       Follow up With Specialties Details Why Contact Info Additional Information    [facility] Emergency Medicine Go to  If symptoms worsen, otherwise please follow up with your family doctor [address] [place] [id number] [phone] [facility], [address] [place] [address], [phone]            Patient's Medications   Discharge Prescriptions    CYCLOBENZAPRINE (FLEXERIL) 10 MG TABLET    Take 1 tablet (10 mg total) by mouth 3 (three) times a day as needed for muscle spasms for up to 4 days       Start Date: 3/28/2024 End Date: 4/1/2024       Order Dose: 10 mg       Quantity: 12 tablet    Refills: 0    NAPROXEN (NAPROSYN) 500 MG TABLET    Take 1 tablet (500 mg total) by mouth 2 (two) times a day with meals for 7 days       Start Date: 3/28/2024 End Date: 4/4/2024       Order Dose: 500 mg       Quantity: 14 tablet    Refills: 0       No discharge procedures on file.    PDMP Review       None            ED Provider  Electronically Signed by             Marine Meyer PA-C  03/28/24  3715

## 2024-04-05 ENCOUNTER — OFFICE VISIT (OUTPATIENT)
Age: 49
End: 2024-04-05

## 2024-04-05 VITALS
TEMPERATURE: 98.1 F | DIASTOLIC BLOOD PRESSURE: 80 MMHG | HEART RATE: 68 BPM | OXYGEN SATURATION: 100 % | SYSTOLIC BLOOD PRESSURE: 138 MMHG

## 2024-04-05 DIAGNOSIS — H53.8 BLURRY VISION: Primary | ICD-10-CM

## 2024-04-05 PROCEDURE — 99213 OFFICE O/P EST LOW 20 MIN: CPT | Performed by: FAMILY MEDICINE

## 2024-04-07 NOTE — PROGRESS NOTES
Assessment/Plan:      Diagnoses and all orders for this visit:    Blurry vision  -Patient has been to the eye doctor several times glasses and history of glasses revised.  - Patient still has blurry vision patient is concerned because he was told he was prediabetic but it may be in relationship to his blood sugar  - Exam including limited fundus exam does not show conclusive findings, patient encouraged to return for iris exam to better protect to rest possibility of retinopathy  -Also cannot exclude possible glaucoma family history is nonspecific/limited        Subjective:     Patient ID: José Miguel Lazaro is a 49 y.o. male.    HPI  Patient has been to the eye doctor several times glasses and history of glasses revised. Patient still has blurry vision patient is concerned because he was told he was prediabetic but it may be in relationship to his blood sugar. Also cannot exclude possible glaucoma family history is nonspecific/limited      Review of Systems   Constitutional:  Negative for chills and fever.   HENT:  Negative for ear pain and sore throat.    Eyes:  Positive for visual disturbance. Negative for pain.   Respiratory:  Negative for cough and shortness of breath.    Cardiovascular:  Negative for chest pain and palpitations.   Gastrointestinal:  Negative for abdominal pain and vomiting.   Genitourinary:  Negative for dysuria and hematuria.   Musculoskeletal:  Negative for arthralgias and back pain.   Skin:  Negative for color change and rash.   Neurological:  Negative for seizures and syncope.   All other systems reviewed and are negative.        Objective:     Physical Exam  Constitutional:       General: He is not in acute distress.     Appearance: Normal appearance. He is not toxic-appearing.   HENT:      Head: Normocephalic and atraumatic.      Nose: Nose normal.   Eyes:      Pupils: Pupils are equal, round, and reactive to light.   Cardiovascular:      Rate and Rhythm: Normal rate and regular rhythm.    Pulmonary:      Effort: Pulmonary effort is normal.   Abdominal:      Palpations: Abdomen is soft.      Tenderness: There is no abdominal tenderness.   Musculoskeletal:         General: No deformity.   Skin:     General: Skin is warm and dry.   Neurological:      Mental Status: He is alert and oriented to person, place, and time.   Psychiatric:         Mood and Affect: Mood normal.

## 2024-05-15 ENCOUNTER — TELEPHONE (OUTPATIENT)
Age: 49
End: 2024-05-15

## 2024-05-15 NOTE — TELEPHONE ENCOUNTER
Called patient to go over wait list and verify his new rescheduled appointment. Patient understood the wait list thru my chart.

## 2024-05-15 NOTE — TELEPHONE ENCOUNTER
Phone call from patient's wife to cancel New Patient appt on 5/16/24 (patient missed his flight). Patient with Pre Diabetes having vision problems. Scheduled patient for next avail appt in August. Please review schedule, and see if patient can be accommodated sooner. Patient has [facility] [place] MA, and can only be seen in [place].

## 2024-06-28 ENCOUNTER — OFFICE VISIT (OUTPATIENT)
Age: 49
End: 2024-06-28

## 2024-06-28 ENCOUNTER — TELEPHONE (OUTPATIENT)
Age: 49
End: 2024-06-28

## 2024-06-28 VITALS
TEMPERATURE: 97.6 F | HEART RATE: 68 BPM | OXYGEN SATURATION: 98 % | SYSTOLIC BLOOD PRESSURE: 128 MMHG | WEIGHT: 174 LBS | DIASTOLIC BLOOD PRESSURE: 78 MMHG | HEIGHT: 68 IN | BODY MASS INDEX: 26.37 KG/M2

## 2024-06-28 DIAGNOSIS — M25.562 LEFT KNEE PAIN, UNSPECIFIED CHRONICITY: ICD-10-CM

## 2024-06-28 DIAGNOSIS — M65.832 EXTENSOR TENOSYNOVITIS OF LEFT WRIST: ICD-10-CM

## 2024-06-28 DIAGNOSIS — R73.03 PREDIABETES: ICD-10-CM

## 2024-06-28 DIAGNOSIS — M25.812 IMPINGEMENT OF LEFT SHOULDER: ICD-10-CM

## 2024-06-28 DIAGNOSIS — M54.50 BILATERAL LOW BACK PAIN WITHOUT SCIATICA, UNSPECIFIED CHRONICITY: Chronic | ICD-10-CM

## 2024-06-28 DIAGNOSIS — M54.50 LUMBAR BACK PAIN: Primary | ICD-10-CM

## 2024-06-28 PROCEDURE — 99213 OFFICE O/P EST LOW 20 MIN: CPT | Performed by: FAMILY MEDICINE

## 2024-06-28 RX ORDER — METHOCARBAMOL 500 MG/1
500 TABLET, FILM COATED ORAL 2 TIMES DAILY PRN
Qty: 60 TABLET | Refills: 0 | Status: SHIPPED | OUTPATIENT
Start: 2024-06-28 | End: 2024-07-28

## 2024-06-28 RX ORDER — NEOMYCIN SULFATE, POLYMYXIN B SULFATE, AND DEXAMETHASONE 3.5; 10000; 1 MG/G; [USP'U]/G; MG/G
OINTMENT OPHTHALMIC
COMMUNITY
Start: 2024-06-03

## 2024-06-28 NOTE — PROGRESS NOTES
Ambulatory Visit  Name: José Miguel Lazaro      : 1975      MRN: [id number]  Encounter Provider: Ines Oliveira MD  Encounter Date: 2024   Encounter department: [facility]    Assessment & Plan   1. Lumbar back pain  Assessment & Plan:  Patient presents to the clinic for follow-up on his back pain.  He was seen by pain medicine twice so far and has been on Flexeril, Robaxin, Lidoderm patches, Mobic, Voltaren gel and Medrol Dosepak.  He states that these medications did help to a certain extent with his pain especially the Medrol Dosepak.  He did try physical therapy for a few months in the past.   The pain is worse immediately in the morning after waking up and is worse on bending forwards.  He states that it is a sharp aching pain and rates it as a 7 out of 10.   He had MRI of the lumbar spine done which showed disc bulge at L4-L5 and L5-S1 with mild foraminal narrowing at L5-S1.  XR spine was unremarkable  No red flag symptoms of back pain present    Plan  Continuation of usual activity, minimize bedrest, apply a heating pad to the back  Continue with short course of Medrol Dosepak and Flexeril as needed for pain  Tylenol as needed for pain, no NSAIDs (Advil, Aleve, Motrin) while taking steroids due to increased risk of GI side effects   Continue Voltaren gel as needed for pain  Follow-up with pain medicine  Advised gentle massage and stretches as tolerated   Gave patient handout about chronic low back pain management and back exercises to be done at home  Gave referral to physical therapy; discussed with patient to call physical therapy again  Follow-up in 1 month for acupuncture in the clinic  Orders:  -     Ambulatory Referral to Physical Therapy; Future  2. Prediabetes  3. Bilateral low back pain without sciatica, unspecified chronicity  -     methocarbamol (ROBAXIN) 500 mg tablet; Take 1 tablet (500 mg total) by mouth 2 (two) times a day as needed for  muscle spasms  4. Extensor tenosynovitis of left wrist  -     Diclofenac Sodium (VOLTAREN) 1 %; Apply 2 g topically 4 (four) times a day To affected area  5. Impingement of left shoulder  -     Diclofenac Sodium (VOLTAREN) 1 %; Apply 2 g topically 4 (four) times a day To affected area  6. Left knee pain, unspecified chronicity  -     Diclofenac Sodium (VOLTAREN) 1 %; Apply 2 g topically 4 (four) times a day To affected area    BMI Counseling: Body mass index is 26.46 kg/m². The BMI is above normal. Nutrition recommendations include decreasing portion sizes, encouraging healthy choices of fruits and vegetables, decreasing fast food intake, consuming healthier snacks, limiting drinks that contain sugar and moderation in carbohydrate intake. Exercise recommendations include moderate physical activity 150 minutes/week and exercising 3-5 times per week. Rationale for BMI follow-up plan is due to patient being overweight or obese.         History of Present Illness     Back Pain: Patient presents for presents evaluation of low back problems.  Symptoms have been present for 6 months and include pain in lower back (aching in character; 7/10 in severity). 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 Symptoms are worst: morning. Alleviating factors identifiable by patient are bending backwards. Exacerbating factors identifiable by patient are bending forwards and bending sideways, lifting weights. Treatments so far initiated by patient: Flexeril, Robaxin, Medrol dose pack, Mobic, Naproxen.  He states that the Lidoderm patches did not help much but this mentions that Medrol Dosepak helped a lot with his pain.  Up with orthopedic surgery and followed up with pain medicine twice so far.               Review of Systems   Constitutional:  Negative for chills and fever.   HENT:  Negative for ear pain and sore throat.    Eyes:  Negative for pain and visual disturbance.   Respiratory:  Negative for  cough and shortness of breath.    Cardiovascular:  Negative for chest pain and palpitations.   Gastrointestinal:  Negative for abdominal pain and vomiting.   Genitourinary:  Negative for dysuria and hematuria.   Musculoskeletal:  Positive for back pain. Negative for arthralgias.   Skin:  Negative for color change and rash.   Neurological:  Negative for seizures and syncope.   All other systems reviewed and are negative.    Past Medical History:   Diagnosis Date    Anemia     GERD (gastroesophageal reflux disease)      Past Surgical History:   Procedure Laterality Date    DENTAL SURGERY      NO PAST SURGERIES      WISDOM TOOTH EXTRACTION       Family History   Problem Relation Age of Onset    Diabetes Mother     Diabetes Father     Cancer Father     Diabetes Paternal Aunt      Social History     Tobacco Use    Smoking status: Every Day     Types: E-Cigarettes, Cigars    Smokeless tobacco: Never    Tobacco comments:     vapes, used to smoke cigarretes 0.25 ppd for 15-20 years   Vaping Use    Vaping status: Every Day    Substances: Nicotine, CBD   Substance and Sexual Activity    Alcohol use: Yes     Alcohol/week: 7.0 standard drinks of alcohol     Types: 7 Standard drinks or equivalent per week     Comment: varies. maybe five shooters a day, last drink couple days ago    Drug use: Yes     Types: Marijuana     Comment: once in a while    Sexual activity: Yes     Partners: Female     Birth control/protection: None     Comment: Wife     Current Outpatient Medications on File Prior to Visit   Medication Sig    acetaminophen (TYLENOL) 500 mg tablet Take 2 tablets (1,000 mg total) by mouth 2 (two) times a day    methylPREDNISolone 4 MG tablet therapy pack Use as directed on package    Multiple Vitamin (multivitamin) tablet Take 1 tablet by mouth daily    acetaminophen (TYLENOL) 325 mg tablet Take 650 mg by mouth every 6 (six) hours as needed for mild pain (Patient not taking: Reported on 2/22/2024)    atorvastatin (LIPITOR)  20 mg tablet Take 1 tablet (20 mg total) by mouth daily    benzonatate (TESSALON PERLES) 100 mg capsule Take 1 capsule (100 mg total) by mouth 2 (two) times a day as needed for cough (Patient not taking: Reported on 6/28/2024)    Biotin 1 MG CAPS Take by mouth (Patient not taking: Reported on 2/22/2024)    ciprofloxacin-dexamethasone (CIPRODEX) otic suspension Administer 4 drops into both ears 2 (two) times a day for 7 days    cyclobenzaprine (FLEXERIL) 10 mg tablet Take 1 tablet (10 mg total) by mouth 3 (three) times a day as needed for muscle spasms for up to 4 days    Dextromethorphan Polistirex ER 30 MG/5ML SUER Take 10 mL (60 mg total) by mouth 2 (two) times a day (Patient not taking: Reported on 6/28/2024)    famotidine (PEPCID) 20 mg tablet Take 1 tablet (20 mg total) by mouth daily (Patient not taking: Reported on 1/11/2024)    lidocaine (LIDODERM) 5 % APPLY 1 PATCH TO THE AFFECTED AREA DAILY. LEAVE ON FOR 12 HOURS AND THEN OFF FOR 12 HOURS. (Patient not taking: Reported on 6/28/2024)    meloxicam (Mobic) 15 mg tablet Take 1 tablet (15 mg total) by mouth daily    naproxen (Naprosyn) 500 mg tablet Take 1 tablet (500 mg total) by mouth 2 (two) times a day with meals for 14 days (Patient not taking: Reported on 11/7/2023)    naproxen (Naprosyn) 500 mg tablet Take 1 tablet (500 mg total) by mouth 2 (two) times a day with meals (Patient not taking: Reported on 6/28/2024)    naproxen (NAPROSYN) 500 mg tablet Take 1 tablet (500 mg total) by mouth 2 (two) times a day with meals for 7 days    Nutritional Supplements (VITAMIN D BOOSTER PO) Take by mouth in the morning (Patient not taking: Reported on 6/28/2024)    Omega-3 Fatty Acids (FISH OIL CONCENTRATE PO) Take by mouth in the morning (Patient not taking: Reported on 6/28/2024)    pseudoephedrine (SUDAFED) 120 MG 12 hr tablet Take 1 tablet (120 mg total) by mouth 2 (two) times a day for 5 days    valACYclovir (VALTREX) 1,000 mg tablet Take 2 tablets (2,000 mg  "total) by mouth 2 (two) times a day for 1 day    [DISCONTINUED] Diclofenac Sodium (VOLTAREN) 1 % Apply 2 g topically 4 (four) times a day To affected area (Patient not taking: Reported on 6/28/2024)    [DISCONTINUED] methocarbamol (ROBAXIN) 500 mg tablet Take 1 tablet (500 mg total) by mouth 2 (two) times a day as needed for muscle spasms     No Known Allergies  Immunization History   Administered Date(s) Administered    Tdap 04/30/2018    Tuberculin Skin Test-PPD Intradermal 09/24/2018, 10/30/2019     Objective     /78 (BP Location: Left arm, Patient Position: Sitting, Cuff Size: Standard)   Pulse 68   Temp 97.6 °F (36.4 °C) (Tympanic)   Ht 5' 8\" (1.727 m)   Wt 78.9 kg (174 lb)   SpO2 98%   BMI 26.46 kg/m²     Physical Exam  Vitals and nursing note reviewed.   Constitutional:       General: He is not in acute distress.     Appearance: He is well-developed.   HENT:      Head: Normocephalic and atraumatic.   Eyes:      Conjunctiva/sclera: Conjunctivae normal.   Cardiovascular:      Rate and Rhythm: Normal rate and regular rhythm.      Pulses: Normal pulses.      Heart sounds: Normal heart sounds. No murmur heard.     No friction rub. No gallop.   Pulmonary:      Effort: Pulmonary effort is normal. No respiratory distress.      Breath sounds: Normal breath sounds. No stridor. No wheezing, rhonchi or rales.   Chest:      Chest wall: No tenderness.   Abdominal:      General: Bowel sounds are normal. There is no distension.      Palpations: Abdomen is soft. There is no mass.      Tenderness: There is no abdominal tenderness. There is no right CVA tenderness, left CVA tenderness, guarding or rebound.      Hernia: No hernia is present.   Musculoskeletal:         General: No swelling.      Cervical back: Neck supple.      Lumbar back: Tenderness and bony tenderness present. No lacerations or spasms. Decreased range of motion. Negative right straight leg raise test and negative left straight leg raise test.      " Right lower leg: No edema.      Left lower leg: No edema.      Comments: Tenderness to palpation in the midline lumbar spine with no tenderness to palpation in the paraspinal muscle   Skin:     General: Skin is warm and dry.      Capillary Refill: Capillary refill takes less than 2 seconds.   Neurological:      Mental Status: He is alert.   Psychiatric:         Mood and Affect: Mood normal.       Administrative Statements   I have spent a total time of 30 minutes on 06/28/24 In caring for this patient including Diagnostic results, Instructions for management, Patient and family education, Importance of tx compliance, Risk factor reductions, Impressions, and Counseling / Coordination of care.

## 2024-06-28 NOTE — ASSESSMENT & PLAN NOTE
Patient presents to the clinic for follow-up on his back pain.  He was seen by pain medicine twice so far and has been on Flexeril, Robaxin, Lidoderm patches, Mobic, Voltaren gel and Medrol Dosepak.  He states that these medications did help to a certain extent with his pain especially the Medrol Dosepak.  He did try physical therapy for a few months in the past.   The pain is worse immediately in the morning after waking up and is worse on bending forwards.  He states that it is a sharp aching pain and rates it as a 7 out of 10.   He had MRI of the lumbar spine done which showed disc bulge at L4-L5 and L5-S1 with mild foraminal narrowing at L5-S1.  XR spine was unremarkable  No red flag symptoms of back pain present    Plan  Continuation of usual activity, minimize bedrest, apply a heating pad to the back  Continue with short course of Medrol Dosepak and Flexeril as needed for pain  Tylenol as needed for pain, no NSAIDs (Advil, Aleve, Motrin) while taking steroids due to increased risk of GI side effects   Continue Voltaren gel as needed for pain  Follow-up with pain medicine  Advised gentle massage and stretches as tolerated   Gave patient handout about chronic low back pain management and back exercises to be done at home  Gave referral to physical therapy; discussed with patient to call physical therapy again  Follow-up in 1 month for acupuncture in the clinic

## 2024-06-28 NOTE — TELEPHONE ENCOUNTER
Patient in office wanting to see if Dr. Oliveira was able to send the third RX. He is not sure what the name of the medication is.    Dr. Oliveira can you advise?

## 2024-06-29 DIAGNOSIS — M54.50 BILATERAL LOW BACK PAIN WITHOUT SCIATICA, UNSPECIFIED CHRONICITY: Chronic | ICD-10-CM

## 2024-06-29 RX ORDER — METHYLPREDNISOLONE 4 MG/1
TABLET ORAL
Qty: 1 EACH | Refills: 0 | Status: SHIPPED | OUTPATIENT
Start: 2024-06-29

## 2024-07-09 ENCOUNTER — EVALUATION (OUTPATIENT)
Dept: PHYSICAL THERAPY | Facility: CLINIC | Age: 49
End: 2024-07-09
Payer: COMMERCIAL

## 2024-07-09 DIAGNOSIS — R29.3 POOR POSTURE: ICD-10-CM

## 2024-07-09 DIAGNOSIS — M47.816 LUMBAR SPONDYLOSIS: Primary | ICD-10-CM

## 2024-07-09 DIAGNOSIS — R29.898 WEAKNESS OF BOTH HIPS: ICD-10-CM

## 2024-07-09 DIAGNOSIS — M54.50 LUMBAR BACK PAIN: ICD-10-CM

## 2024-07-09 PROCEDURE — 97162 PT EVAL MOD COMPLEX 30 MIN: CPT

## 2024-07-09 NOTE — LETTER
July 10, 2024    Ines Oliveira MD  [address] [place] [address]  [address] [place] [id number]    Patient: José Miguel Lazaro   YOB: 1975   Date of Visit: 2024     Encounter Diagnosis     ICD-10-CM    1. Lumbar spondylosis  M47.816       2. Poor posture  R29.3       3. Lumbar back pain  M54.50 Ambulatory Referral to Physical Therapy      4. Weakness of both hips  R29.898           Dear Dr. Oliveira:    Thank you for your recent referral of José Miguel Lazaro. Please review the attached evaluation summary from José Miguel's recent visit.     Please verify that you agree with the plan of care by signing the attached order.     If you have any questions or concerns, please do not hesitate to call.     I sincerely appreciate the opportunity to share in the care of one of your patients and hope to have another opportunity to work with you in the near future.       Sincerely,    Lavonne Quintero, PT      Referring Provider:      I certify that I have read the below Plan of Care and certify the need for these services furnished under this plan of treatment while under my care.                    nIes Oliveira MD  [address] [facility]  [address] [place] [id number]  Via In Basket          PT Evaluation     Today's date: 2024  Patient name: José Miguel Lazaro  : 1975  MRN: [id number]  Referring provider: Ines Oliveira MD  Dx:   Encounter Diagnosis     ICD-10-CM    1. Lumbar spondylosis  M47.816       2. Poor posture  R29.3       3. Lumbar back pain  M54.50 Ambulatory Referral to Physical Therapy      4. Weakness of both hips  R29.898           Start Time: 0800  Stop Time: 08  Total time in clinic (min): 45 minutes    Assessment  Impairments: abnormal or restricted ROM, activity intolerance, impaired physical strength, lacks appropriate home exercise program, pain with function, poor posture , poor body mechanics, participation limitations and activity limitations    Assessment details: José Miguel  Tejas is a 49 y.o. male presenting with bilateral low back pain that began gradually a few months ago without injury and has recently become more constant. Pt does not currently present with any radicular symptoms in either lower extremity. Pt presents with decreased spinal ROM and segmental mobility, decreased hip ROM, decreased hip strength, altered biomechanics with functional movements, tenderness to palpation at L4-5 region, and tightness throughout lumbar paraspinals. Pt functional limitations currently include transitional movements, prolonged sitting, regular exercise, lifting, and working due to pain. Pt clinical presentation is consistent with likely lumbar spondylosis, most significant at L4-5 region, due to prolonged postural and biomechanical dysfunction. Educated pt on STS mechanics to begin addressing squat and lifting mechanics, as well as posture and positioning for standing and sitting. José Miguel Lazaro would benefit from skilled physical therapy services to address aforementioned impairments, reduce pain, and promote return to PLOF without limitation.       Understanding of Dx/Px/POC: good     Prognosis: good    Goals  STG 2-4 weeks  1. Patient will be I with HEP  2. Patient will demonstrate correct squat mechanics without cueing from PT.   3. Patient will be able to experience improvement of symptoms upon waking in under 1 hour.  4. Patient will demonstrate good standing posture without cueing from PT.    LTG 6-8 weeks   1. Patient will be able to lift at least 25lbs off the ground with proper mechanics and pain < 2/10  2. Patient will be able to transition from supine or sitting to standing with pain <2/10  3. Patient will be able to return to regular exercise activity without limitation.   4. Patient will be able to sit for at least 1 hour with pain remaining < 2/10 to maximize ability to meet work requirements.      Plan  Patient would benefit from: skilled physical therapy and PT  eval  Planned modality interventions: thermotherapy: hydrocollator packs, cryotherapy and electrical stimulation/Russian stimulation    Planned therapy interventions: abdominal trunk stabilization, activity modification, body mechanics training, flexibility, home exercise program, therapeutic activities, therapeutic exercise, stretching, strengthening, patient/caregiver education, neuromuscular re-education, manual therapy, kinesiology taping and joint mobilization    Frequency: 2x week  Duration in weeks: 8  Plan of Care beginning date: 2024  Plan of Care expiration date: 9/3/2024  Treatment plan discussed with: patient  Plan details: HEP development, stretching, strengthening, A/AA/PROM, joint mobilizations, posture education, STM/MI as needed to reduce muscle tension, muscle reeducation, PLOC discussed and agreed upon with patient.          Subjective Evaluation    History of Present Illness  Mechanism of injury: José Miguel Lazaro reports experiencing low back pain that gradually began a few months ago without injury. Pt states the pain used to be more intermittent, however is now becoming much more constant. Pt reports pain is bilateral, denies any numbness or pain down either leg, and reports pain is typically an ache and tightness sensation. Pt states the pain is often worse upon waking up in the morning and states it takes about 2 hours to feel like pain eases up. Pt reports difficulty with lifting, exercises such as crunches, sitting for prolonged periods of time especially when driving for work, and transitioning from sitting to standing. Pt reports being prescribed a steroid by MD that he intends to begin tomorrow and use of an analgesic cream that doesn't seem to provide much relief.       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    Thoracic Spine  Hyperkyphosis.    Lumbar Spine   Increased lordosis.     Pelvis   Anterior pelvic tilt    Comments  Standing posture: hips and pelvis pushed forward    Postural Observations  Seated posture: fair  Standing posture: poor      Palpation   Left   No palpable tenderness to the quadratus lumborum.   Hypertonic in the erector spinae, lumbar paraspinals and quadratus lumborum.   Tenderness of the erector spinae and lumbar paraspinals.     Right   No palpable tenderness to the erector spinae and quadratus lumborum.   Hypertonic in the erector spinae, lumbar paraspinals and quadratus lumborum.   Tenderness of the lumbar paraspinals.     Tenderness     Lumbar Spine  Tenderness in the facet joint.     Left Hip   No tenderness in the PSIS.     Right Hip   No tenderness in the PSIS.     Additional Tenderness Details  L4-5 tenderness     Active Range of Motion     Lumbar   Flexion:  Restriction level: moderate  Extension:  Restriction level: minimal  Left lateral flexion:  Restriction level: moderate  Right lateral flexion:  Restriction level: moderate    Additional Active Range of Motion Details  Lumbar extension primarily generated from L4-5 segment, significantly reduced segmental mobility     Joint Play     Hypomobile: L1, L2, L3 and S1     Pain: L4 and L5   L5 comments: increased mobility compared to above and below segments  Mechanical Assessment    Cervical      Thoracic      Lumbar    Standing extension: repeated movements  Pain location: no change  Pain intensity: better  Pain level: decreased    Strength/Myotome Testing     Left Hip   Planes of  Motion   Flexion: 4  External rotation: 4-  Internal rotation: 4-    Right Hip   Planes of Motion   Flexion: 4  External rotation: 4-  Internal rotation: 4-    Left Knee   Flexion: 4+  Extension: 4+    Right Knee   Flexion: 4+  Extension: 4+    Left Ankle/Foot   Dorsiflexion: 5    Right Ankle/Foot   Dorsiflexion: 5    Ambulation     Observational Gait   Stride length within functional limits. Decreased walking speed.     Quality of Movement During Gait     Pelvis  Anterior pelvic tilt.     Additional Quality of Movement During Gait Details  Decreased postural sway     Functional Assessment        Comments  Squat: bilateral anterior tibial translation beyond toes, leads movement with knee flexion and anterior tibial translation, minimal hip hinge, excessive spinal flexion    General Comments:      Hip Comments   Moderate restrictions bilaterally into hip IR and ER              Precautions: none     Access Code: [email or web address]  URL: [email or web address]  Date: 07/09/2024  Prepared by: Lavonne Quintero    Exercises  - Supine Lower Trunk Rotation  - 1 x daily - 7 x weekly - 2 sets - 10 reps  - Supine Gluteus Stretch  - 1 x daily - 7 x weekly - 1 sets - 4 reps - 30sec hold  - Supine Posterior Pelvic Tilt  - 1 x daily - 7 x weekly - 2 sets - 10 reps - 5sec hold  - Supine Bridge with Mini Swiss Ball Between Knees  - 1 x daily - 7 x weekly - 2 sets - 10 reps - 5sec hold     Insurance:  AMA/CMS Eval/ Re-eval Auth #/ Referral # Total units or visits Start date  Expiration date KX? Visit limitation?  PT only or  PT+OT? Co-Insurance   CMS 7/9/24 Not required     BOMN  No copay, no coins                 POC Start Date POC Expiration Date Signed POC?   7/9/24 9/3/24 pending      Date               Visits/Units:  Used               Authed: Remaining                            7/9    Manuals    IE                               Neuro Re-Ed       Hooklying TA activation    10x w/ 5sec hold with tactile cueing    Bridge with  add iso     10x w/ 5sec hold   STS    Training 10x with multimodal cueing                                Ther Ex       HEP    Initiated    Education    Posture, positioning for driving, biomechanics, POC    Cross over glute stretch     2x30s ea   LTR    10x                               Ther Activity                     Gait Training                     Modalities

## 2024-07-09 NOTE — PROGRESS NOTES
PT Evaluation     Today's date: 2024  Patient name: José Miguel Lazaro  : 1975  MRN: [id number]  Referring provider: Ines Oliveira MD  Dx:   Encounter Diagnosis     ICD-10-CM    1. Lumbar spondylosis  M47.816       2. Poor posture  R29.3       3. Lumbar back pain  M54.50 Ambulatory Referral to Physical Therapy      4. Weakness of both hips  R29.898           Start Time: 0800  Stop Time: 0845  Total time in clinic (min): 45 minutes    Assessment  Impairments: abnormal or restricted ROM, activity intolerance, impaired physical strength, lacks appropriate home exercise program, pain with function, poor posture , poor body mechanics, participation limitations and activity limitations    Assessment details: José Miguel Lazaro is a 49 y.o. male presenting with bilateral low back pain that began gradually a few months ago without injury and has recently become more constant. Pt does not currently present with any radicular symptoms in either lower extremity. Pt presents with decreased spinal ROM and segmental mobility, decreased hip ROM, decreased hip strength, altered biomechanics with functional movements, tenderness to palpation at L4-5 region, and tightness throughout lumbar paraspinals. Pt functional limitations currently include transitional movements, prolonged sitting, regular exercise, lifting, and working due to pain. Pt clinical presentation is consistent with likely lumbar spondylosis, most significant at L4-5 region, due to prolonged postural and biomechanical dysfunction. Educated pt on STS mechanics to begin addressing squat and lifting mechanics, as well as posture and positioning for standing and sitting. José Miguel Lazaro would benefit from skilled physical therapy services to address aforementioned impairments, reduce pain, and promote return to PLOF without limitation.       Understanding of Dx/Px/POC: good     Prognosis: good    Goals  STG 2-4 weeks  1. Patient will be I with HEP  2.  Patient will demonstrate correct squat mechanics without cueing from PT.   3. Patient will be able to experience improvement of symptoms upon waking in under 1 hour.  4. Patient will demonstrate good standing posture without cueing from PT.    LTG 6-8 weeks   1. Patient will be able to lift at least 25lbs off the ground with proper mechanics and pain < 2/10  2. Patient will be able to transition from supine or sitting to standing with pain <2/10  3. Patient will be able to return to regular exercise activity without limitation.   4. Patient will be able to sit for at least 1 hour with pain remaining < 2/10 to maximize ability to meet work requirements.      Plan  Patient would benefit from: skilled physical therapy and PT eval  Planned modality interventions: thermotherapy: hydrocollator packs, cryotherapy and electrical stimulation/Russian stimulation    Planned therapy interventions: abdominal trunk stabilization, activity modification, body mechanics training, flexibility, home exercise program, therapeutic activities, therapeutic exercise, stretching, strengthening, patient/caregiver education, neuromuscular re-education, manual therapy, kinesiology taping and joint mobilization    Frequency: 2x week  Duration in weeks: 8  Plan of Care beginning date: 7/9/2024  Plan of Care expiration date: 9/3/2024  Treatment plan discussed with: patient  Plan details: HEP development, stretching, strengthening, A/AA/PROM, joint mobilizations, posture education, STM/MI as needed to reduce muscle tension, muscle reeducation, PLOC discussed and agreed upon with patient.          Subjective Evaluation    History of Present Illness  Mechanism of injury: José Miguel Lazaro reports experiencing low back pain that gradually began a few months ago without injury. Pt states the pain used to be more intermittent, however is now becoming much more constant. Pt reports pain is bilateral, denies any numbness or pain down either leg, and  reports pain is typically an ache and tightness sensation. Pt states the pain is often worse upon waking up in the morning and states it takes about 2 hours to feel like pain eases up. Pt reports difficulty with lifting, exercises such as crunches, sitting for prolonged periods of time especially when driving for work, and transitioning from sitting to standing. Pt reports being prescribed a steroid by MD that he intends to begin tomorrow and use of an analgesic cream that doesn't seem to provide much relief.       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    Thoracic Spine  Hyperkyphosis.    Lumbar Spine   Increased lordosis.     Pelvis   Anterior pelvic tilt    Comments  Standing posture: hips and pelvis pushed forward    Postural Observations  Seated posture: fair  Standing posture: poor      Palpation   Left   No palpable tenderness to the quadratus lumborum.   Hypertonic in the erector spinae, lumbar paraspinals and quadratus lumborum.   Tenderness of the erector spinae and lumbar paraspinals.     Right   No palpable tenderness to the erector spinae and quadratus lumborum.   Hypertonic in the erector spinae, lumbar paraspinals and quadratus lumborum.   Tenderness of the lumbar paraspinals.     Tenderness     Lumbar Spine  Tenderness in the facet joint.     Left Hip   No tenderness in the PSIS.     Right Hip   No tenderness in the PSIS.     Additional Tenderness Details  L4-5 tenderness      Active Range of Motion     Lumbar   Flexion:  Restriction level: moderate  Extension:  Restriction level: minimal  Left lateral flexion:  Restriction level: moderate  Right lateral flexion:  Restriction level: moderate    Additional Active Range of Motion Details  Lumbar extension primarily generated from L4-5 segment, significantly reduced segmental mobility     Joint Play     Hypomobile: L1, L2, L3 and S1     Pain: L4 and L5   L5 comments: increased mobility compared to above and below segments  Mechanical Assessment    Cervical      Thoracic      Lumbar    Standing extension: repeated movements  Pain location: no change  Pain intensity: better  Pain level: decreased    Strength/Myotome Testing     Left Hip   Planes of Motion   Flexion: 4  External rotation: 4-  Internal rotation: 4-    Right Hip   Planes of Motion   Flexion: 4  External rotation: 4-  Internal rotation: 4-    Left Knee   Flexion: 4+  Extension: 4+    Right Knee   Flexion: 4+  Extension: 4+    Left Ankle/Foot   Dorsiflexion: 5    Right Ankle/Foot   Dorsiflexion: 5    Ambulation     Observational Gait   Stride length within functional limits. Decreased walking speed.     Quality of Movement During Gait     Pelvis  Anterior pelvic tilt.     Additional Quality of Movement During Gait Details  Decreased postural sway     Functional Assessment        Comments  Squat: bilateral anterior tibial translation beyond toes, leads movement with knee flexion and anterior tibial translation, minimal hip hinge, excessive spinal flexion    General Comments:      Hip Comments   Moderate restrictions bilaterally into hip IR and ER              Precautions: none     Access Code: [email or web address]  URL: [email or web address]  Date: 07/09/2024  Prepared by: Lavonne Quintero    Exercises  - Supine Lower Trunk Rotation  - 1 x daily - 7 x weekly - 2 sets - 10 reps  - Supine Gluteus Stretch  - 1 x daily - 7 x weekly - 1 sets - 4 reps - 30sec hold  - Supine Posterior  Pelvic Tilt  - 1 x daily - 7 x weekly - 2 sets - 10 reps - 5sec hold  - Supine Bridge with Mini Swiss Ball Between Knees  - 1 x daily - 7 x weekly - 2 sets - 10 reps - 5sec hold     Insurance:  AMA/CMS Eval/ Re-eval Auth #/ Referral # Total units or visits Start date  Expiration date KX? Visit limitation?  PT only or  PT+OT? Co-Insurance   CMS 7/9/24 Not required     BOMN  No copay, no coins                 POC Start Date POC Expiration Date Signed POC?   7/9/24 9/3/24 pending      Date               Visits/Units:  Used               Authed: Remaining                            7/9    Manuals    IE                               Neuro Re-Ed       Hooklying TA activation    10x w/ 5sec hold with tactile cueing    Bridge with add iso     10x w/ 5sec hold   STS    Training 10x with multimodal cueing                                Ther Ex       HEP    Initiated    Education    Posture, positioning for driving, biomechanics, POC    Cross over glute stretch     2x30s ea   LTR    10x                               Ther Activity                     Gait Training                     Modalities

## 2024-07-16 ENCOUNTER — OFFICE VISIT (OUTPATIENT)
Dept: PHYSICAL THERAPY | Facility: CLINIC | Age: 49
End: 2024-07-16
Payer: COMMERCIAL

## 2024-07-16 DIAGNOSIS — M54.50 LUMBAR BACK PAIN: ICD-10-CM

## 2024-07-16 DIAGNOSIS — R29.3 POOR POSTURE: ICD-10-CM

## 2024-07-16 DIAGNOSIS — M47.816 LUMBAR SPONDYLOSIS: Primary | ICD-10-CM

## 2024-07-16 PROCEDURE — 97110 THERAPEUTIC EXERCISES: CPT

## 2024-07-16 PROCEDURE — 97112 NEUROMUSCULAR REEDUCATION: CPT

## 2024-07-16 NOTE — PROGRESS NOTES
Daily Note     Today's date: 2024  Patient name: José Miguel Lazaro  : 1975  MRN: [id number]  Referring provider: Ines Oliveira MD  Dx:   Encounter Diagnosis     ICD-10-CM    1. Lumbar spondylosis  M47.816       2. Poor posture  R29.3       3. 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       Objective: See treatment diary below      Assessment: Tolerated treatment well. Reviewed HEP with intermittent cueing for stretches to target appropriate musculature and mechanics of pelvic tilt to ensure TA engagement. Pt began experiencing increase in R sided low back pain with table exercises/stretches, specifically bridges. Instructed pt to perform only L rotation with repeated knee rocks to R; reassessed bridge with pt experiencing complete resolution of pain. Updated HEP to reflect potential directional preference. Pt required tactile and verbal cueing for STS mechanics; demonstrated difficulty with LE muscle engagement to produce and control movement. Patient demonstrated fatigue post treatment, exhibited good technique with therapeutic exercises, and would benefit from continued PT      Plan: Continue per plan of care.  Progress treatment as tolerated.       Precautions: none     Access Code: [email or web address]  URL: [email or web address]  Date: 2024  Prepared by: Lavonne Quintero    Exercises  - Supine Lower Trunk Rotation  - 1 x daily - 7 x weekly - 2 sets - 10 reps  - Supine Gluteus Stretch  - 1 x daily - 7 x weekly - 1 sets - 4 reps - 30sec hold  - Supine Posterior Pelvic Tilt  - 1 x daily - 7 x weekly - 2 sets - 10 reps - 5sec hold  - Supine Bridge with Mini Swiss Ball Between Knees  - 1 x daily - 7 x weekly - 2 sets - 10 reps - 5sec hold     Insurance:  AMA/CMS Eval/ Re-eval Auth #/  Referral # Total units or visits Start date  Expiration date KX? Visit limitation?  PT only or  PT+OT? Co-Insurance   CMS 7/9/24 [id number]  12V 7/9 10/9  BOMN  No copay, no coins                 POC Start Date POC Expiration Date Signed POC?   7/9/24 9/3/24 pending     [id number]  Date 7/9 7/18             Visits/Units:  Used 1 2             Authed: 12 visits  Remaining  11 10                        7/16 7/9    Manuals   2 IE   Lumbar PA mobs   Gr II-III     STM   R QL, R lumbar paraspinals                  Neuro Re-Ed       Hooklying TA activation   20x 5sec hold  10x w/ 5sec hold with tactile cueing    Bridge with add iso    2x10 5s hold 10x w/ 5sec hold   STS   15x tactile and verbal cueing for mechanics  Training 10x with multimodal cueing                                Ther Ex       HEP   Reviewed + updated - hold cross over stretch, knee rocks only to R  Initiated    Education    Posture, positioning for driving, biomechanics, POC    Cross over glute stretch    3x30s ea  2x30s ea   LTR   20x ea     10x R only - pain reduction  10x   Piriformis stretch    3x30s ea    Modified per str   1x30s ea side                   Ther Activity                     Gait Training                     Modalities

## 2024-07-19 ENCOUNTER — OFFICE VISIT (OUTPATIENT)
Dept: PHYSICAL THERAPY | Facility: CLINIC | Age: 49
End: 2024-07-19
Payer: COMMERCIAL

## 2024-07-19 DIAGNOSIS — M47.816 LUMBAR SPONDYLOSIS: Primary | ICD-10-CM

## 2024-07-19 DIAGNOSIS — R29.3 POOR POSTURE: ICD-10-CM

## 2024-07-19 DIAGNOSIS — M54.50 LUMBAR BACK PAIN: ICD-10-CM

## 2024-07-19 PROCEDURE — 97112 NEUROMUSCULAR REEDUCATION: CPT

## 2024-07-19 PROCEDURE — 97110 THERAPEUTIC EXERCISES: CPT

## 2024-07-19 NOTE — PROGRESS NOTES
Daily Note     Today's date: 2024  Patient name: José Miguel Lazaro  : 1975  MRN: [id number]  Referring provider: Ines Oliveira MD  Dx:   Encounter Diagnosis     ICD-10-CM    1. Lumbar spondylosis  M47.816       2. Poor posture  R29.3       3. Lumbar back pain  M54.50           Start Time: 0800  Stop Time: 0843  Total time in clinic (min): 43 minutes    Subjective: José Miguel reports feeling good after previous session until around the end of his work day. Pt states his back is feeling achy and sore on the right side this morning. Pt reported some discomfort transitioning from sitting to standing.       Objective: See treatment diary below      Assessment: Tolerated treatment well. José Miguel continues to experience some relief of right sided low back pain with L repeated rotation. Pt was able all exercises without increase in discomfort following repeated rotation exercises. Pt continues to require cueing for hip hinge and squat biomechanics to improve recruitment of LE musculature and reduce stress on lumbar spine. Pt was able to demonstrate significantly improved mechanics compared to initial assessment following extensive cueing and repetition. Patient would benefit from continued PT      Plan: Continue per plan of care.  Progress treatment as tolerated.       Precautions: none     Access Code: [email or web address]  URL: [email or web address]  Date: 2024  Prepared by: Lavonne Quintero    Exercises  - Supine Lower Trunk Rotation  - 1 x daily - 7 x weekly - 2 sets - 10 reps  - Supine Gluteus Stretch  - 1 x daily - 7 x weekly - 1 sets - 4 reps - 30sec hold  - Supine Posterior Pelvic Tilt  - 1 x daily - 7 x weekly - 2 sets - 10 reps - 5sec hold  - Supine Bridge with Mini Swiss Ball Between Knees  - 1 x daily - 7 x weekly - 2 sets - 10 reps - 5sec hold  - Sidelying Thoracic Rotation with Open Book  - 1 x daily - 7 x weekly - 2 sets - 10 reps - 5sec hold     Insurance:  AMA/CMS Eval/ Re-eval Auth  #/ Referral # Total units or visits Start date  Expiration date KX? Visit limitation?  PT only or  PT+OT? Co-Insurance   CMS 7/9/24 [id number]  12V 7/9 10/9  BOMN  No copay, no coins                 POC Start Date POC Expiration Date Signed POC?   7/9/24 9/3/24 pending     [id number]  Date 7/9 7/16 7/19            Visits/Units:  Used 1 2 3            Authed: 12 visits  Remaining  11 10 9                      7/19 7/16 7/9    Manuals  3 2 IE   Lumbar PA mobs   Gr II-III     STM   R QL, R lumbar paraspinals                  Neuro Re-Ed       Hooklying TA activation  20x 5sec hold w/ add iso  20x 5sec hold  10x w/ 5sec hold with tactile cueing    Bridge with add iso   2x10 5s hold  2x10 5s hold 10x w/ 5sec hold   STS   15x tactile and verbal cueing for mechanics  Training 10x with multimodal cueing    squats  2x10 w/ cueing for hip hinge     Hip hinge  Training w/ dowel and TB resistance at hips      Standing rows  Rishi 8.4 2x15     Pallof press   2x15 Rishi 5.0      Ther Ex       HEP  Reviewed + updated  Reviewed + updated - hold cross over stretch, knee rocks only to R  Initiated    Education    Posture, positioning for driving, biomechanics, POC    Cross over glute stretch    3x30s ea  2x30s ea   LTR  20x ea direction    20x to R only     20x ea     10x R only - pain reduction  10x   Piriformis stretch    3x30s ea    Modified per str   1x30s ea side     Open book stretch   Laying on R side 10x 5s hold             Ther Activity                     Gait Training                     Modalities

## 2024-07-23 ENCOUNTER — APPOINTMENT (OUTPATIENT)
Dept: PHYSICAL THERAPY | Facility: CLINIC | Age: 49
End: 2024-07-23
Payer: COMMERCIAL

## 2024-07-24 ENCOUNTER — OFFICE VISIT (OUTPATIENT)
Dept: PHYSICAL THERAPY | Facility: CLINIC | Age: 49
End: 2024-07-24
Payer: COMMERCIAL

## 2024-07-24 DIAGNOSIS — R29.3 POOR POSTURE: ICD-10-CM

## 2024-07-24 DIAGNOSIS — M54.50 LUMBAR BACK PAIN: ICD-10-CM

## 2024-07-24 DIAGNOSIS — R29.898 WEAKNESS OF BOTH HIPS: ICD-10-CM

## 2024-07-24 DIAGNOSIS — M47.816 LUMBAR SPONDYLOSIS: Primary | ICD-10-CM

## 2024-07-24 PROCEDURE — 97112 NEUROMUSCULAR REEDUCATION: CPT

## 2024-07-24 PROCEDURE — 97110 THERAPEUTIC EXERCISES: CPT

## 2024-07-26 ENCOUNTER — APPOINTMENT (OUTPATIENT)
Dept: PHYSICAL THERAPY | Facility: CLINIC | Age: 49
End: 2024-07-26
Payer: COMMERCIAL

## 2024-07-30 ENCOUNTER — OFFICE VISIT (OUTPATIENT)
Dept: PHYSICAL THERAPY | Facility: CLINIC | Age: 49
End: 2024-07-30
Payer: COMMERCIAL

## 2024-07-30 DIAGNOSIS — R29.898 WEAKNESS OF BOTH HIPS: ICD-10-CM

## 2024-07-30 DIAGNOSIS — M47.816 LUMBAR SPONDYLOSIS: Primary | ICD-10-CM

## 2024-07-30 DIAGNOSIS — R29.3 POOR POSTURE: ICD-10-CM

## 2024-07-30 DIAGNOSIS — M54.50 LUMBAR BACK PAIN: ICD-10-CM

## 2024-07-30 PROCEDURE — 97140 MANUAL THERAPY 1/> REGIONS: CPT

## 2024-07-30 PROCEDURE — 97112 NEUROMUSCULAR REEDUCATION: CPT

## 2024-07-30 PROCEDURE — 97110 THERAPEUTIC EXERCISES: CPT

## 2024-07-30 NOTE — PROGRESS NOTES
1. Nicotine abuse  Is cutting down and trying to quit. More a habit.   Smokes via Vape all day    2. Lumbar back pain  Tx as noted    Also trying to eat better.     Risks and benefits of therapeutic plan discussed, answered all patient questions and concerns and patient expressed understanding and agreement of therapeutic plan.    Follow up in 2 weeks    Plan next visit: PENS lob back pain tx    HPI: Here for follow up of chronic back pain low back  He lifts heavy appliances    No h/o major trauma    No Red flags:  No unexplained fevers/chills/sweats/weight loss/history of IVDA/immunosuppressive disorder/personal history of cancer/unusual night pain/deep bone pain/unexplained neurological symptoms such as numbness, motor weakness, sphincter dysfunction of bladder or bowel, saddle anesthesia, cramping ache legs with activity such as walking (for spinal stenosis) NEG    6 number of  Physical therapy visits so far.     Stress level: low    Sleep: short but refreshing     Chart reviewed for relevant medical, surgical and psychosocial history, medications and allergies. See problem list  for key events and imaging related to chronic back pain    WBC   Date/Time Value Ref Range Status   07/11/2022 09:06 AM 5.55 4.31 - 10.16 Thousand/uL Final     Hemoglobin   Date/Time Value Ref Range Status   07/11/2022 09:06 AM 12.5 12.0 - 17.0 g/dL Final     Hematocrit   Date/Time Value Ref Range Status   07/11/2022 09:06 AM 40.5 36.5 - 49.3 % Final     MCV   Date/Time Value Ref Range Status   07/11/2022 09:06 AM 77 (L) 82 - 98 fL Final     Platelets   Date/Time Value Ref Range Status   07/11/2022 09:06  149 - 390 Thousands/uL Final     Sodium   Date/Time Value Ref Range Status   07/21/2023 07:48  134 - 144 mmol/L Final     Potassium   Date/Time Value Ref Range Status   07/21/2023 07:48 AM 4.1 3.5 - 5.2 mmol/L Final     Chloride   Date/Time Value Ref Range Status   07/21/2023 07:48  96 - 106 mmol/L Final     CO2    Date/Time Value Ref Range Status   07/21/2023 07:48 AM 24 20 - 29 mmol/L Final     ANION GAP   Date/Time Value Ref Range Status   07/11/2022 09:06 AM 8 4 - 13 mmol/L Final     BUN   Date/Time Value Ref Range Status   07/21/2023 07:48 AM 10 6 - 24 mg/dL Final     Creatinine   Date/Time Value Ref Range Status   07/21/2023 07:48 AM 0.99 0.76 - 1.27 mg/dL Final   07/11/2022 09:06 AM 0.96 0.60 - 1.30 mg/dL Final     Comment:     Standardized to IDMS reference method     Glucose, Random   Date/Time Value Ref Range Status   07/21/2023 07:48 AM 99 70 - 99 mg/dL Final     Calcium   Date/Time Value Ref Range Status   07/11/2022 09:06 AM 8.9 8.3 - 10.1 mg/dL Final     AST   Date/Time Value Ref Range Status   07/21/2023 07:48 AM 25 0 - 40 IU/L Final     ALT   Date/Time Value Ref Range Status   07/21/2023 07:48 AM 35 0 - 44 IU/L Final     Alkaline Phosphatase   Date/Time Value Ref Range Status   03/04/2018 08:56 AM 63 46 - 116 U/L Final     Protein, Total   Date/Time Value Ref Range Status   07/21/2023 07:48 AM 7.0 6.0 - 8.5 g/dL Final     Albumin   Date/Time Value Ref Range Status   07/21/2023 07:48 AM 4.4 4.1 - 5.1 g/dL Final     Comment:                   **Please note reference interval change**   03/04/2018 08:56 AM 4.1 3.5 - 5.0 g/dL Final     TOTAL BILIRUBIN   Date/Time Value Ref Range Status   07/21/2023 07:48 AM 0.4 0.0 - 1.2 mg/dL Final     eGFR   Date/Time Value Ref Range Status   07/21/2023 07:48 AM 95 >59 mL/min/1.73 Final   07/11/2022 09:06 AM 94 ml/min/1.73sq m Final       Hemoglobin A1C   Date/Time Value Ref Range Status   07/21/2023 07:48 AM 5.8 (H) 4.8 - 5.6 % Final     Comment:              Prediabetes: 5.7 - 6.4           Diabetes: >6.4           Glycemic control for adults with diabetes: <7.0       Cholesterol, Total   Date/Time Value Ref Range Status   07/21/2023 07:48  (H) 100 - 199 mg/dL Final     LDL Calculated   Date/Time Value Ref Range Status   07/21/2023 07:48  (H) 0 - 99 mg/dL Final      HDL   Date/Time Value Ref Range Status   07/21/2023 07:48 AM 58 >39 mg/dL Final     T. Chol/HDL Ratio   Date/Time Value Ref Range Status   01/18/2022 08:18 AM 4.2 0.0 - 5.0 ratio Final     Comment:                                       T. Chol/HDL Ratio                                              Men  Women                                1/2 Avg.Risk  3.4    3.3                                    Avg.Risk  5.0    4.4                                 2X Avg.Risk  9.6    7.1                                 3X Avg.Risk 23.4   11.0       Triglycerides   Date/Time Value Ref Range Status   07/21/2023 07:48  0 - 149 mg/dL Final     Magnesium   Date/Time Value Ref Range Status   03/04/2018 08:56 AM 2.0 1.6 - 2.6 mg/dL Final         EXAM: Vital signs as noted There were no vitals taken for this visit.  Alert, NAD  Posture: good  General coordination/movement integration, gait: good     Spine exam:    Myofascial findings (trigger points, hyperalgesia) Some hypertonicity lower para lumbar  Active range of motion: full and painless    Nerve tension signs: neg SLR    Core fitness assessment fairly good  Distal neurovascular (heel and toe walking/strength, sensory to light touch, vibration, deep tendon reflexes, Plantar (Babinski) reflexes, pulses and color) no complaints  Hip screens are non-contributory      Depression Screening and Follow-up Plan: Patient was screened for depression during today's encounter. They screened negative with a PHQ-2 score of 0.    Tobacco Cessation Counseling: Tobacco cessation counseling was provided. The patient is sincerely urged to quit consumption of tobacco. He is ready to quit tobacco.      Acupuncture: Electrostim at 4, then 15 hz suki. L2 - S1 10' EACH

## 2024-07-30 NOTE — PROGRESS NOTES
Daily Note     Today's date: 2024  Patient name: José Miguel Lazaro  : 1975  MRN: [id number]  Referring provider: Ines Oliveira MD  Dx:   Encounter Diagnosis     ICD-10-CM    1. Lumbar spondylosis  M47.816       2. Poor posture  R29.3       3. Lumbar back pain  M54.50       4. Weakness of both hips  R29.898           Start Time: 0800  Stop Time: 0845  Total time in clinic (min): 45 minutes    Subjective: José Miguel reports he has been really focusing on his lifting mechanics at work and has been feeling some improvement in his back, however continues to experience soreness and feelings of fatigue in his lower back muscles.       Objective: See treatment diary below      Assessment: Tolerated treatment well. José Miguel demonstrated improved carry over of cueing for squat mechanics and required minimal cueing from PT. Worked on exercises to reduce lumbar extensor activation and promote glute engagement; pt required tactile cueing however was able to perform correctly. Patient demonstrated fatigue post treatment, exhibited good technique with therapeutic exercises, and would benefit from continued PT      Plan: Continue per plan of care.  Progress treatment as tolerated.       Precautions: none     Access Code: [email or web address]  URL: [email or web address]  Date: 2024  Prepared by: Lavonne Quintero    Exercises  - Supine Lower Trunk Rotation  - 1 x daily - 7 x weekly - 2 sets - 10 reps  - Supine Gluteus Stretch  - 1 x daily - 7 x weekly - 1 sets - 4 reps - 30sec hold  - Supine Posterior Pelvic Tilt  - 1 x daily - 7 x weekly - 2 sets - 10 reps - 5sec hold  - Supine Bridge with Mini Swiss Ball Between Knees  - 1 x daily - 7 x weekly - 2 sets - 10 reps - 5sec hold  - Sidelying Thoracic Rotation with Open Book  - 1 x daily - 7 x weekly - 2 sets - 10 reps - 5sec hold     Insurance:  AMA/CMS Eval/ Re-eval Auth #/ Referral # Total units or visits Start date  Expiration date KX? Visit limitation?  PT  only or  PT+OT? Co-Insurance   CMS 7/9/24 [id number]  12V 7/9 10/9  BOMN  No copay, no coins                 POC Start Date POC Expiration Date Signed POC?   7/9/24 9/3/24 pending     [id number]  Date 7/9 7/16 7/19 7/24 7/30          Visits/Units:  Used 1 2 3 4 5          Authed: 12 visits  Remaining  11 10 9 8 7                   7/30 7/24 7/19 7/16 7/9    Manuals 5 4 3 2 IE   Lumbar PA mobs Gr III    Gr II-III     STM    R QL, R lumbar paraspinals                    Neuro Re-Ed        Hooklying TA activation  20x 5s hold  20x 5sec hold w/ add iso  20x 5sec hold  10x w/ 5sec hold with tactile cueing    Bridge with add iso  2x10 5s hold 2x10 5s hold  2x10 5s hold  2x10 5s hold 10x w/ 5sec hold   STS    15x tactile and verbal cueing for mechanics  Training 10x with multimodal cueing    squats 2x12 4lb counter balance  2x10 3lb counter balance  2x10 w/ cueing for hip hinge     Hip hinge  Training w/ dowel 10x  Training w/ dowel and TB resistance at hips      Standing rows   Rishi 8.4 2x15     Pallof press  W/ walkouts in mini squat rishi 3.5  W/ walkouts in mini squat rishi 3.0  2x15 Rishi 5.0      Supine marches  Table top 2x10  2x10 cues for TA activation       Hip extension Table top to train glute activation and minimize lumbar ext use 2x10        Ther Ex        HEP   Reviewed + updated  Reviewed + updated - hold cross over stretch, knee rocks only to R  Initiated    Education  Positioning at work, biomechanics    Posture, positioning for driving, biomechanics, POC    Cross over glute stretch     3x30s ea  2x30s ea   LTR 20x ea direction 5s hold  20x ea direction 20x ea direction    20x to R only     20x ea     10x R only - pain reduction  10x   Piriformis stretch     3x30s ea    Modified per str 3x30s ea side PT assist    1x30s ea side     Open book stretch    Laying on R side 10x 5s hold      DKTC  10x w/ overpressure - improved       Ther Activity                        Gait Training                         Modalities

## 2024-07-31 ENCOUNTER — PROCEDURE VISIT (OUTPATIENT)
Age: 49
End: 2024-07-31

## 2024-07-31 VITALS
OXYGEN SATURATION: 98 % | BODY MASS INDEX: 26.28 KG/M2 | TEMPERATURE: 97.7 F | HEIGHT: 68 IN | DIASTOLIC BLOOD PRESSURE: 82 MMHG | SYSTOLIC BLOOD PRESSURE: 130 MMHG | WEIGHT: 173.4 LBS | HEART RATE: 67 BPM

## 2024-07-31 DIAGNOSIS — Z72.0 NICOTINE ABUSE: Primary | ICD-10-CM

## 2024-07-31 DIAGNOSIS — M54.50 LUMBAR BACK PAIN: ICD-10-CM

## 2024-07-31 PROCEDURE — 99213 OFFICE O/P EST LOW 20 MIN: CPT | Performed by: FAMILY MEDICINE

## 2024-07-31 PROCEDURE — 97813 ACUP 1/> W/ESTIM 1ST 15 MIN: CPT | Performed by: FAMILY MEDICINE

## 2024-08-02 ENCOUNTER — APPOINTMENT (OUTPATIENT)
Dept: PHYSICAL THERAPY | Facility: CLINIC | Age: 49
End: 2024-08-02
Payer: COMMERCIAL

## 2024-08-05 ENCOUNTER — TELEPHONE (OUTPATIENT)
Age: 49
End: 2024-08-05

## 2024-08-05 ENCOUNTER — TELEPHONE (OUTPATIENT)
Dept: ENDOCRINOLOGY | Facility: CLINIC | Age: 49
End: 2024-08-05

## 2024-08-05 NOTE — TELEPHONE ENCOUNTER
Called patient to reschedule his missed apt. 8/5/2024 with Dr. Aguilar. Got him in 11/18/2024 and put him on a waitlist.

## 2024-08-05 NOTE — TELEPHONE ENCOUNTER
Fax received from [facility].    Copy Scanned into [employer].  Placed in Dr. Callahan's folder.    Fax completed form:  [phone]

## 2024-08-06 ENCOUNTER — OFFICE VISIT (OUTPATIENT)
Dept: PHYSICAL THERAPY | Facility: CLINIC | Age: 49
End: 2024-08-06
Payer: COMMERCIAL

## 2024-08-06 DIAGNOSIS — R29.3 POOR POSTURE: ICD-10-CM

## 2024-08-06 DIAGNOSIS — M54.50 LUMBAR BACK PAIN: ICD-10-CM

## 2024-08-06 DIAGNOSIS — R29.898 WEAKNESS OF BOTH HIPS: ICD-10-CM

## 2024-08-06 DIAGNOSIS — M47.816 LUMBAR SPONDYLOSIS: Primary | ICD-10-CM

## 2024-08-06 PROCEDURE — 97110 THERAPEUTIC EXERCISES: CPT

## 2024-08-06 PROCEDURE — 97112 NEUROMUSCULAR REEDUCATION: CPT

## 2024-08-06 NOTE — PROGRESS NOTES
Daily Note     Today's date: 2024  Patient name: José Miguel Lazaro  : 1975  MRN: [id number]  Referring provider: Ines Oliveira MD  Dx:   Encounter Diagnosis     ICD-10-CM    1. Lumbar spondylosis  M47.816       2. Poor posture  R29.3       3. Lumbar back pain  M54.50       4. Weakness of both hips  R29.898           Start Time: 08  Stop Time: 0848  Total time in clinic (min): 43 minutes    Subjective: José Miguel reports he has been very conscious of his lifting technique at work and feels like his back pain is improving.       Objective: See treatment diary below      Assessment: Tolerated treatment well. José Miguel demonstrated improvement in squat and lifting mechanics today; required intermittent cueing for hip hinge and equal weight distribution during concentric phase. Progressed core stabilization with pt tolerating well. Patient demonstrated fatigue post treatment, exhibited good technique with therapeutic exercises, and would benefit from continued PT      Plan: Continue per plan of care.  Progress treatment as tolerated.       Precautions: none     Access Code: [email or web address]  URL: [email or web address]  Date: 2024  Prepared by: Lavonne Quitnero    Exercises  - Supine Lower Trunk Rotation  - 1 x daily - 7 x weekly - 2 sets - 10 reps  - Supine Gluteus Stretch  - 1 x daily - 7 x weekly - 1 sets - 4 reps - 30sec hold  - Supine Posterior Pelvic Tilt  - 1 x daily - 7 x weekly - 2 sets - 10 reps - 5sec hold  - Supine Bridge with Mini Swiss Ball Between Knees  - 1 x daily - 7 x weekly - 2 sets - 10 reps - 5sec hold  - Sidelying Thoracic Rotation with Open Book  - 1 x daily - 7 x weekly - 2 sets - 10 reps - 5sec hold     Insurance:  AMA/CMS Eval/ Re-eval Auth #/ Referral # Total units or visits Start date  Expiration date KX? Visit limitation?  PT only or  PT+OT? Co-Insurance   CMS 24 [id number]  12 7/9 10/9  BOMN  No copay, no coins                 POC Start Date POC Expiration  Date Signed POC?   7/9/24 9/3/24 pending     [id number]  Date 7/9 7/16 7/19 7/24 7/30 8/6         Visits/Units:  Used 1 2 3 4 5 6         Authed: 12 visits  Remaining  11 10 9 8 7 6                  8/6 7/30 7/24 7/19 7/16 7/9    Manuals 6 5 4 3 2 IE   Lumbar PA mobs  Gr III    Gr II-III     STM     R QL, R lumbar paraspinals                      Neuro Re-Ed         Hooklying TA activation   20x 5s hold  20x 5sec hold w/ add iso  20x 5sec hold  10x w/ 5sec hold with tactile cueing    Bridge with add iso  2x10 5s hold  2x10 5s hold 2x10 5s hold  2x10 5s hold  2x10 5s hold 10x w/ 5sec hold   STS     15x tactile and verbal cueing for mechanics  Training 10x with multimodal cueing    squats 2x10 10lb counter balance  2x12 4lb counter balance  2x10 3lb counter balance  2x10 w/ cueing for hip hinge     Hip hinge   Training w/ dowel 10x  Training w/ dowel and TB resistance at hips      Standing rows    Rishi 8.4 2x15     Pallof press  W/ walkouts in mini squat rishi 5.2 10x ea way   W/ walkouts in mini squat rishi 3.5  W/ walkouts in mini squat rishi 3.0  2x15 Rishi 5.0      Supine marches  Table top 2x10  Table top 2x10  2x10 cues for TA activation       Hip extension Table top 2x10  Table top to train glute activation and minimize lumbar ext use 2x10        Lifting mechanics  35lbs from floor, cues for equal weight and hip hinge         Plank  4x20s hold        Ther Ex         HEP    Reviewed + updated  Reviewed + updated - hold cross over stretch, knee rocks only to R  Initiated    Education   Positioning at work, biomechanics    Posture, positioning for driving, biomechanics, POC    Cross over glute stretch      3x30s ea  2x30s ea   LTR 20x ea direction 5s hold  20x ea direction 5s hold  20x ea direction 20x ea direction    20x to R only     20x ea     10x R only - pain reduction  10x   Piriformis stretch  PT assisted 2x30s hold     3x30s ea    Modified per str  3x30s ea side PT assist    1x30s ea side     Open  book stretch     Laying on R side 10x 5s hold      DKTC   10x w/ overpressure - improved                         Ther Activity                           Gait Training                           Modalities

## 2024-08-07 ENCOUNTER — APPOINTMENT (OUTPATIENT)
Dept: PHYSICAL THERAPY | Facility: CLINIC | Age: 49
End: 2024-08-07
Payer: COMMERCIAL

## 2024-08-09 ENCOUNTER — APPOINTMENT (OUTPATIENT)
Dept: PHYSICAL THERAPY | Facility: CLINIC | Age: 49
End: 2024-08-09
Payer: COMMERCIAL

## 2024-08-20 PROBLEM — G89.29 CHRONIC BILATERAL LOW BACK PAIN WITHOUT SCIATICA: Status: ACTIVE | Noted: 2020-01-20

## 2024-08-20 NOTE — PROGRESS NOTES
1. Chronic bilateral low back pain without sciatica  Tx as noted    2. Nicotine abuse  Has cut down - encouraged to quit completely      Follow up in 1 - 2 weeks    HPI: Here for follow up of  myofascial dysfunction/pain at bilateral low back without sciatica or red flags. Is going to PT, lifting better.     Chart reviewed for relevant medical, surgical and psychosocial history, medications and allergies.    WBC   Date/Time Value Ref Range Status   07/11/2022 09:06 AM 5.55 4.31 - 10.16 Thousand/uL Final     Hemoglobin   Date/Time Value Ref Range Status   07/11/2022 09:06 AM 12.5 12.0 - 17.0 g/dL Final     Hematocrit   Date/Time Value Ref Range Status   07/11/2022 09:06 AM 40.5 36.5 - 49.3 % Final     MCV   Date/Time Value Ref Range Status   07/11/2022 09:06 AM 77 (L) 82 - 98 fL Final     Platelets   Date/Time Value Ref Range Status   07/11/2022 09:06  149 - 390 Thousands/uL Final     Sodium   Date/Time Value Ref Range Status   07/21/2023 07:48  134 - 144 mmol/L Final     Potassium   Date/Time Value Ref Range Status   07/21/2023 07:48 AM 4.1 3.5 - 5.2 mmol/L Final     Chloride   Date/Time Value Ref Range Status   07/21/2023 07:48  96 - 106 mmol/L Final     CO2   Date/Time Value Ref Range Status   07/21/2023 07:48 AM 24 20 - 29 mmol/L Final     ANION GAP   Date/Time Value Ref Range Status   07/11/2022 09:06 AM 8 4 - 13 mmol/L Final     BUN   Date/Time Value Ref Range Status   07/21/2023 07:48 AM 10 6 - 24 mg/dL Final     Creatinine   Date/Time Value Ref Range Status   07/21/2023 07:48 AM 0.99 0.76 - 1.27 mg/dL Final   07/11/2022 09:06 AM 0.96 0.60 - 1.30 mg/dL Final     Comment:     Standardized to IDMS reference method     Glucose, Random   Date/Time Value Ref Range Status   07/21/2023 07:48 AM 99 70 - 99 mg/dL Final     Calcium   Date/Time Value Ref Range Status   07/11/2022 09:06 AM 8.9 8.3 - 10.1 mg/dL Final     AST   Date/Time Value Ref Range Status   07/21/2023 07:48 AM 25 0 - 40 IU/L Final      ALT   Date/Time Value Ref Range Status   07/21/2023 07:48 AM 35 0 - 44 IU/L Final     Alkaline Phosphatase   Date/Time Value Ref Range Status   03/04/2018 08:56 AM 63 46 - 116 U/L Final     Protein, Total   Date/Time Value Ref Range Status   07/21/2023 07:48 AM 7.0 6.0 - 8.5 g/dL Final     Albumin   Date/Time Value Ref Range Status   07/21/2023 07:48 AM 4.4 4.1 - 5.1 g/dL Final     Comment:                   **Please note reference interval change**   03/04/2018 08:56 AM 4.1 3.5 - 5.0 g/dL Final     TOTAL BILIRUBIN   Date/Time Value Ref Range Status   07/21/2023 07:48 AM 0.4 0.0 - 1.2 mg/dL Final     eGFR   Date/Time Value Ref Range Status   07/21/2023 07:48 AM 95 >59 mL/min/1.73 Final   07/11/2022 09:06 AM 94 ml/min/1.73sq m Final       Hemoglobin A1C   Date/Time Value Ref Range Status   07/21/2023 07:48 AM 5.8 (H) 4.8 - 5.6 % Final     Comment:              Prediabetes: 5.7 - 6.4           Diabetes: >6.4           Glycemic control for adults with diabetes: <7.0       Cholesterol, Total   Date/Time Value Ref Range Status   07/21/2023 07:48  (H) 100 - 199 mg/dL Final     LDL Calculated   Date/Time Value Ref Range Status   07/21/2023 07:48  (H) 0 - 99 mg/dL Final     HDL   Date/Time Value Ref Range Status   07/21/2023 07:48 AM 58 >39 mg/dL Final     T. Chol/HDL Ratio   Date/Time Value Ref Range Status   01/18/2022 08:18 AM 4.2 0.0 - 5.0 ratio Final     Comment:                                       T. Chol/HDL Ratio                                              Men  Women                                1/2 Avg.Risk  3.4    3.3                                    Avg.Risk  5.0    4.4                                 2X Avg.Risk  9.6    7.1                                 3X Avg.Risk 23.4   11.0       Triglycerides   Date/Time Value Ref Range Status   07/21/2023 07:48  0 - 149 mg/dL Final     Magnesium   Date/Time Value Ref Range Status   03/04/2018 08:56 AM 2.0 1.6 - 2.6 mg/dL Final         Review  of systems  NO unexplained fever, chills, sweats or unexplained weight loss  NO new or unexplained chest pain or shortness of breath  NO unexplained changes in digestion or bowel movements or urination  NO red flag symptoms such as new or worsening neurological symptoms, deep bone pain in middle of night, major immunosuppressive disorder    Exam:   Alert, NAD /80 (BP Location: Left arm, Patient Position: Sitting, Cuff Size: Standard)   Pulse 60   Temp 98.2 °F (36.8 °C) (Tympanic)   Wt 78 kg (172 lb)   SpO2 97%   BMI 26.15 kg/m²     Skin: no pallor  Respiratory: no respiratory labor  Cardiac: Regular rate  Extremities: No cyanosis, clubbing or edema  Has trigger points, tender muscle regions in areas noted. + hypertonicity of paralumbar muscles    Risks and benefits of therapeutic plan discussed, answered all patient questions and concerns and patient expressed understanding and agreement of therapeutic plan.  Patient gave verbal consent for acupuncture/dry needling of noted trigger points with electrostimulation/Percutaneous neuro- stimulation (PENS) as needed.       Low back pain advanced protocol on involved side (s): electroacupuncture @ 10 Hz for 30 minutes PENS protocol: L3xL4 to S1XS3    Infra-red heat lamp as comfortable    Patient tolerated procedure well. Sterile single use disposable acupuncture needles used and all needles were accounted for and disposed of in the sharps container after the procedure.   Patient instructed in post-procedure care and expressed understanding.         I spent 30 minutes face to face with patient for the acupuncture procedure, as well as separately addressed the other diagnoses listed..

## 2024-08-21 ENCOUNTER — PROCEDURE VISIT (OUTPATIENT)
Age: 49
End: 2024-08-21

## 2024-08-21 VITALS
OXYGEN SATURATION: 97 % | WEIGHT: 172 LBS | BODY MASS INDEX: 26.15 KG/M2 | DIASTOLIC BLOOD PRESSURE: 80 MMHG | HEART RATE: 60 BPM | TEMPERATURE: 98.2 F | SYSTOLIC BLOOD PRESSURE: 118 MMHG

## 2024-08-21 DIAGNOSIS — Z72.0 NICOTINE ABUSE: ICD-10-CM

## 2024-08-21 DIAGNOSIS — M54.50 CHRONIC BILATERAL LOW BACK PAIN WITHOUT SCIATICA: Primary | Chronic | ICD-10-CM

## 2024-08-21 DIAGNOSIS — G89.29 CHRONIC BILATERAL LOW BACK PAIN WITHOUT SCIATICA: Primary | Chronic | ICD-10-CM

## 2024-08-21 PROCEDURE — 99213 OFFICE O/P EST LOW 20 MIN: CPT | Performed by: FAMILY MEDICINE

## 2024-08-21 PROCEDURE — 97813 ACUP 1/> W/ESTIM 1ST 15 MIN: CPT | Performed by: FAMILY MEDICINE

## 2024-08-21 PROCEDURE — 97814 ACUP 1/> W/ESTIM EA ADDL 15: CPT | Performed by: FAMILY MEDICINE

## 2024-08-21 RX ORDER — PREDNISOLONE ACETATE 10 MG/ML
SUSPENSION/ DROPS OPHTHALMIC
COMMUNITY
Start: 2024-08-15

## 2024-08-22 ENCOUNTER — OFFICE VISIT (OUTPATIENT)
Dept: URGENT CARE | Facility: CLINIC | Age: 49
End: 2024-08-22
Payer: COMMERCIAL

## 2024-08-22 VITALS
DIASTOLIC BLOOD PRESSURE: 78 MMHG | BODY MASS INDEX: 27.34 KG/M2 | OXYGEN SATURATION: 100 % | HEART RATE: 50 BPM | WEIGHT: 174.2 LBS | HEIGHT: 67 IN | RESPIRATION RATE: 18 BRPM | SYSTOLIC BLOOD PRESSURE: 126 MMHG | TEMPERATURE: 98 F

## 2024-08-22 DIAGNOSIS — L21.9 SEBORRHEIC DERMATITIS: Primary | ICD-10-CM

## 2024-08-22 PROCEDURE — 99213 OFFICE O/P EST LOW 20 MIN: CPT | Performed by: PHYSICIAN ASSISTANT

## 2024-08-22 RX ORDER — KETOCONAZOLE 20 MG/ML
1 SHAMPOO TOPICAL 2 TIMES WEEKLY
Qty: 8 ML | Refills: 0 | Status: SHIPPED | OUTPATIENT
Start: 2024-08-22 | End: 2024-09-21

## 2024-08-22 NOTE — PROGRESS NOTES
[facility]        NAME: José Miguel Lazaro is a 49 y.o. male  : 1975    MRN: [id number]  DATE: 2024  TIME: 12:10 PM    Assessment and Plan   Seborrheic dermatitis [L21.9]  1. Seborrheic dermatitis  ketoconazole (NIZORAL) 2 % shampoo    desonide (VERDESO) 0.05 % foam            Patient Instructions   Seborrheic Dermatitis:   -We discussed that his presentation is consistent with seborrheic dermatitis.  -Ketoconazole 2% cream to be applied to the affected areas as prescribed   -Will prescribe Desonide foam to be applied once daily to the affected areas for one week to reduce redness, irritation, itching  -We discussed to avoid going very short at the ca as this may be irritating to the rash   -If these measures do not improve the rash or cause remission of the rash Dermatology referral is advised    Follow up with PCP in 3-5 days.  Proceed to  ER if symptoms worsen.    If tests have been performed at [facility], our office will contact you with results if changes need to be made to the care plan discussed with you at the visit.  You can review your full results on [facility].    Chief Complaint     Chief Complaint   Patient presents with    Rash     Pt here for a rash  on the back of his head in the neck area , pt states ongoing  x 2 weeks , area  is itchy,  area is now spreading.  Pt used OTC  anti itchy lotion on it.          History of Present Illness       The patient is a 49-year-old male who presents today for a rash on the posterior aspect of his scalp x 2 weeks. He states that the rash is very pruritic and has been slowly spreading and progressing. He has been using OTC anti itch creams with no relief. No new shampoo, facial lotions, medications. No facial swelling, trouble swallowing. No wheezing, chest tightness, palpitations, nausea. No sick contacts or recent travel. The patient states that he did get his haircut two weeks ago and feels that have exacerbated it. There  is no tenderness or pain.         Review of Systems   Review of Systems   Constitutional:  Negative for activity change, appetite change, chills, diaphoresis, fatigue and fever.   HENT:  Negative for facial swelling, sore throat and trouble swallowing.    Respiratory:  Negative for chest tightness, shortness of breath, wheezing and stridor.    Cardiovascular:  Negative for chest pain and palpitations.   Skin:  Positive for rash.   Allergic/Immunologic: Negative for environmental allergies, food allergies and immunocompromised state.   Neurological:  Negative for dizziness and light-headedness.   Hematological:  Negative for adenopathy. Does not bruise/bleed easily.         Current Medications       Current Outpatient Medications:     desonide (VERDESO) 0.05 % foam, Apply topically in the morning for 7 days, Disp: 100 g, Rfl: 0    Diclofenac Sodium (VOLTAREN) 1 %, Apply 2 g topically 4 (four) times a day To affected area, Disp: 100 g, Rfl: 1    ketoconazole (NIZORAL) 2 % shampoo, Apply 1 Application topically 2 (two) times a week, Disp: 8 mL, Rfl: 0    Multiple Vitamin (multivitamin) tablet, Take 1 tablet by mouth daily, Disp: , Rfl:     prednisoLONE acetate (PRED FORTE) 1 % ophthalmic suspension, INSTILL 1 DROP INTO AFFECTED EYE(S) FOUR TIMES A DAY, Disp: , Rfl:     naproxen (NAPROSYN) 500 mg tablet, Take 1 tablet (500 mg total) by mouth 2 (two) times a day with meals for 7 days, Disp: 14 tablet, Rfl: 0    Current Allergies     Allergies as of 08/22/2024    (No Known Allergies)            The following portions of the patient's history were reviewed and updated as appropriate: allergies, current medications, past family history, past medical history, past social history, past surgical history and problem list.     Past Medical History:   Diagnosis Date    Anemia     GERD (gastroesophageal reflux disease)        Past Surgical History:   Procedure Laterality Date    DENTAL SURGERY      LASIK      NO PAST SURGERIES    "   WISDOM TOOTH EXTRACTION         Family History   Problem Relation Age of Onset    Diabetes Mother     Diabetes Father     Cancer Father     Diabetes Paternal Aunt          Medications have been verified.        Objective   /78   Pulse (!) 50   Temp 98 °F (36.7 °C)   Resp 18   Ht 5' 7\" (1.702 m)   Wt 79 kg (174 lb 3.2 oz)   SpO2 100%   BMI 27.28 kg/m²   No LMP for male patient.       Physical Exam     Physical Exam  Vitals and nursing note reviewed.   Constitutional:       General: He is not in acute distress.     Appearance: He is well-developed. He is not diaphoretic.   HENT:      Mouth/Throat:      Mouth: Oropharynx is clear and moist and mucous membranes are normal.      Pharynx: Uvula midline.   Cardiovascular:      Rate and Rhythm: Normal rate and regular rhythm.      Pulses: Normal pulses.      Heart sounds: Normal heart sounds, S1 normal and S2 normal. No murmur heard.  Pulmonary:      Effort: Pulmonary effort is normal. No tachypnea, accessory muscle usage or respiratory distress.      Breath sounds: Normal breath sounds. No stridor. No decreased breath sounds, wheezing, rhonchi or rales.   Skin:     Capillary Refill: Capillary refill takes less than 2 seconds.      Findings: Rash present. Rash is maculopapular and papular (There are tiny erythematous papules on an erythematous base over the nape of the neck, behind his ears and occipital scalp. No pustules or vesicles. No oozing or drainage. No obvious crust or annular lesions.).                   "

## 2024-08-22 NOTE — PATIENT INSTRUCTIONS
Seborrheic Dermatitis:   -We discussed that his presentation is consistent with seborrheic dermatitis.  -Ketoconazole 2% cream to be applied to the affected areas as prescribed   -Will prescribe Desonide foam to be applied once daily to the affected areas for one week to reduce redness, irritation, itching  -We discussed to avoid going very short at the ca as this may be irritating to the rash   -If these measures do not improve the rash or cause remission of the rash Dermatology referral is advised

## 2024-08-26 ENCOUNTER — APPOINTMENT (OUTPATIENT)
Dept: PHYSICAL THERAPY | Facility: CLINIC | Age: 49
End: 2024-08-26
Payer: COMMERCIAL

## 2024-09-13 ENCOUNTER — EVALUATION (OUTPATIENT)
Dept: PHYSICAL THERAPY | Facility: CLINIC | Age: 49
End: 2024-09-13
Payer: COMMERCIAL

## 2024-09-13 DIAGNOSIS — R29.898 WEAKNESS OF BOTH HIPS: ICD-10-CM

## 2024-09-13 DIAGNOSIS — M54.50 LUMBAR BACK PAIN: ICD-10-CM

## 2024-09-13 DIAGNOSIS — M47.816 LUMBAR SPONDYLOSIS: Primary | ICD-10-CM

## 2024-09-13 DIAGNOSIS — R29.3 POOR POSTURE: ICD-10-CM

## 2024-09-13 PROCEDURE — 97110 THERAPEUTIC EXERCISES: CPT

## 2024-09-13 PROCEDURE — 97112 NEUROMUSCULAR REEDUCATION: CPT

## 2024-09-13 NOTE — LETTER
2024    Ines Oliveira MD  [address] [facility]  [address] [place] [id number]    Patient: José Miguel Lazaro   YOB: 1975   Date of Visit: 2024     Encounter Diagnosis     ICD-10-CM    1. Lumbar spondylosis  M47.816       2. Poor posture  R29.3       3. Lumbar back pain  M54.50       4. Weakness of both hips  R29.898           Dear Dr. Oliveira:    Thank you for your recent referral of José Miguel Lazaro. Please review the attached evaluation summary from José Miguel's recent visit.     Please verify that you agree with the plan of care by signing the attached order.     If you have any questions or concerns, please do not hesitate to call.     I sincerely appreciate the opportunity to share in the care of one of your patients and hope to have another opportunity to work with you in the near future.       Sincerely,    Lavonne Quintero, PT      Referring Provider:      I certify that I have read the below Plan of Care and certify the need for these services furnished under this plan of treatment while under my care.                    Ines Oliveira MD  [address] [facility]  [address] [place] [address]  Via Fax: [phone]          PT Re-Evaluation     Today's date: 2024  Patient name: José Miguel Lazaro  : 1975  MRN: [id number]  Referring provider: Ines Oliveira MD  Dx:   Encounter Diagnosis     ICD-10-CM    1. Lumbar spondylosis  M47.816       2. Poor posture  R29.3       3. Lumbar back pain  M54.50       4. Weakness of both hips  R29.898             Start Time: 0800  Stop Time: 0845  Total time in clinic (min): 45 minutes    Assessment  Impairments: abnormal or restricted ROM, activity intolerance, impaired physical strength, lacks appropriate home exercise program, pain with function, poor posture , poor body mechanics, participation limitations and activity limitations    Assessment details: José Miguel Lazaro is a 49 y.o. male presenting with bilateral low  back pain that has recently been increasing in intensity, especially in the mornings. At re-evaluation, José Miguel has completed 7 visits of physical therapy, however last visit was about a month ago. Over the past month, pt has been less consistent with his home exercise program, however has shown great retention of improved lifting mechanics. Pt does not currently present with radicular symptoms. At re-evaluation, pt presents with decreased spinal ROM and segmental mobility, decreased hip strength, decreased hip ROM, postural dysfunction, and pain. Pt has made improvement in tolerance to work related activities and lifting, however continues have functional limitations including transitional movements and prolonged positioning. Educated pt in consistency with HEP and lifting mechanics. Plan to resume skilled physical therapy to continue focusing on pain reduction, segmental mobility, and functional strength. José Miguel Lazaro would benefit from skilled physical therapy services to address aforementioned impairments, reduce pain, and promote return to PLOF without limitation.       Understanding of Dx/Px/POC: good     Prognosis: good    Goals  STG 2-4 weeks - in progress  1. Patient will be I with HEP   2. Patient will demonstrate correct squat mechanics without cueing from PT.   3. Patient will be able to experience improvement of symptoms upon waking in under 1 hour.  4. Patient will demonstrate good standing posture without cueing from PT.    LTG 6-8 weeks - in progress   1. Patient will be able to lift at least 25lbs off the ground with proper mechanics and pain < 2/10  2. Patient will be able to transition from supine or sitting to standing with pain <2/10  3. Patient will be able to return to regular exercise activity without limitation.   4. Patient will be able to sit for at least 1 hour with pain remaining < 2/10 to maximize ability to meet work requirements.      Plan  Patient would benefit from: skilled  physical therapy and PT eval  Planned modality interventions: thermotherapy: hydrocollator packs, cryotherapy and electrical stimulation/Russian stimulation    Planned therapy interventions: abdominal trunk stabilization, activity modification, body mechanics training, flexibility, home exercise program, therapeutic activities, therapeutic exercise, stretching, strengthening, patient/caregiver education, neuromuscular re-education, manual therapy, kinesiology taping and joint mobilization    Frequency: 1-2x/week.  Duration in weeks: 8  Plan of Care beginning date: 2024  Plan of Care expiration date: 2024  Treatment plan discussed with: patient  Plan details: HEP development, stretching, strengthening, A/AA/PROM, joint mobilizations, posture education, STM/MI as needed to reduce muscle tension, muscle reeducation, PLOC discussed and agreed upon with patient.          Subjective Evaluation    History of Present Illness  Mechanism of injury: José Miguel Lazaro reports recurrence of low back pain with increasing intensity especially upon waking up in the morning. Pt states pain was much less when he was consistent with coming to PT. Pt reports pain continues be across the whole lower back, however denies any numbness or pain down either leg. Pt states pain typically increases when he is in one position for too long. Pt states lifting, driving, and work related activity have been okay. Pt states he hasn't been consistent with his previous HEP, however he has been focused on his lifting mechanics and feels it has really helped him.          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    Lumbar Spine   Increased lordosis.     Pelvis   Anterior pelvic tilt    Comments  Standing posture: hips and pelvis pushed forward    Postural Observations  Seated posture: fair  Standing posture: fair      Palpation   Left   No palpable tenderness to the quadratus lumborum.   Hypertonic in the erector spinae, lumbar paraspinals and quadratus lumborum.   Tenderness of the erector spinae and lumbar paraspinals.     Right   No palpable tenderness to the erector spinae and quadratus lumborum.   Hypertonic in the erector spinae, lumbar paraspinals and quadratus lumborum.   Tenderness of the lumbar paraspinals.     Tenderness     Lumbar Spine  Tenderness in the facet joint.     Left Hip   No tenderness in the PSIS.     Right Hip   No tenderness in the PSIS.     Additional Tenderness Details  L4-5 tenderness bilaterally     Active Range of Motion     Lumbar   Flexion:  Restriction level: moderate  Extension:  Restriction level: minimal  Left lateral flexion:  Restriction level: minimal  Right lateral flexion:  Restriction level: minimal    Additional Active Range of Motion Details  Lumbar extension primarily generated from L4-5 segment, significantly reduced segmental mobility     Joint Play     Hypermobile: L4 and L5     Hypomobile: L1, L2, L3 and S1     Pain: L4 and L5   L5 comments: increased mobility compared to above and below segments  Mechanical Assessment    Cervical      Thoracic      Lumbar    Standing extension: repeated movements  Pain location: no change  Pain intensity: better  Pain level: decreased    Strength/Myotome Testing     Left Hip   Planes of Motion   Flexion: 4  External rotation: 4  Internal rotation: 4    Right Hip   Planes of Motion   Flexion: 4  External rotation: 4  Internal rotation:  4    Left Knee   Flexion: 4+  Extension: 4+    Right Knee   Flexion: 4+  Extension: 4+    Left Ankle/Foot   Dorsiflexion: 5    Right Ankle/Foot   Dorsiflexion: 5    Tests     Lumbar     Left   Negative passive SLR.     Right   Negative passive SLR.     Ambulation     Observational Gait   Stride length within functional limits. Decreased walking speed.     Quality of Movement During Gait     Pelvis  Anterior pelvic tilt.     Additional Quality of Movement During Gait Details  Decreased postural sway     Functional Assessment        Comments  Squat: improved trunk posture and abdominal activation, wide base of support, decreased hip flexion     General Comments:      Hip Comments   Moderate restrictions bilaterally into hip IR and ER              Precautions: none     Access Code: [email or web address]  URL: [email or web address]  Date: 09/13/2024  Prepared by: Lavonne Quintero    Exercises  - Supine Lower Trunk Rotation  - 1 x daily - 7 x weekly - 2 sets - 10 reps  - Supine Gluteus Stretch  - 1 x daily - 7 x weekly - 1 sets - 4 reps - 30sec hold  - Supine Posterior Pelvic Tilt  - 1 x daily - 7 x weekly - 2 sets - 10 reps - 5sec hold  - Supine Bridge with Mini Swiss Ball Between Knees  - 1 x daily - 7 x weekly - 2 sets - 10 reps - 5sec hold     Insurance:  A/CMS Eval/ Re-eval Auth #/ Referral # Total units or visits Start date  Expiration date KX? Visit limitation?  PT only or  PT+OT? Co-Insurance   CMS 7/9/24 [id number]  12V 7/9 10/9  BOMN  No copay, no coins    9/13             POC Start Date POC Expiration Date Signed POC?   7/9/24 9/3/24 pending   9/13/24 11/8/24      [id number]  Date 7/9 7/16 7/19 7/24 7/30 8/6 9/13        Visits/Units:  Used 1 2 3 4 5 6 7        [employer]hed: 12 visits  Remaining  11 10 9 8 7 6 5                 9/13 8/6 7/30 7/24 7/19 7/16 7/9    Manuals 7 6 5 4 3 2 IE   Lumbar PA mobs   Gr III    Gr II-III     STM      R QL, R lumbar paraspinals    Lumbar traction  Performed MS                     Neuro Re-Ed          Hooklying TA activation 10x reviewed    20x 5s hold  20x 5sec hold w/ add iso  20x 5sec hold  10x w/ 5sec hold with tactile cueing    Bridge with add iso  2x10 5s hold 2x10 5s hold  2x10 5s hold 2x10 5s hold  2x10 5s hold  2x10 5s hold 10x w/ 5sec hold   STS      15x tactile and verbal cueing for mechanics  Training 10x with multimodal cueing    squats 2x10 10lb counter balance  2x10 10lb counter balance  2x12 4lb counter balance  2x10 3lb counter balance  2x10 w/ cueing for hip hinge     Hip hinge    Training w/ dowel 10x  Training w/ dowel and TB resistance at hips      Standing rows     [place] 8.4 2x15     Pallof press   W/ walkouts in mini squat huey 5.2 10x ea way   W/ walkouts in mini squat [place] 3.5  W/ walkouts in mini squat [place] 3.0  2x15 [place] 5.0      Supine marches   Table top 2x10  Table top 2x10  2x10 cues for TA activation       Hip extension  Table top 2x10  Table top to train glute activation and minimize lumbar ext use 2x10        Lifting mechanics   35lbs from floor, cues for equal weight and hip hinge         Plank   4x20s hold        Ther Ex          HEP Reviewed     Reviewed + updated  Reviewed + updated - hold cross over stretch, knee rocks only to R  Initiated    Education Re-eval    Positioning at work, biomechanics    Posture, positioning for driving, biomechanics, POC    Cross over glute stretch       3x30s ea  2x30s ea   LTR 20x 5s hold  20x ea direction 5s hold  20x ea direction 5s hold  20x ea direction 20x ea direction    20x to R only     20x ea     10x R only - pain reduction  10x   Piriformis stretch   PT assisted 2x30s hold     3x30s ea    Modified per str   3x30s ea side PT assist    1x30s ea side     Open book stretch      Laying on R side 10x 5s hold      DKTC    10x w/ overpressure - improved                           Ther Activity                              Gait Training                              Modalities

## 2024-09-13 NOTE — PROGRESS NOTES
PT Re-Evaluation     Today's date: 2024  Patient name: José Miguel Lazaro  : 1975  MRN: [id number]  Referring provider: Ines Oliveira MD  Dx:   Encounter Diagnosis     ICD-10-CM    1. Lumbar spondylosis  M47.816       2. Poor posture  R29.3       3. Lumbar back pain  M54.50       4. Weakness of both hips  R29.898             Start Time: 0800  Stop Time: 0845  Total time in clinic (min): 45 minutes    Assessment  Impairments: abnormal or restricted ROM, activity intolerance, impaired physical strength, lacks appropriate home exercise program, pain with function, poor posture , poor body mechanics, participation limitations and activity limitations    Assessment details: José Miguel Lazaro is a 49 y.o. male presenting with bilateral low back pain that has recently been increasing in intensity, especially in the mornings. At re-evaluation, José Miguel has completed 7 visits of physical therapy, however last visit was about a month ago. Over the past month, pt has been less consistent with his home exercise program, however has shown great retention of improved lifting mechanics. Pt does not currently present with radicular symptoms. At re-evaluation, pt presents with decreased spinal ROM and segmental mobility, decreased hip strength, decreased hip ROM, postural dysfunction, and pain. Pt has made improvement in tolerance to work related activities and lifting, however continues have functional limitations including transitional movements and prolonged positioning. Educated pt in consistency with HEP and lifting mechanics. Plan to resume skilled physical therapy to continue focusing on pain reduction, segmental mobility, and functional strength. José Miguel Lazaro would benefit from skilled physical therapy services to address aforementioned impairments, reduce pain, and promote return to PLOF without limitation.       Understanding of Dx/Px/POC: good     Prognosis: good    Goals  STG 2-4 weeks - in  progress  1. Patient will be I with HEP   2. Patient will demonstrate correct squat mechanics without cueing from PT.   3. Patient will be able to experience improvement of symptoms upon waking in under 1 hour.  4. Patient will demonstrate good standing posture without cueing from PT.    LTG 6-8 weeks - in progress   1. Patient will be able to lift at least 25lbs off the ground with proper mechanics and pain < 2/10  2. Patient will be able to transition from supine or sitting to standing with pain <2/10  3. Patient will be able to return to regular exercise activity without limitation.   4. Patient will be able to sit for at least 1 hour with pain remaining < 2/10 to maximize ability to meet work requirements.      Plan  Patient would benefit from: skilled physical therapy and PT eval  Planned modality interventions: thermotherapy: hydrocollator packs, cryotherapy and electrical stimulation/Russian stimulation    Planned therapy interventions: abdominal trunk stabilization, activity modification, body mechanics training, flexibility, home exercise program, therapeutic activities, therapeutic exercise, stretching, strengthening, patient/caregiver education, neuromuscular re-education, manual therapy, kinesiology taping and joint mobilization    Frequency: 1-2x/week.  Duration in weeks: 8  Plan of Care beginning date: 9/13/2024  Plan of Care expiration date: 11/8/2024  Treatment plan discussed with: patient  Plan details: HEP development, stretching, strengthening, A/AA/PROM, joint mobilizations, posture education, STM/MI as needed to reduce muscle tension, muscle reeducation, PLOC discussed and agreed upon with patient.          Subjective Evaluation    History of Present Illness  Mechanism of injury: José Miguel Lazaro reports recurrence of low back pain with increasing intensity especially upon waking up in the morning. Pt states pain was much less when he was consistent with coming to PT. Pt reports pain continues be  across the whole lower back, however denies any numbness or pain down either leg. Pt states pain typically increases when he is in one position for too long. Pt states lifting, driving, and work related activity have been okay. Pt states he hasn't been consistent with his previous HEP, however he has been focused on his lifting mechanics and feels it has really helped him.          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    Lumbar Spine   Increased lordosis.     Pelvis   Anterior pelvic tilt    Comments  Standing posture: hips and pelvis pushed forward    Postural Observations  Seated posture: fair  Standing posture: fair      Palpation   Left   No palpable tenderness to the quadratus lumborum.   Hypertonic in the erector spinae, lumbar paraspinals and quadratus lumborum.   Tenderness of the erector spinae and lumbar paraspinals.     Right   No palpable tenderness to the erector spinae and quadratus lumborum.   Hypertonic in the erector spinae, lumbar paraspinals and quadratus lumborum.   Tenderness of the lumbar paraspinals.     Tenderness     Lumbar Spine  Tenderness in the facet joint.     Left Hip   No tenderness in the PSIS.     Right Hip   No tenderness in the PSIS.     Additional Tenderness Details  L4-5 tenderness bilaterally     Active Range of Motion     Lumbar   Flexion:  Restriction level: moderate  Extension:  Restriction level: minimal  Left lateral flexion:   Restriction level: minimal  Right lateral flexion:  Restriction level: minimal    Additional Active Range of Motion Details  Lumbar extension primarily generated from L4-5 segment, significantly reduced segmental mobility     Joint Play     Hypermobile: L4 and L5     Hypomobile: L1, L2, L3 and S1     Pain: L4 and L5   L5 comments: increased mobility compared to above and below segments  Mechanical Assessment    Cervical      Thoracic      Lumbar    Standing extension: repeated movements  Pain location: no change  Pain intensity: better  Pain level: decreased    Strength/Myotome Testing     Left Hip   Planes of Motion   Flexion: 4  External rotation: 4  Internal rotation: 4    Right Hip   Planes of Motion   Flexion: 4  External rotation: 4  Internal rotation: 4    Left Knee   Flexion: 4+  Extension: 4+    Right Knee   Flexion: 4+  Extension: 4+    Left Ankle/Foot   Dorsiflexion: 5    Right Ankle/Foot   Dorsiflexion: 5    Tests     Lumbar     Left   Negative passive SLR.     Right   Negative passive SLR.     Ambulation     Observational Gait   Stride length within functional limits. Decreased walking speed.     Quality of Movement During Gait     Pelvis  Anterior pelvic tilt.     Additional Quality of Movement During Gait Details  Decreased postural sway     Functional Assessment        Comments  Squat: improved trunk posture and abdominal activation, wide base of support, decreased hip flexion     General Comments:      Hip Comments   Moderate restrictions bilaterally into hip IR and ER              Precautions: none     Access Code: [email or web address]  URL: [email or web address]  Date: 09/13/2024  Prepared by: Lavonne Quintero    Exercises  - Supine Lower Trunk Rotation  - 1 x daily - 7 x weekly - 2 sets - 10 reps  - Supine Gluteus Stretch  - 1 x daily - 7 x weekly - 1 sets - 4 reps - 30sec hold  - Supine Posterior Pelvic Tilt  - 1 x daily - 7 x weekly - 2 sets - 10 reps - 5sec hold  - Supine Bridge with Mini  Swiss Ball Between Knees  - 1 x daily - 7 x weekly - 2 sets - 10 reps - 5sec hold     Insurance:  AMA/CMS Eval/ Re-eval Auth #/ Referral # Total units or visits Start date  Expiration date KX? Visit limitation?  PT only or  PT+OT? Co-Insurance   CMS 7/9/24 [id number]  12V 7/9 10/9  BOMN  No copay, no coins    9/13             POC Start Date POC Expiration Date Signed POC?   7/9/24 9/3/24 pending   9/13/24 11/8/24      [id number]  Date 7/9 7/16 7/19 7/24 7/30 8/6 9/13        Visits/Units:  Used 1 2 3 4 5 6 7        Authed: 12 visits  Remaining  11 10 9 8 7 6 5                 9/13 8/6 7/30 7/24 7/19 7/16 7/9    Manuals 7 6 5 4 3 2 IE   Lumbar PA mobs   Gr III    Gr II-III     STM      R QL, R lumbar paraspinals    Lumbar traction  Performed MS                    Neuro Re-Ed          Hooklying TA activation 10x reviewed    20x 5s hold  20x 5sec hold w/ add iso  20x 5sec hold  10x w/ 5sec hold with tactile cueing    Bridge with add iso  2x10 5s hold 2x10 5s hold  2x10 5s hold 2x10 5s hold  2x10 5s hold  2x10 5s hold 10x w/ 5sec hold   STS      15x tactile and verbal cueing for mechanics  Training 10x with multimodal cueing    squats 2x10 10lb counter balance  2x10 10lb counter balance  2x12 4lb counter balance  2x10 3lb counter balance  2x10 w/ cueing for hip hinge     Hip hinge    Training w/ dowel 10x  Training w/ dowel and TB resistance at hips      Standing rows     [place] 8.4 2x15     Pallof press   W/ walkouts in mini squat rishi 5.2 10x ea way   W/ walkouts in mini squat rishi 3.5  W/ walkouts in mini squat rishi 3.0  2x15 Rishi 5.0      Supine marches   Table top 2x10  Table top 2x10  2x10 cues for TA activation       Hip extension  Table top 2x10  Table top to train glute activation and minimize lumbar ext use 2x10        Lifting mechanics   35lbs from floor, cues for equal weight and hip hinge         Plank   4x20s hold        Ther Ex          HEP Reviewed     Reviewed + updated  Reviewed + updated - hold  cross over stretch, knee rocks only to R  Initiated    Education Re-eval    Positioning at work, biomechanics    Posture, positioning for driving, biomechanics, POC    Cross over glute stretch       3x30s ea  2x30s ea   LTR 20x 5s hold  20x ea direction 5s hold  20x ea direction 5s hold  20x ea direction 20x ea direction    20x to R only     20x ea     10x R only - pain reduction  10x   Piriformis stretch   PT assisted 2x30s hold     3x30s ea    Modified per str   3x30s ea side PT assist    1x30s ea side     Open book stretch      Laying on R side 10x 5s hold      DKTC    10x w/ overpressure - improved                           Ther Activity                              Gait Training                              Modalities

## 2024-09-18 ENCOUNTER — TELEPHONE (OUTPATIENT)
Age: 49
End: 2024-09-18

## 2024-09-20 ENCOUNTER — OFFICE VISIT (OUTPATIENT)
Dept: PHYSICAL THERAPY | Facility: CLINIC | Age: 49
End: 2024-09-20
Payer: COMMERCIAL

## 2024-09-20 DIAGNOSIS — R29.3 POOR POSTURE: ICD-10-CM

## 2024-09-20 DIAGNOSIS — M47.816 LUMBAR SPONDYLOSIS: Primary | ICD-10-CM

## 2024-09-20 DIAGNOSIS — M54.50 LUMBAR BACK PAIN: ICD-10-CM

## 2024-09-20 DIAGNOSIS — R29.898 WEAKNESS OF BOTH HIPS: ICD-10-CM

## 2024-09-20 PROCEDURE — 97112 NEUROMUSCULAR REEDUCATION: CPT

## 2024-09-20 PROCEDURE — 97110 THERAPEUTIC EXERCISES: CPT

## 2024-09-20 NOTE — PROGRESS NOTES
Daily Note     Today's date: 2024  Patient name: José Miguel Lazaro  : 1975  MRN: [id number]  Referring provider: Owen Trujillo MD  Dx:   Encounter Diagnosis     ICD-10-CM    1. Lumbar spondylosis  M47.816       2. Poor posture  R29.3       3. Lumbar back pain  M54.50       4. Weakness of both hips  R29.898           Start Time: 0800  Stop Time: 0845  Total time in clinic (min): 45 minutes    Subjective: José Miguel reports experiencing back pain while at work upon standing up from being bent over for prolonged periods of time. Pt states he had some pain lifting his work bag.      Objective: See treatment diary below      Assessment: Tolerated treatment well. Focused session on segmental spinal mobility and re-introduction of core stabilization exercises. Pt required frequent tactile and verbal cueing to reduce use of lumbar extensors for hip extension and engage glutes to generate movement. Increased repetitions of squats today to focus on core stabilization/postural endurance with pt requiring intermittent cueing to correct mechanics with fatigue. Patient demonstrated fatigue post treatment, exhibited good technique with therapeutic exercises, and would benefit from continued PT      Plan: Continue per plan of care.  Progress treatment as tolerated.       Precautions: none     Access Code: [email or web address]  URL: [email or web address]  Date: 2024  Prepared by: Lavonne Quintero    Exercises  - Supine Lower Trunk Rotation  - 1 x daily - 7 x weekly - 2 sets - 10 reps  - Supine Gluteus Stretch  - 1 x daily - 7 x weekly - 1 sets - 4 reps - 30sec hold  - Supine Posterior Pelvic Tilt  - 1 x daily - 7 x weekly - 2 sets - 10 reps - 5sec hold  - Supine Bridge with Mini Swiss Ball Between Knees  - 1 x daily - 7 x weekly - 2 sets - 10 reps - 5sec hold     Insurance:  AMA/CMS Eval/ Re-eval Auth #/ Referral # Total units or visits Start date  Expiration date KX? Visit limitation?  PT only or  PT+OT?  Co-Insurance   CMS 7/9/24 [id number]  12V 7/9 10/9  BOMN  No copay, no coins    9/13             POC Start Date POC Expiration Date Signed POC?   7/9/24 9/3/24 pending   9/13/24 11/8/24      [id number]  Date 7/9 7/16 7/19 7/24 7/30 8/6 9/13 9/20       Visits/Units:  Used 1 2 3 4 5 6 7 8       Authed: 12 visits  Remaining  11 10 9 8 7 6 5 4                9/20 9/13 8/6 7/30 7/24 7/19   Manuals 8 7 6 5 4 3   Lumbar PA mobs    Gr III      STM         Lumbar traction   Performed MS                 Neuro Re-Ed         Hooklying TA activation  10x reviewed    20x 5s hold  20x 5sec hold w/ add iso    Bridge with add iso  2x10 5s hold  2x10 5s hold 2x10 5s hold  2x10 5s hold 2x10 5s hold  2x10 5s hold    STS         squats 2x15 7lb counter balance  2x10 10lb counter balance  2x10 10lb counter balance  2x12 4lb counter balance  2x10 3lb counter balance  2x10 w/ cueing for hip hinge   Hip hinge     Training w/ dowel 10x  Training w/ dowel and TB resistance at hips    Standing rows      Rishi 8.4 2x15   Pallof press  4.5 in mini squat 2x10   W/ walkouts in mini squat rishi 5.2 10x ea way   W/ walkouts in mini squat rishi 3.5  W/ walkouts in mini squat rishi 3.0  2x15 [place] 5.0    Supine marches  2x10   Table top 2x10  Table top 2x10  2x10 cues for TA activation     Hip extension Table top 2x10 with cues to minimize use of lumbar extensors   Table top 2x10  Table top to train glute activation and minimize lumbar ext use 2x10      Lifting mechanics    35lbs from floor, cues for equal weight and hip hinge       Plank  On knees 3x30s hold   4x20s hold      Ther Ex         HEP  Reviewed     Reviewed + updated    Education  Re-eval    Positioning at work, biomechanics     Cross over glute stretch          LTR 20x 5s hold  20x 5s hold  20x ea direction 5s hold  20x ea direction 5s hold  20x ea direction 20x ea direction    20x to R only       Piriformis stretch  PT assisted 3x30s hold   PT assisted 2x30s hold       Modified  per str    3x30s ea side PT assist      Open book stretch       Laying on R side 10x 5s hold    DKTC Single knee to chest PT assisted 3x30s     10x w/ overpressure - improved     KALEB 10x         Prone press up 10x emphasizing segmental mobility         Ther Activity                           Gait Training                           Modalities

## 2024-09-23 ENCOUNTER — APPOINTMENT (OUTPATIENT)
Dept: PHYSICAL THERAPY | Facility: CLINIC | Age: 49
End: 2024-09-23
Payer: COMMERCIAL

## 2024-09-27 ENCOUNTER — APPOINTMENT (OUTPATIENT)
Dept: PHYSICAL THERAPY | Facility: CLINIC | Age: 49
End: 2024-09-27
Payer: COMMERCIAL

## 2024-09-30 ENCOUNTER — OFFICE VISIT (OUTPATIENT)
Dept: PHYSICAL THERAPY | Facility: CLINIC | Age: 49
End: 2024-09-30
Payer: COMMERCIAL

## 2024-09-30 DIAGNOSIS — R29.898 WEAKNESS OF BOTH HIPS: ICD-10-CM

## 2024-09-30 DIAGNOSIS — M47.816 LUMBAR SPONDYLOSIS: Primary | ICD-10-CM

## 2024-09-30 DIAGNOSIS — M54.50 LUMBAR BACK PAIN: ICD-10-CM

## 2024-09-30 DIAGNOSIS — R29.3 POOR POSTURE: ICD-10-CM

## 2024-09-30 PROCEDURE — 97110 THERAPEUTIC EXERCISES: CPT

## 2024-09-30 PROCEDURE — 97112 NEUROMUSCULAR REEDUCATION: CPT

## 2024-09-30 NOTE — PROGRESS NOTES
Daily Note     Today's date: 2024  Patient name: José Miguel Lazaro  : 1975  MRN: [id number]  Referring provider: Owen Trujillo MD  Dx:   Encounter Diagnosis     ICD-10-CM    1. Lumbar spondylosis  M47.816       2. Poor posture  R29.3       3. Lumbar back pain  M54.50       4. Weakness of both hips  R29.898           Start Time: 0800  Stop Time: 0845  Total time in clinic (min): 45 minutes    Subjective: José Miguel reports his back continues to feel a bit stiff in the mornings and sore when lifting objects at work.       Objective: See treatment diary below      Assessment: Tolerated treatment well. Focused session on dynamic core stabilization with pt requiring intermittent cueing to re-establish appropriate engagement as he became fatigued. Introduced suitcase deadlift today to challenge lifting mechanics with offset load; required tactile cueing to engage obliques and verbal cueing for hip hinge. Patient demonstrated fatigue post treatment, exhibited good technique with therapeutic exercises, and would benefit from continued PT      Plan: Continue per plan of care.  Progress treatment as tolerated.       Precautions: none     Access Code: [email or web address]  URL: [email or web address]  Date: 2024  Prepared by: Lavonne Quintero    Exercises  - Supine Lower Trunk Rotation  - 1 x daily - 7 x weekly - 2 sets - 10 reps  - Supine Gluteus Stretch  - 1 x daily - 7 x weekly - 1 sets - 4 reps - 30sec hold  - Supine Posterior Pelvic Tilt  - 1 x daily - 7 x weekly - 2 sets - 10 reps - 5sec hold  - Supine Bridge with Mini Swiss Ball Between Knees  - 1 x daily - 7 x weekly - 2 sets - 10 reps - 5sec hold     Insurance:  AMA/CMS Eval/ Re-eval Auth #/ Referral # Total units or visits Start date  Expiration date KX? Visit limitation?  PT only or  PT+OT? Co-Insurance   CMS 24 [id number]  12V 7/9 10/9  BOMN  No copay, no coins                 POC Start Date POC Expiration Date Signed POC?   7/9/24 9/3/24  "pending   9/13/24 11/8/24      [id number]  Date 7/9 7/16 7/19 7/24 7/30 8/6 9/13 9/20 9/30      Visits/Units:  Used 1 2 3 4 5 6 7 8 9      Authed: 12 visits  Remaining  11 10 9 8 7 6 5 4 3               9/30 9/20 9/13 8/6 7/30 7/24 7/19   Manuals 9 8 7 6 5 4 3   Lumbar PA mobs     Gr III      STM          Lumbar traction    Performed MS                  Neuro Re-Ed          Hooklying TA activation   10x reviewed    20x 5s hold  20x 5sec hold w/ add iso    Bridge with add iso  3x10 5s hold  2x10 5s hold  2x10 5s hold 2x10 5s hold  2x10 5s hold 2x10 5s hold  2x10 5s hold    STS          squats 2x15 10lb counter balance 2x15 7lb counter balance  2x10 10lb counter balance  2x10 10lb counter balance  2x12 4lb counter balance  2x10 3lb counter balance  2x10 w/ cueing for hip hinge   Hip hinge      Training w/ dowel 10x  Training w/ dowel and TB resistance at hips    Standing rows       [place] 8.4 2x15   Pallof press  3.8 held w/ side stepping 10x ea side  4.5 in mini squat 2x10   W/ walkouts in mini squat hue[place] 5.2 10x ea way   W/ walkouts in mini squat huey 3.5  W/ walkouts in mini squat huey 3.0  2x15 [place] 5.0    Supine marches  Table top 2x15 2x10   Table top 2x10  Table top 2x10  2x10 cues for TA activation     Hip extension  Table top 2x10 with cues to minimize use of lumbar extensors   Table top 2x10  Table top to train glute activation and minimize lumbar ext use 2x10      Lifting mechanics     35lbs from floor, cues for equal weight and hip hinge       Plank  5x15s hold  On knees 3x30s hold   4x20s hold      Suitcase deadlift  25lbs off 6\" step 15x ea side          Ther Ex          HEP   Reviewed     Reviewed + updated    Education   Re-eval    Positioning at work, biomechanics     Cross over glute stretch           LTR 20x 5s hold 20x 5s hold  20x 5s hold  20x ea direction 5s hold  20x ea direction 5s hold  20x ea direction 20x ea direction    20x to R only       Piriformis stretch  PT assisted 3x30s  PT " assisted 3x30s hold   PT assisted 2x30s hold       Modified per str     3x30s ea side PT assist      Open book stretch        Laying on R side 10x 5s hold    DKTC  Single knee to chest PT assisted 3x30s     10x w/ overpressure - improved     KALEB  10x         Prone press up  10x emphasizing segmental mobility         Ther Activity                              Gait Training                              Modalities

## 2024-10-04 ENCOUNTER — APPOINTMENT (OUTPATIENT)
Dept: PHYSICAL THERAPY | Facility: CLINIC | Age: 49
End: 2024-10-04
Payer: COMMERCIAL

## 2024-10-23 ENCOUNTER — APPOINTMENT (OUTPATIENT)
Dept: PHYSICAL THERAPY | Facility: CLINIC | Age: 49
End: 2024-10-23
Payer: COMMERCIAL

## 2024-10-25 ENCOUNTER — OFFICE VISIT (OUTPATIENT)
Dept: PHYSICAL THERAPY | Facility: CLINIC | Age: 49
End: 2024-10-25
Payer: COMMERCIAL

## 2024-10-25 DIAGNOSIS — M54.50 LUMBAR BACK PAIN: ICD-10-CM

## 2024-10-25 DIAGNOSIS — R29.898 WEAKNESS OF BOTH HIPS: ICD-10-CM

## 2024-10-25 DIAGNOSIS — R29.3 POOR POSTURE: ICD-10-CM

## 2024-10-25 DIAGNOSIS — M47.816 LUMBAR SPONDYLOSIS: Primary | ICD-10-CM

## 2024-10-25 PROCEDURE — 97140 MANUAL THERAPY 1/> REGIONS: CPT

## 2024-10-25 PROCEDURE — 97112 NEUROMUSCULAR REEDUCATION: CPT

## 2024-10-25 PROCEDURE — 97110 THERAPEUTIC EXERCISES: CPT

## 2024-10-25 NOTE — PROGRESS NOTES
Daily Note     Today's date: 10/25/2024  Patient name: José Miguel Lazaro  : 1975  MRN: [id number]  Referring provider: Owen Trujillo MD  Dx:   Encounter Diagnosis     ICD-10-CM    1. Lumbar spondylosis  M47.816       2. Poor posture  R29.3       3. Lumbar back pain  M54.50       4. Weakness of both hips  R29.898           Start Time: 08  Stop Time: 08  Total time in clinic (min): 42 minutes    Subjective: José Miguel reports his back has been really bothering him recently and he has been struggling to find relief.      Objective: See treatment diary below      Assessment: Tolerated treatment well. Focused session on lumbar mobility and core stabilization exercises to tolerance with pt experiencing reduction of pain level by end of session. Patient exhibited good technique with therapeutic exercises and would benefit from continued PT      Plan: Continue per plan of care.  Progress treatment as tolerated.       Precautions: none     Access Code: [email or web address]  URL: [email or web address]  Date: 2024  Prepared by: Lavonne Quintero    Exercises  - Supine Lower Trunk Rotation  - 1 x daily - 7 x weekly - 2 sets - 10 reps  - Supine Gluteus Stretch  - 1 x daily - 7 x weekly - 1 sets - 4 reps - 30sec hold  - Supine Posterior Pelvic Tilt  - 1 x daily - 7 x weekly - 2 sets - 10 reps - 5sec hold  - Supine Bridge with Mini Swiss Ball Between Knees  - 1 x daily - 7 x weekly - 2 sets - 10 reps - 5sec hold     Insurance:  AMA/CMS Eval/ Re-eval Auth #/ Referral # Total units or visits Start date  Expiration date KX? Visit limitation?  PT only or  PT+OT? Co-Insurance   CMS 24 [id number]  12V 7/9 10/9  BOMN  No copay, no coins     pending pend pend pend         POC Start Date POC Expiration Date Signed POC?   7/9/24 9/3/24 pending   24      [id number]  Date  8/      Visits/Units:  Used 1 2 3 4 5 6 7 8 9      Authed: 12 visits  Remaining  11 10 9 8 7 6 5  "4 3        pending Date 10/25              Visits/Units:  Used 1              Authed: 12 visits  Remaining                         10/25 9/30 9/20 9/13 8/6 7/30 7/24 7/19   Manuals 10  9 8 7 6 5 4 3   Lumbar PA mobs Gr I-II     Gr III      STM           Lumbar traction     Performed MS                   Neuro Re-Ed           Hooklying TA activation 10x pelvic tilts    10x reviewed    20x 5s hold  20x 5sec hold w/ add iso    Bridge with add iso  2x10 5s hold  3x10 5s hold  2x10 5s hold  2x10 5s hold 2x10 5s hold  2x10 5s hold 2x10 5s hold  2x10 5s hold    STS           squats  2x15 10lb counter balance 2x15 7lb counter balance  2x10 10lb counter balance  2x10 10lb counter balance  2x12 4lb counter balance  2x10 3lb counter balance  2x10 w/ cueing for hip hinge   Hip hinge       Training w/ dowel 10x  Training w/ dowel and TB resistance at hips    Standing rows        [place] 8.4 2x15   Pallof press   3.8 held w/ side stepping 10x ea side  4.5 in mini squat 2x10   W/ walkouts in mini squat rishi 5.2 10x ea way   W/ walkouts in mini squat rishi 3.5  W/ walkouts in mini squat rishi 3.0  2x15 Rishi 5.0    Supine marches  2x10 Table top 2x15 2x10   Table top 2x10  Table top 2x10  2x10 cues for TA activation     Hip extension   Table top 2x10 with cues to minimize use of lumbar extensors   Table top 2x10  Table top to train glute activation and minimize lumbar ext use 2x10      Lifting mechanics      35lbs from floor, cues for equal weight and hip hinge       Plank   5x15s hold  On knees 3x30s hold   4x20s hold      Suitcase deadlift   25lbs off 6\" step 15x ea side          Ther Ex           HEP    Reviewed     Reviewed + updated    Education    Re-eval    Positioning at work, biomechanics     Cross over glute stretch            LTR 20x 5s hold 20x 5s hold 20x 5s hold  20x 5s hold  20x ea direction 5s hold  20x ea direction 5s hold  20x ea direction 20x ea direction    20x to R only       Piriformis stretch  PT assisted " 3x30s PT assisted 3x30s  PT assisted 3x30s hold   PT assisted 2x30s hold       Modified per str      3x30s ea side PT assist      Open book stretch  10x10s        Laying on R side 10x 5s hold    DKTC Single knee to chest PT assisted 3x30s   Single knee to chest PT assisted 3x30s     10x w/ overpressure - improved     KALEB   10x         Prone press up   10x emphasizing segmental mobility         Ther Activity                                 Gait Training                                 Modalities

## 2024-10-30 ENCOUNTER — TELEPHONE (OUTPATIENT)
Dept: OTHER | Facility: OTHER | Age: 49
End: 2024-10-30

## 2024-10-30 ENCOUNTER — TELEPHONE (OUTPATIENT)
Age: 49
End: 2024-10-30

## 2024-10-30 NOTE — TELEPHONE ENCOUNTER
----- Message from Owen Trujillo MD sent at 10/30/2024  2:23 PM EDT -----  Regarding: No show for acupuncture/myofascial  This patient No Showed for their recent acupuncture/myofascial procedure visit. When patients no show, they will no longer have the privilege of scheduling multiple future acupuncture visits, and any such visits already scheduled should be canceled, letting the patient know. They will need to schedule future acupuncture/myofascial visits one at a time within a few days of their scheduled visit. Let them know we are canceling any future acupuncture visits that had been held. Thank you, Dr. Trujillo

## 2024-10-30 NOTE — TELEPHONE ENCOUNTER
Patient called prior to appt to cancel, it was listed as a no show because it was within 2 hours of his appointment time. Do you still want is us to cancel any future appointments?

## 2024-10-30 NOTE — TELEPHONE ENCOUNTER
Patient is calling regarding cancelling an appointment.    Date/Time:10/30/2024/ 8:00 am    Patient was rescheduled: YES [] NO [x]    Patient requesting call back to reschedule: YES [] NO [x]    Patient stated he would call back to reschedule.

## 2024-10-31 ENCOUNTER — APPOINTMENT (OUTPATIENT)
Dept: PHYSICAL THERAPY | Facility: CLINIC | Age: 49
End: 2024-10-31
Payer: COMMERCIAL

## 2024-11-18 ENCOUNTER — OFFICE VISIT (OUTPATIENT)
Dept: ENDOCRINOLOGY | Facility: CLINIC | Age: 49
End: 2024-11-18
Payer: COMMERCIAL

## 2024-11-18 VITALS
HEIGHT: 67 IN | SYSTOLIC BLOOD PRESSURE: 120 MMHG | HEART RATE: 74 BPM | BODY MASS INDEX: 27.31 KG/M2 | DIASTOLIC BLOOD PRESSURE: 80 MMHG | OXYGEN SATURATION: 99 % | WEIGHT: 174 LBS

## 2024-11-18 DIAGNOSIS — H53.8 BLURRY VISION: ICD-10-CM

## 2024-11-18 DIAGNOSIS — R39.11 HESITANCY: ICD-10-CM

## 2024-11-18 DIAGNOSIS — E78.2 MIXED HYPERLIPIDEMIA: ICD-10-CM

## 2024-11-18 DIAGNOSIS — R73.03 PREDIABETES: Primary | ICD-10-CM

## 2024-11-18 LAB
SL AMB POCT GLUCOSE BLD: 99
SL AMB POCT HEMOGLOBIN AIC: 5.7 (ref ?–6.5)

## 2024-11-18 PROCEDURE — 82948 REAGENT STRIP/BLOOD GLUCOSE: CPT | Performed by: INTERNAL MEDICINE

## 2024-11-18 PROCEDURE — 99204 OFFICE O/P NEW MOD 45 MIN: CPT | Performed by: INTERNAL MEDICINE

## 2024-11-18 PROCEDURE — 83036 HEMOGLOBIN GLYCOSYLATED A1C: CPT | Performed by: INTERNAL MEDICINE

## 2024-11-18 RX ORDER — BLOOD SUGAR DIAGNOSTIC
STRIP MISCELLANEOUS
Qty: 100 EACH | Refills: 1 | Status: SHIPPED | OUTPATIENT
Start: 2024-11-18

## 2024-11-18 RX ORDER — BLOOD-GLUCOSE METER
EACH MISCELLANEOUS DAILY
Qty: 1 KIT | Refills: 0 | Status: SHIPPED | OUTPATIENT
Start: 2024-11-18

## 2024-11-18 RX ORDER — LANCETS
EACH MISCELLANEOUS
Qty: 100 EACH | Refills: 1 | Status: SHIPPED | OUTPATIENT
Start: 2024-11-18

## 2024-11-18 NOTE — PROGRESS NOTES
José Miguel Lazaro 49 y.o. male MRN: [id number]    Encounter: [id number]  Referring Provider  Referral Self  No address on file    Assessment & Plan     Pre-diabetes   Blurriness in vision   Point-of-care A1c 5.7%, stable as compared to before, point-of-care glucose 99 mg/dL  Currently not on any medications    Recommend the following at this time  Consistent car diet, exercise as discussed.  Will hold off on starting any medications  Glucometer, supplies ordered, advised patient to stagger check blood sugars before meals and at bedtime  If covered by insurance, would like to meet diabetes educator-referred  Follow up with ophthalmology regarding vision change    - Recommended a consistent carbohydrate diet   - weight control and exercise as discussed ( ideal is 30 min, at least 5 times a week)     3. Hyperlipidemia  Check fasting lipid panel    4.  Hesitancy in urination, dribbling-refer to urology  5. alcohol use-discussed decreasing intake/cessation, will check CMP      Follow up in 6 months     CC: Diabetes    History of Present Illness     HPI:  José Miguel Lazaro is a 49 y.o. male presents for a new visit regarding pre-diabetes management.   Also has a h/o hyperlipidemia    Diagnosed with pre-diabetes on labs 2022  FH: maternal grandmother and father with h/o diabetes mellitus     Occasional blurry vision and has tearing x 2 years, went to an eye doctor/ optometrist, did have a procedure to open the tear duct to help flow however it did not and they were help unable to determine etiology. Suggested to evaluate for diabetes mellitus   Blurriness worse  in the evening and needing to use bright light to see small things that he didn't need to earlier     Current regimen:  none     Statin: --    ACE-I/ARB:  --     Appetite is good. Denies recent weight gain/ loss.   Denies numbness or tingling in the hands or feet.   Occasional chest pain/ no SOB. No diarrhea/ constipation.   No polyuria/ polydipsia however feels that  if he drinks more water he need sto urinate often an in spurts - hesitancy , dribbling   Exercise: none     Home glucose monitoring:  no; does not have a glucometer   Occasional pain/discomfort on the left side of the flank X fe years; drinks half a pint of   Usually in there morning, gets better as the day goes by   No h/o pancreatitis     All other systems were reviewed and are negative.    Review of Systems      Historical Information   Past Medical History:   Diagnosis Date    Anemia     GERD (gastroesophageal reflux disease)      Past Surgical History:   Procedure Laterality Date    DENTAL SURGERY      LASIK      NO PAST SURGERIES      WISDOM TOOTH EXTRACTION       Social History   Social History     Substance and Sexual Activity   Alcohol Use Yes    Alcohol/week: 7.0 standard drinks of alcohol    Types: 7 Standard drinks or equivalent per week    Comment: varies. maybe five shooters a day, last drink couple days ago     Social History     Substance and Sexual Activity   Drug Use Yes    Types: Marijuana    Comment: once in a while     Social History     Tobacco Use   Smoking Status Every Day    Types: E-Cigarettes, Cigars   Smokeless Tobacco Never   Tobacco Comments    vapes, used to smoke cigarretes 0.25 ppd for 15-20 years     Family History:   Family History   Problem Relation Age of Onset    Diabetes Mother     Diabetes Father     Cancer Father     Diabetes Paternal Aunt        Meds/Allergies   Current Outpatient Medications   Medication Sig Dispense Refill    Multiple Vitamin (multivitamin) tablet Take 1 tablet by mouth daily      desonide (VERDESO) 0.05 % foam Apply topically in the morning for 7 days 100 g 0    Diclofenac Sodium (VOLTAREN) 1 % Apply 2 g topically 4 (four) times a day To affected area 100 g 1    ketoconazole (NIZORAL) 2 % shampoo Apply 1 Application topically 2 (two) times a week 8 mL 0    naproxen (NAPROSYN) 500 mg tablet Take 1 tablet (500 mg total) by mouth 2 (two) times a day with  "meals for 7 days 14 tablet 0    prednisoLONE acetate (PRED FORTE) 1 % ophthalmic suspension INSTILL 1 DROP INTO AFFECTED EYE(S) FOUR TIMES A DAY (Patient not taking: Reported on 11/18/2024)       No current facility-administered medications for this visit.     No Known Allergies    Objective   Vitals: Blood pressure 120/80, pulse 74, height 5' 7\" (1.702 m), weight 78.9 kg (174 lb), SpO2 99%.    Physical Exam  Constitutional:       General: He is not in acute distress.     Appearance: He is well-developed. He is not diaphoretic.   HENT:      Head: Normocephalic and atraumatic.   Eyes:      Conjunctiva/sclera: Conjunctivae normal.   Neck:      Comments: No thyromegaly   Nontender, no nodules appreciated on palpation     Cardiovascular:      Rate and Rhythm: Normal rate and regular rhythm.      Heart sounds: Normal heart sounds. No murmur heard.  Pulmonary:      Effort: Pulmonary effort is normal. No respiratory distress.      Breath sounds: Normal breath sounds. No wheezing.   Abdominal:      General: There is no distension.      Palpations: Abdomen is soft.      Tenderness: There is no abdominal tenderness. There is no guarding.   Musculoskeletal:      Cervical back: Normal range of motion and neck supple.   Skin:     General: Skin is warm and dry.      Findings: No erythema or rash.   Neurological:      Mental Status: He is alert and oriented to person, place, and time.   Psychiatric:         Behavior: Behavior normal.         Thought Content: Thought content normal.         The history was obtained from the review of the chart, patient.    Lab Results:  reviewed, last 7/2023              Imaging Studies: Results Review Statement: No pertinent imaging studies reviewed.    Portions of the record may have been created with voice recognition software. Occasional wrong word or \"sound a like\" substitutions may have occurred due to the inherent limitations of voice recognition software. Read the chart carefully and " recognize, using context, where substitutions have occurred.

## 2024-11-18 NOTE — PATIENT INSTRUCTIONS
Please have labs done as ordered, follow-up with your primary care physician  Recommend stagger checking blood sugars before meals, bedtime, at any time that you are not feeling well, check a few times when you have blurriness in vision     Recommend low-carb/consistent carbohydrate diet, exercise as tolerated    You are being referred to urology

## 2024-11-19 LAB
ALBUMIN SERPL-MCNC: 4.7 G/DL (ref 4.1–5.1)
ALP SERPL-CCNC: 58 IU/L (ref 44–121)
ALT SERPL-CCNC: 46 IU/L (ref 0–44)
AST SERPL-CCNC: 25 IU/L (ref 0–40)
BASOPHILS # BLD AUTO: 0 X10E3/UL (ref 0–0.2)
BASOPHILS NFR BLD AUTO: 1 %
BILIRUB SERPL-MCNC: 0.6 MG/DL (ref 0–1.2)
BUN SERPL-MCNC: 11 MG/DL (ref 6–24)
BUN/CREAT SERPL: 12 (ref 9–20)
CALCIUM SERPL-MCNC: 9.9 MG/DL (ref 8.7–10.2)
CHLORIDE SERPL-SCNC: 102 MMOL/L (ref 96–106)
CHOLEST SERPL-MCNC: 209 MG/DL (ref 100–199)
CHOLEST/HDLC SERPL: 3.4 RATIO (ref 0–5)
CO2 SERPL-SCNC: 24 MMOL/L (ref 20–29)
CREAT SERPL-MCNC: 0.92 MG/DL (ref 0.76–1.27)
EGFR: 103 ML/MIN/1.73
EOSINOPHIL # BLD AUTO: 0.1 X10E3/UL (ref 0–0.4)
EOSINOPHIL NFR BLD AUTO: 1 %
ERYTHROCYTE [DISTWIDTH] IN BLOOD BY AUTOMATED COUNT: 15.6 % (ref 11.6–15.4)
EST. AVERAGE GLUCOSE BLD GHB EST-MCNC: 120 MG/DL
GLOBULIN SER-MCNC: 2.8 G/DL (ref 1.5–4.5)
GLUCOSE SERPL-MCNC: 95 MG/DL (ref 70–99)
GLUCOSE SERPL-MCNC: 97 MG/DL (ref 70–99)
HBA1C MFR BLD: 5.8 % (ref 4.8–5.6)
HCT VFR BLD AUTO: 41.2 % (ref 37.5–51)
HDLC SERPL-MCNC: 62 MG/DL
HGB BLD-MCNC: 12.7 G/DL (ref 13–17.7)
IMM GRANULOCYTES # BLD: 0 X10E3/UL (ref 0–0.1)
IMM GRANULOCYTES NFR BLD: 0 %
LDLC SERPL CALC-MCNC: 114 MG/DL (ref 0–99)
LYMPHOCYTES # BLD AUTO: 1.4 X10E3/UL (ref 0.7–3.1)
LYMPHOCYTES NFR BLD AUTO: 32 %
MCH RBC QN AUTO: 24.1 PG (ref 26.6–33)
MCHC RBC AUTO-ENTMCNC: 30.8 G/DL (ref 31.5–35.7)
MCV RBC AUTO: 78 FL (ref 79–97)
MONOCYTES # BLD AUTO: 0.3 X10E3/UL (ref 0.1–0.9)
MONOCYTES NFR BLD AUTO: 8 %
NEUTROPHILS # BLD AUTO: 2.6 X10E3/UL (ref 1.4–7)
NEUTROPHILS NFR BLD AUTO: 58 %
PLATELET # BLD AUTO: 299 X10E3/UL (ref 150–450)
POTASSIUM SERPL-SCNC: 3.9 MMOL/L (ref 3.5–5.2)
PROT SERPL-MCNC: 7.5 G/DL (ref 6–8.5)
RBC # BLD AUTO: 5.28 X10E6/UL (ref 4.14–5.8)
SL AMB VLDL CHOLESTEROL CALC: 33 MG/DL (ref 5–40)
SODIUM SERPL-SCNC: 140 MMOL/L (ref 134–144)
TRIGL SERPL-MCNC: 190 MG/DL (ref 0–149)
WBC # BLD AUTO: 4.5 X10E3/UL (ref 3.4–10.8)

## 2024-11-20 ENCOUNTER — RESULTS FOLLOW-UP (OUTPATIENT)
Dept: ENDOCRINOLOGY | Facility: CLINIC | Age: 49
End: 2024-11-20

## 2024-11-22 ENCOUNTER — TELEPHONE (OUTPATIENT)
Age: 49
End: 2024-11-22

## 2024-11-22 NOTE — TELEPHONE ENCOUNTER
New Patient    What is the reason for the patient’s appointment? NP- hesitancy    What office location does the patient prefer? [place]    Does patient have Imaging/Lab Results:  In Epic     Have patient records been requested?  If No, are the records showing in Epic:   In Epic     INSURANCE:   Do we accept the patient's insurance or is the patient Self-Pay?  Sandy REYES MA      HISTORY:   Has the patient had any previous Urologist(s)? No     Was the patient seen in the ED? No     Has the patient had any outside testing done? No     Does the patient have a personal history of cancer? No

## 2024-11-26 ENCOUNTER — OFFICE VISIT (OUTPATIENT)
Age: 49
End: 2024-11-26

## 2024-11-26 VITALS
RESPIRATION RATE: 19 BRPM | SYSTOLIC BLOOD PRESSURE: 132 MMHG | WEIGHT: 175.9 LBS | BODY MASS INDEX: 26.66 KG/M2 | DIASTOLIC BLOOD PRESSURE: 90 MMHG | HEIGHT: 68 IN | TEMPERATURE: 98 F | HEART RATE: 66 BPM | OXYGEN SATURATION: 98 %

## 2024-11-26 DIAGNOSIS — M25.812 IMPINGEMENT OF LEFT SHOULDER: Primary | ICD-10-CM

## 2024-11-26 PROCEDURE — 99213 OFFICE O/P EST LOW 20 MIN: CPT | Performed by: FAMILY MEDICINE

## 2024-11-26 NOTE — PROGRESS NOTES
Name: José Miguel Lazaro      : 1975      MRN: [id number]  Encounter Provider: Sheila Hastings MD  Encounter Date: 2024   Encounter department: [facility]  :  Assessment & Plan  Impingement of left shoulder  Chronic left shoulder pain without known injury for approximately 1 year.  Physical exam: Active and passive ROM of left shoulder WNL.  No tenderness to palpation of left shoulder joint or at site of biceps tendon insertion. Speeds, Neers, Cano, and Scarf test negative.   Likely mild rotator cuff tendinitis versus impingement  Referred to physical therapy  Provided patient with a home exercise program to strengthen and stretch his shoulder.  Return in 1 month if patient has no improvement  Orders:    Ambulatory Referral to Physical Therapy; Future           History of Present Illness     Patient presents with 1 year of left shoulder pain.  The pain worsens with heavy lifting movements.  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  The pain is worst when he has to lift things from below or overhead.  He denies any pain when laying on the affected shoulder.  He denies any numbness and tingling on the affected arm.  He denies pain at rest or weakness of the left shoulder..  The pain is primarily located in the posterior left shoulder.  Patient denies any neck pain, right shoulder pain, fevers, recent injury, recent fall.      Review of Systems   Constitutional:  Negative for chills and fever.   HENT:  Negative for ear pain and sore throat.    Eyes:  Negative for pain and visual disturbance.   Respiratory:  Negative for cough and shortness of breath.    Cardiovascular:  Negative for chest pain and palpitations.   Gastrointestinal:  Negative for abdominal pain and vomiting.   Genitourinary:  Negative for dysuria and hematuria.   Musculoskeletal:  Positive for arthralgias. Negative for back  "pain.   Skin:  Negative for color change and rash.   Neurological:  Negative for seizures and syncope.   All other systems reviewed and are negative.         Objective   /90 (BP Location: Right arm, Patient Position: Sitting, Cuff Size: Standard)   Pulse 66   Temp 98 °F (36.7 °C)   Resp 19   Ht 5' 7.5\" (1.715 m)   Wt 79.8 kg (175 lb 14.4 oz)   SpO2 98%   BMI 27.14 kg/m²      Physical Exam  Vitals and nursing note reviewed.   Constitutional:       General: He is not in acute distress.     Appearance: He is well-developed.   HENT:      Head: Normocephalic and atraumatic.   Eyes:      Conjunctiva/sclera: Conjunctivae normal.   Cardiovascular:      Rate and Rhythm: Normal rate and regular rhythm.      Heart sounds: No murmur heard.  Pulmonary:      Effort: Pulmonary effort is normal. No respiratory distress.      Breath sounds: Normal breath sounds.   Abdominal:      Palpations: Abdomen is soft.      Tenderness: There is no abdominal tenderness.   Musculoskeletal:         General: No swelling. Normal range of motion.      Cervical back: Neck supple.      Comments: Active and passive ROM of left shoulder WNL.  No tenderness to palpation of left shoulder joint or at site of biceps tendon insertion. Speeds, Neers, Cano, and Scarf test negative.    Skin:     General: Skin is warm and dry.      Capillary Refill: Capillary refill takes less than 2 seconds.   Neurological:      Mental Status: He is alert.   Psychiatric:         Mood and Affect: Mood normal.         "

## 2024-12-05 ENCOUNTER — TELEPHONE (OUTPATIENT)
Age: 49
End: 2024-12-05

## 2024-12-05 DIAGNOSIS — R73.03 PREDIABETES: Primary | ICD-10-CM

## 2024-12-05 NOTE — TELEPHONE ENCOUNTER
I called José Miguel for his referral to diabetes education. He is pre diabetic and has a medicaid. Can you enter a referral for him to see the nutrition department for help with his diet please? Thank you

## 2024-12-09 ENCOUNTER — OFFICE VISIT (OUTPATIENT)
Age: 49
End: 2024-12-09

## 2024-12-09 VITALS
RESPIRATION RATE: 18 BRPM | HEART RATE: 63 BPM | WEIGHT: 176 LBS | DIASTOLIC BLOOD PRESSURE: 80 MMHG | BODY MASS INDEX: 27.62 KG/M2 | OXYGEN SATURATION: 99 % | HEIGHT: 67 IN | SYSTOLIC BLOOD PRESSURE: 147 MMHG

## 2024-12-09 DIAGNOSIS — E78.00 ELEVATED LDL CHOLESTEROL LEVEL: ICD-10-CM

## 2024-12-09 DIAGNOSIS — R71.8 MICROCYTOSIS: ICD-10-CM

## 2024-12-09 DIAGNOSIS — M65.932 EXTENSOR TENOSYNOVITIS OF LEFT WRIST: Primary | ICD-10-CM

## 2024-12-09 PROCEDURE — 99213 OFFICE O/P EST LOW 20 MIN: CPT | Performed by: FAMILY MEDICINE

## 2024-12-09 NOTE — ASSESSMENT & PLAN NOTE
Lab Results   Component Value Date    WBC 4.5 11/19/2024    HGB 12.7 (L) 11/19/2024    HCT 41.2 11/19/2024    MCV 78 (L) 11/19/2024     11/19/2024     Patient is stable with no signs of active bleeding or fatigue. Last iron panel WNL. This seems to be his baseline when compared to previous results over time. Denies any genetic thalassemia history. Last colonoscopy WNL with findings of hemorrhoids and recommended to repeat in 10 years.   -Monitor

## 2024-12-09 NOTE — ASSESSMENT & PLAN NOTE
"Lab Results   Component Value Date    CHOLESTEROL 209 (H) 11/19/2024    CHOLESTEROL 209 (H) 07/21/2023    CHOLESTEROL 222 (H) 01/18/2022     Lab Results   Component Value Date    HDL 62 11/19/2024    HDL 58 07/21/2023    HDL 53 01/18/2022     Lab Results   Component Value Date    TRIG 190 (H) 11/19/2024    TRIG 133 07/21/2023    TRIG 204 (H) 01/18/2022     No results found for: \"NONHDLC\"     Prediabetic.  Counseled importance of low carb diet and exercise instructions attached. Patient will consult local dietitian/nutritionist as well and follow up in 1 month.   "

## 2024-12-09 NOTE — PROGRESS NOTES
"Name: José Miguel Lazaro      : 1975      MRN: [id number]  Encounter Provider: Bill Keyes MD  Encounter Date: 2024   Encounter department: [facility]    Assessment & Plan  Extensor tenosynovitis of left wrist  Chronic, worsening pain and discomfort with overuse.  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  Now also starting to affect right wrist as well.  Negative for Jefferson and Tinel test.  Range of motion and strength intact bilaterally in both wrists.  Not taking any medications.  Would like to consider PT for long term management.  Orders:    Ambulatory Referral to Physical Therapy; Future    Elevated LDL cholesterol level  Lab Results   Component Value Date    CHOLESTEROL 209 (H) 2024    CHOLESTEROL 209 (H) 2023    CHOLESTEROL 222 (H) 2022     Lab Results   Component Value Date    HDL 62 2024    HDL 58 2023    HDL 53 2022     Lab Results   Component Value Date    TRIG 190 (H) 2024    TRIG 133 2023    TRIG 204 (H) 2022     No results found for: \"NONHDLC\"     Prediabetic.  Counseled importance of low carb diet and exercise instructions attached. Patient will consult local dietitian/nutritionist as well and follow up in 1 month.   Microcytosis  Lab Results   Component Value Date    WBC 4.5 2024    HGB 12.7 (L) 2024    HCT 41.2 2024    MCV 78 (L) 2024     2024     Patient is stable with no signs of active bleeding or fatigue. Last iron panel WNL. This seems to be his baseline when compared to previous results over time. Denies any genetic thalassemia history. Last colonoscopy WNL with findings of hemorrhoids and recommended to repeat in 10 years.   -Monitor              History of Present Illness     Patient here for follow up PT referral for worsening tenosynovitis. Has not followed up with PT yet and would like " "new referral. 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 Worsening discomfort at night.        Review of Systems   Constitutional:  Negative for chills, fatigue, fever and unexpected weight change.   Gastrointestinal:  Negative for anal bleeding and blood in stool.   Musculoskeletal:         Worsening tendinitis of the wrists   Neurological:  Negative for tremors and weakness.          Objective   /80 (BP Location: Left arm, Patient Position: Sitting)   Pulse 63   Resp 18   Ht 5' 7\" (1.702 m)   Wt 79.8 kg (176 lb)   SpO2 99%   BMI 27.57 kg/m²      Physical Exam  Constitutional:       Appearance: Normal appearance.   HENT:      Head: Normocephalic and atraumatic.      Right Ear: External ear normal.      Left Ear: External ear normal.      Nose: Nose normal.      Mouth/Throat:      Pharynx: Oropharynx is clear.   Eyes:      Extraocular Movements: Extraocular movements intact.      Conjunctiva/sclera: Conjunctivae normal.   Cardiovascular:      Rate and Rhythm: Normal rate.      Pulses: Normal pulses.   Pulmonary:      Effort: Pulmonary effort is normal.      Breath sounds: Normal breath sounds.   Abdominal:      General: Abdomen is flat. Bowel sounds are normal. There is no distension.      Palpations: Abdomen is soft.      Tenderness: There is no abdominal tenderness.   Musculoskeletal:         General: No swelling. Normal range of motion.      Right wrist: No swelling, deformity, tenderness, bony tenderness or snuff box tenderness. Normal range of motion. Normal pulse.      Left wrist: No swelling, deformity, tenderness, bony tenderness or snuff box tenderness. Normal range of motion. Normal pulse.      Right lower leg: No edema.      Left lower leg: No edema.      Comments: Negative Phalen and Tinnel signs    Skin:     General: Skin is warm and dry.   Neurological:      General: No focal deficit present.      Mental Status: He is alert and oriented to person, place, and " time. Mental status is at baseline.   Psychiatric:         Mood and Affect: Mood normal.         Behavior: Behavior normal.         Thought Content: Thought content normal.         Judgment: Judgment normal.

## 2024-12-19 ENCOUNTER — OFFICE VISIT (OUTPATIENT)
Dept: UROLOGY | Facility: CLINIC | Age: 49
End: 2024-12-19

## 2024-12-19 VITALS
WEIGHT: 175.6 LBS | BODY MASS INDEX: 27.56 KG/M2 | SYSTOLIC BLOOD PRESSURE: 130 MMHG | DIASTOLIC BLOOD PRESSURE: 90 MMHG | OXYGEN SATURATION: 98 % | HEART RATE: 55 BPM | HEIGHT: 67 IN

## 2024-12-19 DIAGNOSIS — N40.1 BENIGN PROSTATIC HYPERPLASIA WITH WEAK URINARY STREAM: ICD-10-CM

## 2024-12-19 DIAGNOSIS — R39.12 BENIGN PROSTATIC HYPERPLASIA WITH WEAK URINARY STREAM: ICD-10-CM

## 2024-12-19 DIAGNOSIS — Z12.5 SCREENING FOR PROSTATE CANCER: ICD-10-CM

## 2024-12-19 DIAGNOSIS — N32.81 OAB (OVERACTIVE BLADDER): ICD-10-CM

## 2024-12-19 DIAGNOSIS — R39.11 HESITANCY: ICD-10-CM

## 2024-12-19 DIAGNOSIS — R39.13 SPLIT OF URINARY STREAM: Primary | ICD-10-CM

## 2024-12-19 LAB — POST-VOID RESIDUAL VOLUME, ML POC: 8 ML

## 2024-12-19 RX ORDER — VITAMIN B COMPLEX
1 CAPSULE ORAL DAILY
COMMUNITY

## 2024-12-19 RX ORDER — TAMSULOSIN HYDROCHLORIDE 0.4 MG/1
0.4 CAPSULE ORAL
Qty: 30 CAPSULE | Refills: 11 | Status: SHIPPED | OUTPATIENT
Start: 2024-12-19

## 2025-01-14 ENCOUNTER — TELEPHONE (OUTPATIENT)
Age: 50
End: 2025-01-14

## 2025-02-28 NOTE — PROGRESS NOTES
Name: José Miguel Lazaro      : 1975      MRN: [id number]  Encounter Provider: Harshad Urbina MD  Encounter Date: 3/3/2025   Encounter department: [facility]  :  Assessment & Plan  Chronic bilateral low back pain without sciatica  Chronic  - Lifts heavy appliances for work  - Reports bilateral lower back/hip pain, denies radicular symptoms  - Would like to try PT again  - Does not really like to take medication    Plan:  - Topical Lidocaine patch, heating pad  - Discussed use of NSAID's to decrease inflammation  - Physical therapy     Orders:  •  Ambulatory Referral to Physical Therapy; Future  •  Lidocaine (HM Lidocaine Patch) 4 % PTCH; Apply 1 patch topically in the morning    Tenosynovitis, de Quervain  Likely overuse due to heavy lifting  - Positive Finkelstein R>L  - Negative carpal tunnel tests   - Patient would like to try Physical therapy for this  - May use topical Voltaren    Orders:  •  Ambulatory Referral to Physical Therapy; Future    Urinary frequency  Reports frequency, hesitancy, weak stream  - Seen by Urology 3 months ago however initially did not appear to recall this visit at all?!  - Per Urology note, ABBIE Grade 1. Was started on Tamsulosin 0.4 mg daily and recommended to return in 3-4 months for Uroflow/PVR and Cystoscopy  - Patient currently not taking Tamsulosin due to concerns of erectile dysfunction. States he would rather deal with urinary symptoms instead. Hesitant about returning for office procedure  - Educated about findings and treatment plan per Urology note. All questions answered as best as possible within scope and understanding from primary care perspective    Plan:  - Discussed Urology recommendations  - Advised to make follow up appt with Urology in the next month for possible cystoscopy as noted in office note  - Patient expressed understanding of plan        Mixed hyperlipidemia  Discussed ASCVD 8%  - Educated about indications for  statin    Plan:  - Declined Statin  - Continue lifestyle modifications             History of Present Illness {?Quick Links Encounters * My Last Note * Last Note in Specialty * Snapshot * Since Last Visit * History :06417}  Would like to discuss chronic back pain (see below). Also has bilateral wrist pain. Reports heavy lifting, carries heavy appliances for work. Also has urinary frequency/hesitancy. Does not appear to remember seeing Urology 3 months ago. Not currently taking Tamsulosin.     Back Pain  This is a chronic problem. The current episode started more than 1 month ago. The problem occurs daily. The problem is unchanged. The pain is present in the lumbar spine. Radiates to: Hip/groin. The pain is severe. The symptoms are aggravated by bending and stress. Pertinent negatives include no bladder incontinence, bowel incontinence, chest pain, dysuria, fever or tingling.     Review of Systems   Constitutional:  Negative for fever.   Respiratory:  Negative for shortness of breath.    Cardiovascular:  Negative for chest pain.   Gastrointestinal:  Negative for bowel incontinence.   Genitourinary:  Positive for difficulty urinating and frequency. Negative for bladder incontinence, dysuria, flank pain and hematuria.   Musculoskeletal:  Positive for back pain.   Neurological:  Negative for tingling.   All other systems reviewed and are negative.      Objective {?Quick Links Trend Vitals * Enter New Vitals * Results Review * Timeline (Adult) * Labs * Imaging * Cardiology * Procedures * Lung Cancer Screening * Surgical eConsent :95814}  /87 (BP Location: Right arm, Patient Position: Sitting, Cuff Size: Standard)   Pulse 61   Temp 98.3 °F (36.8 °C) (Tympanic)   Wt 77.9 kg (171 lb 11.2 oz)   SpO2 99%   BMI 26.89 kg/m²      Physical Exam  Vitals reviewed.   Constitutional:       General: He is not in acute distress.     Appearance: Normal appearance. He is not ill-appearing.   HENT:      Mouth/Throat:       Mouth: Mucous membranes are moist.   Cardiovascular:      Rate and Rhythm: Normal rate.      Pulses: Normal pulses.   Pulmonary:      Effort: No respiratory distress.      Breath sounds: No wheezing.   Genitourinary:     Comments: Deferred  Musculoskeletal:      Right wrist: No swelling. Normal range of motion.      Left wrist: No swelling.      Lumbar back: Spasms and tenderness present. Decreased range of motion.      Comments: UE: +Finklestein test R>L. Normal  strength. Negative Tinnel/Phalen tests   Skin:     General: Skin is warm and dry.   Neurological:      Mental Status: He is alert.   Psychiatric:         Mood and Affect: Mood normal.         Behavior: Behavior normal.

## 2025-03-03 ENCOUNTER — TELEPHONE (OUTPATIENT)
Age: 50
End: 2025-03-03

## 2025-03-03 ENCOUNTER — OFFICE VISIT (OUTPATIENT)
Age: 50
End: 2025-03-03

## 2025-03-03 VITALS
SYSTOLIC BLOOD PRESSURE: 135 MMHG | BODY MASS INDEX: 26.89 KG/M2 | HEART RATE: 61 BPM | TEMPERATURE: 98.3 F | DIASTOLIC BLOOD PRESSURE: 87 MMHG | OXYGEN SATURATION: 99 % | WEIGHT: 171.7 LBS

## 2025-03-03 DIAGNOSIS — E78.2 MIXED HYPERLIPIDEMIA: ICD-10-CM

## 2025-03-03 DIAGNOSIS — M54.50 CHRONIC BILATERAL LOW BACK PAIN WITHOUT SCIATICA: ICD-10-CM

## 2025-03-03 DIAGNOSIS — G89.29 CHRONIC BILATERAL LOW BACK PAIN WITHOUT SCIATICA: Primary | ICD-10-CM

## 2025-03-03 DIAGNOSIS — M65.4 TENOSYNOVITIS, DE QUERVAIN: ICD-10-CM

## 2025-03-03 DIAGNOSIS — G89.29 CHRONIC BILATERAL LOW BACK PAIN WITHOUT SCIATICA: ICD-10-CM

## 2025-03-03 DIAGNOSIS — R35.0 URINARY FREQUENCY: ICD-10-CM

## 2025-03-03 DIAGNOSIS — M54.50 CHRONIC BILATERAL LOW BACK PAIN WITHOUT SCIATICA: Primary | ICD-10-CM

## 2025-03-03 DIAGNOSIS — M65.4 TENOSYNOVITIS, DE QUERVAIN: Primary | ICD-10-CM

## 2025-03-03 PROCEDURE — 99213 OFFICE O/P EST LOW 20 MIN: CPT | Performed by: FAMILY MEDICINE

## 2025-03-03 RX ORDER — LIDOCAINE 4 G/G
1 PATCH TOPICAL DAILY
Qty: 30 PATCH | Refills: 0 | Status: SHIPPED | OUTPATIENT
Start: 2025-03-03 | End: 2025-03-07 | Stop reason: RX

## 2025-03-03 NOTE — ASSESSMENT & PLAN NOTE
Chronic  - Lifts heavy appliances for work  - Reports bilateral lower back/hip pain, denies radicular symptoms  - Would like to try PT again  - Does not really like to take medication    Plan:  - Topical Lidocaine patch, heating pad  - Discussed use of NSAID's to decrease inflammation  - Physical therapy     Orders:  •  Ambulatory Referral to Physical Therapy; Future  •  Lidocaine (HM Lidocaine Patch) 4 % PTCH; Apply 1 patch topically in the morning

## 2025-03-03 NOTE — TELEPHONE ENCOUNTER
[facility] is requesting a PT order that states only his back diagnosis and a separate OT order that would be for his wrist placed in chart. Thank you

## 2025-03-03 NOTE — PATIENT INSTRUCTIONS
Call Urology for follow up in March/April    Try Lidocaine patch and NSAIDs (Naproxen/Advil/Ibuprofen) for back pain

## 2025-03-03 NOTE — ASSESSMENT & PLAN NOTE
Reports frequency, hesitancy, weak stream  - Seen by Urology 3 months ago however initially did not appear to recall this visit at all?!  - Per Urology note, ABBIE Grade 1. Was started on Tamsulosin 0.4 mg daily and recommended to return in 3-4 months for Uroflow/PVR and Cystoscopy  - Patient currently not taking Tamsulosin due to concerns of erectile dysfunction. States he would rather deal with urinary symptoms instead. Hesitant about returning for office procedure  - Educated about findings and treatment plan per Urology note. All questions answered as best as possible within scope and understanding from primary care perspective    Plan:  - Discussed Urology recommendations  - Advised to make follow up appt with Urology in the next month for possible cystoscopy as noted in office note  - Patient expressed understanding of plan

## 2025-03-03 NOTE — ASSESSMENT & PLAN NOTE
Discussed ASCVD 8%  - Educated about indications for statin    Plan:  - Declined Statin  - Continue lifestyle modifications

## 2025-03-04 NOTE — TELEPHONE ENCOUNTER
Prescription change request from [facility]  Lidocaine 4% patch    Note to prescriber, alternative preferred

## 2025-03-05 ENCOUNTER — APPOINTMENT (OUTPATIENT)
Dept: OCCUPATIONAL THERAPY | Facility: CLINIC | Age: 50
End: 2025-03-05
Payer: COMMERCIAL

## 2025-03-06 ENCOUNTER — APPOINTMENT (OUTPATIENT)
Dept: PHYSICAL THERAPY | Facility: CLINIC | Age: 50
End: 2025-03-06
Payer: COMMERCIAL

## 2025-03-07 RX ORDER — TROLAMINE SALICYLATE 10 G/100G
CREAM TOPICAL AS NEEDED
Qty: 57 G | Refills: 0 | Status: SHIPPED | OUTPATIENT
Start: 2025-03-07

## 2025-03-13 ENCOUNTER — EVALUATION (OUTPATIENT)
Dept: OCCUPATIONAL THERAPY | Facility: CLINIC | Age: 50
End: 2025-03-13
Payer: COMMERCIAL

## 2025-03-13 DIAGNOSIS — M65.4 TENOSYNOVITIS, DE QUERVAIN: Primary | ICD-10-CM

## 2025-03-13 PROCEDURE — 97165 OT EVAL LOW COMPLEX 30 MIN: CPT

## 2025-03-13 PROCEDURE — 97110 THERAPEUTIC EXERCISES: CPT

## 2025-03-13 NOTE — PROGRESS NOTES
OT Evaluation     Today's date: 3/13/2025  Patient name: José Miguel Lazaro  : 1975  MRN: [id number]  Referring provider: Harshad Urbina MD  Dx:   Encounter Diagnosis     ICD-10-CM    1. Tenosynovitis, de Quervain  M65.4 Ambulatory Referral to Occupational Therapy                     Assessment  Impairments: abnormal or restricted ROM, impaired physical strength, lacks appropriate home exercise program and pain with function  Understanding of Dx/Px/POC: good     Prognosis: good    Goals  Short term goals 2-4 weeks  Establish HEP to enhance performance with ADLs.    In crease  strength by 10 lbs. to enhance gripping hand tools.     Reduce pain by 2-4 on VAS scale with use and activity.       Long Term goals by discharge  Establish final home exercise program to enhance maximal functional level with ADLs.    Increase strength to contralateral side by time of d/c.          Plan  Patient would benefit from: OT eval, skilled occupational therapy, orthotics and custom splinting  Planned modality interventions: thermotherapy: hydrocollator packs, cryotherapy, TENS, unattended electrical stimulation, ultrasound and electrical stimulation/Russian stimulation    Planned therapy interventions: manual therapy, joint mobilization, strengthening, stretching, therapeutic activities, therapeutic exercise, home exercise program, graded exercise, graded activity, functional ROM exercises, flexibility, orthotic fitting/training, IASTM and kinesiology taping    Frequency: 1-2x week  Duration in weeks: 10  Plan of Care beginning date: 3/13/2025  Plan of Care expiration date: 2025  Treatment plan discussed with: patient        Subjective Evaluation    History of Present Illness  Mechanism of injury: Patient is a 49 y.o. RHD male who presents for OT IE and treatment for b/l wrist pain. Patient reports pain is not always constant and has been on and off for years. Patient notes no numbness or tingling in the hands. Patient  notes pain is worse after a day of working with the hands. Patient referred by Dr. Urbina to initiate treatment including hand therapy.    Pain  Current pain ratin  At best pain ratin  At worst pain rating: 10  Quality: dull ache and throbbing          Objective     Active Range of Motion     Left Wrist   Wrist flexion: 85 degrees   Wrist extension: 80 degrees   Radial deviation: 15 degrees   Ulnar deviation: 35 degrees     Right Wrist   Wrist flexion: 75 degrees   Wrist extension: 80 degrees   Radial deviation: 19 degrees   Ulnar deviation: 35 degrees     Strength/Myotome Testing     Left Wrist/Hand   Wrist extension: 4+  Wrist flexion: 4+     (2nd hand position)     Trial 1: 95    Right Wrist/Hand   Wrist extension: 4  Wrist flexion: 4     (2nd hand position)     Trial 1: 80               Assessment:   Patient tolerated session well. Patient demonstrates strength and pain with function deficits upon assessment today. Patient session focused on patient education on anatomy and physiology concerning current dx, techniques for decreasing deficits through HEP, and appropriate use of modalities. Patient educated on HEP to include bracing and stretching with verbal instructions and handouts for patient reference. Patient educated on treatment plan at this time. Patient benefiting from skilled hand therapy OT to reduce deficits to improve independence with daily activities      Plan:   Focus on ROM to improve ability to complete daily activites with ease.  POC 3/13/25 - 25      Auth Tracker  Auth Status Total   Visits  Auth start date Expiration date Co-Insurance                                          Visit Tracker  Date 3/13                                                                                    PMHx:   has a past medical history of Anemia and GERD (gastroesophageal reflux disease).    Precautions:   For all exercises and treatment interventions listed below; patient educated to complete  within tolerance and pain threshold. Patient educated if symptoms increase or pain becomes intolerable they should discontinue and/or reduce intensity/frequency.     Manuals HEP 3/13/2025                       Ther Ex     Education on HEP and dx  x5min                  Ktape widget  X5min    FDC stretch  5x5sec   PROM wrist flex/ext x 5x 5 sec             Ther Act                     Modalities

## 2025-03-17 ENCOUNTER — EVALUATION (OUTPATIENT)
Dept: PHYSICAL THERAPY | Facility: CLINIC | Age: 50
End: 2025-03-17
Payer: COMMERCIAL

## 2025-03-17 DIAGNOSIS — G89.29 CHRONIC BILATERAL LOW BACK PAIN WITHOUT SCIATICA: Primary | ICD-10-CM

## 2025-03-17 DIAGNOSIS — M25.552 LEFT HIP PAIN: ICD-10-CM

## 2025-03-17 DIAGNOSIS — M54.50 CHRONIC BILATERAL LOW BACK PAIN WITHOUT SCIATICA: Primary | ICD-10-CM

## 2025-03-17 PROCEDURE — 97530 THERAPEUTIC ACTIVITIES: CPT

## 2025-03-17 PROCEDURE — 97161 PT EVAL LOW COMPLEX 20 MIN: CPT

## 2025-03-17 NOTE — PROGRESS NOTES
PT Evaluation   Today's date: 3/17/2025  Patient name: José Miguel Lazaro  : 1975  MRN: [id number]  Referring provider: Aldair Boyce DO  Dx:   Encounter Diagnosis     ICD-10-CM    1. Chronic bilateral low back pain without sciatica  M54.50     G89.29       2. Left hip pain  M25.552               Assessment  Assessment details: Patient is a 49 y.o. Male who presents with referring diagnosis of chronic bilateral low back pain without sciatica. Patient's greatest concern is fear of not being able to keep active and future ill health (and wanting to prevent it).    Primary movement impairment diagnosis of chronic low back pain with movement coordination deficits resulting in pathoanatomical symptoms of restricted range of motion, muscular power deficits, mobility deficits, gait dysfunction, and functional limitations. The aforementioned impairments have limited the patient's ability to bend/lift/stoop without pain, perform work activities without restriction. No further referral appears necessary at this time based upon examination results.    Patient education performed during today's session included: HEP consisting of bridges w/ ad squeeze and glute sets    Primary movement impairments:  1) Movement coordination deficits/motor control  2) Mobility deficits    Etiological factors include: none recalled by patient        Impairments: Abnormal coordination, Abnormal gait, Abnormal muscle tone, Abnormal or restricted ROM, Activity intolerance, Impaired balance, Impaired physical strength, Lacks appropriate HEP, Poor posture, Poor body mechanics, Pain with function, Abnormal movement, and Abnormal muscle firing  Understanding of Dx/Px/POC: Good  Prognosis: Good   Positive prognostic factors: age   Negative prognostic factors: chronicity of pain/recurrent problem, comorbid conditions    Patient verbalized understanding of POC.         Please  contact me if you have any questions or recommendations. Thank you for the referral and the opportunity to share in José Miguel Lazaro's care.        Plan  Patient would benefit from: PT Eval and Skilled PT  Planned modality interventions: Biofeedback, Cryotherapy, TENS, Thermotherapy: Hydrocollator Packs, and Traction  Planned therapy interventions: Abdominal trunk stabilization, ADL training, Balance/WB training, Body mechanics training, Coordination, Dry Needling, Functional ROM exercises, Gait training, HEP, Joint mobilization, Manual therapy, Hewitt taping, Motor coordination training, Neuromuscular re-education, Patient education, Postural training, Strengthening, Stretching, Therapeutic activities, Therapeutic exercises, and Activity modification  Frequency: 1-2x/wk  Duration in weeks: 8  Plan of Care beginning date: 3/17/2025  Plan of Care expiration date: 8 weeks - 5/12/2025  Treatment plan discussed with: Patient       Goals  Short Term Goals (4 weeks):    - Patient will be independent in basic HEP 2-3 weeks  - Patient will report >50% reduction in pain  - Patient will demonstrate >1/3 improvement in MMT grade as applicable  - Patient will demo pain free lumbar AROM    Long Term Goals (8 weeks):  - Patient will be independent in a comprehensive home exercise program  - Patient FOTO score will improve >10 points  - Patient will self-report >75% improvement in function  - Patient will deny pain with bending/stooping  - Patient will carry tool bag without pain      Subjective    History of Present Illness  - Mechanism of injury: Patient reports to physical therapy with low back, left shoulder, and left hip pain. States this is a recurrent problem. States he has had PT for this previously, which helps. States left hip pain extends to glute, worsening over the last year. Aggravating factors for left shoulder pain include reaching overhead and lifting.    Greatest aggravating factors for low back pain include  getting going in the morning, lifting his tool bag, bending/stooping/lifting from the floor. States he usually carries his tool bag on his left side, has been trying to use R UE more often. States sitting down and resting improve back pain. States he usually sleeps on his stomach.    Denies pain with driving, but gets pain once he gets out of the car.    Denies numbness/tingling into LE. Denies pain with stairs.    Patient is concurrently being seen by OT for bilateral hand pain, R > L.    Work history: appliance repair (frequently doing stairs, getting onto/off floor)      Pain  - Current pain ratin/10  - At best pain ratin/10  - At worst pain rating: 10/10      Objective      Postural Assessment  - Posture in Sitting: rounded posture  - Posture in Standing: left lateral shift    Sensation  - Light touch: grossly intact and equal bilaterally    LE MMT  LEFT  RIGHT  -Hip Flexion:     -Hip Abduction:   -Hip Adduction:   -Hip IR:    -Hip ER:      -Knee Flexion    -Knee Extension     -Ankle DF    -Ankle PF      Lumbar Spine Range of Motion  Flexion:  Moderately limited  with pain  Extension:  Minimally limited  without pain  Lateral Flexion - Left:  Moderately limited  with pain  Lateral Flexion - Right:  Moderately limited  with pain  Rotation - Left:  Moderately limited  without pain  Rotation - Right:  Moderately limited  with pain    Hip Range of Motion    LEFT  RIGHT  - Flexion 115 *p  115  - IR  45 *p  45  - ER  60  60    Mechanical Assessment  Comparable Sign: lumbar flexion AROM  - KALEB: increased pain, decreased motion  - Lumbar flexion performed w/ ad sq: improved motion and pain    Joint Play  T10: Hypomobile  T11: Hypomobile  T12: Hypomobile  L1: Hypomobile  L2: Hypomobile  L3: Hypomobile and Pain  L4: Hypomobile and Pain  L5: Hypomobile and Pain    Diagnostic Tests Performed  Positive: Prone Instability and Slump (on left)    Negative: SI Compression and SI Distraction, Thigh Thrust             Insurance Eval/ Re-eval POC expires Auth Status Total visits  Start date  Expiration date Misc   [facility] 3/17/25 5/12/25 SUBMITTED                     Date 3/17/25        Visit Number 1        Auth 1 / TBD                 Manual                                                      TherEx         Back extension                                                                        Neuro Re-Ed         Sciatic nerve glides         TA progressions TA during lifting         Bridge progressions Bridge w/ ad sq for HEP    Glute sets for HEP        Clamshells         Suitcase carries         RDLs         Palloff press         Resisted trunk rotation                           TherAct          Pt edu, PT POC, HEP x10 min                                            Gait Training                                    Modalities         CP             Precautions:   Past Medical History:   Diagnosis Date    Anemia     GERD (gastroesophageal reflux disease)

## 2025-03-24 ENCOUNTER — OFFICE VISIT (OUTPATIENT)
Dept: PHYSICAL THERAPY | Facility: CLINIC | Age: 50
End: 2025-03-24
Payer: COMMERCIAL

## 2025-03-24 DIAGNOSIS — M25.552 LEFT HIP PAIN: ICD-10-CM

## 2025-03-24 DIAGNOSIS — M54.50 CHRONIC BILATERAL LOW BACK PAIN WITHOUT SCIATICA: Primary | ICD-10-CM

## 2025-03-24 DIAGNOSIS — G89.29 CHRONIC BILATERAL LOW BACK PAIN WITHOUT SCIATICA: Primary | ICD-10-CM

## 2025-03-24 PROCEDURE — 97112 NEUROMUSCULAR REEDUCATION: CPT

## 2025-03-24 NOTE — PROGRESS NOTES
"Daily Note     Today's date: 3/24/2025  Patient name: José Miguel Lazaro  : 1975  MRN: [id number]  Referring provider: Aldair Boyce DO  Dx:   Encounter Diagnosis     ICD-10-CM    1. Chronic bilateral low back pain without sciatica  M54.50     G89.29       2. Left hip pain  M25.552           Start Time: 0800  Stop Time: 0840  Total time in clinic (min): 40 minutes    Subjective: Patient denies changes in back pain since previous session. States he cont to experience pain with bending and lifting.      Objective: See treatment diary below      Assessment: Tolerated treatment well. No change in sx noted w/ trial of REIL. Inc emphasis placed on functional stab progressions during today's session. Patient req heavy tactile and verbal cues w/ initiation of RDL progressions to promote quality of movement pattern and maintenance of upright posture. Pt w/ improvement in lumbar flexion ROM and reduction in pain post session. Pt will continue to benefit from skilled PT to improve functional mobility and activity tolerance.      Plan: Continue per plan of care.        Insurance Eval/ Re-eval POC expires Auth Status Total visits  Start date  Expiration date Misc   [facility] 3/17/25 5/12/25 APPROVED 12 3/17 6/17                  Date 3/17/25 3/24/25       Visit Number 1 2       Auth                 Manual                                                      TherEx  Trial REIL - inc       Back extension                                                                        Neuro Re-Ed         Sciatic nerve glides  Seated knee ext w/ DF 2x10 bilat       TA progressions TA during lifting         Bridge progressions Bridge w/ ad sq for HEP    Glute sets for HEP Bridge w/ ad sq 5\" 3x15       Clamshells         Suitcase carries  18# 70 feet x3 laps each UE       RDLs  With dowel, against wall, standing    Trial 18# KB to chair    Heavy edu t/o       Split stance + row  GTB 3x10       Palloff press  GTB 3x10 bilat     "   Resisted trunk rotation  GTB 3x10 bilat       Chops  Mid ranges, 9# 3x8 bilat       Plank progressions  With hip ext 2x10 bilat                TherAct          Pt edu, PT POC, HEP x10 min                                            Gait Training                                    Modalities         CP             Precautions:   Past Medical History:   Diagnosis Date    Anemia     GERD (gastroesophageal reflux disease)

## 2025-03-26 ENCOUNTER — OFFICE VISIT (OUTPATIENT)
Age: 50
End: 2025-03-26

## 2025-03-26 VITALS
OXYGEN SATURATION: 97 % | RESPIRATION RATE: 18 BRPM | SYSTOLIC BLOOD PRESSURE: 144 MMHG | TEMPERATURE: 97.4 F | WEIGHT: 165 LBS | DIASTOLIC BLOOD PRESSURE: 84 MMHG | BODY MASS INDEX: 25.84 KG/M2 | HEART RATE: 66 BPM

## 2025-03-26 DIAGNOSIS — M79.18 MUSCULOSKELETAL PAIN: ICD-10-CM

## 2025-03-26 DIAGNOSIS — M25.512 ACUTE PAIN OF LEFT SHOULDER: Primary | ICD-10-CM

## 2025-03-26 PROCEDURE — 99213 OFFICE O/P EST LOW 20 MIN: CPT | Performed by: FAMILY MEDICINE

## 2025-03-26 NOTE — PROGRESS NOTES
Name: José Miguel Lazaro      : 1975      MRN: [id number]  Encounter Provider: Ailyn Callahan DO  Encounter Date: 3/26/2025   Encounter department: [facility]  :  Assessment & Plan  Acute pain of left shoulder  Left shoulder pain, gradual onset, lifts heavy items (refrigerators, stoves, washing machines) for work, works as appliance maintenance for [employer]   Has started PT for back pain, would like to try PT for shoulder as well   On exam - positive Hawkin's impingement of left shoulder   Normal ROM, however active motion with pain   Mild weakness of left shoulder     Physical therapy referral ordered   Also recommend OMT on Tuesday mornings at [facility] office   Conservative pain management with NSAIDs PRN, ice/heat (never apply directly to skin)   Orders:    Ambulatory Referral to Physical Therapy; Future    Musculoskeletal pain  Pain in band like pattern on lower abdominal region where patient wears his belt   Pain not related eating habits, denies constipation, blood in stool, nausea/vomiting   Pain reproducible on palpation of region, per patient feels more like discomfort     Pain likely due to continuous pressure of wide leather belt   Recommended patient get thinner belt or belt made of different material   Also recommended patient get measured by tailor in order to get better fitting pants, possibly eliminating need for belt   Try to avoid excess weight from waist - I.e. do not wear a work/utility belt while working               History of Present Illness   Left shoulder pain, gradual onset, lifts heavy items (refrigerators, stoves, washing machines) for work, works as appliance maintenance for [employer]   Also has abdominal pain, band like pattern across lower abdomen   Pain in belt region - likely due to belt being too tight       Review of Systems   Constitutional:  Negative for chills and fever.   HENT:  Negative for ear pain and sore throat.     Eyes:  Negative for pain and visual disturbance.   Respiratory:  Negative for cough and shortness of breath.    Cardiovascular:  Negative for chest pain and palpitations.   Gastrointestinal:  Positive for abdominal pain. Negative for vomiting.   Genitourinary:  Negative for dysuria and hematuria.   Musculoskeletal:  Positive for arthralgias (left shoulder), back pain and myalgias (abdominal region).   Skin:  Negative for color change and rash.   Neurological:  Negative for dizziness and headaches.   All other systems reviewed and are negative.      Objective   /84 (BP Location: Left arm, Patient Position: Sitting, Cuff Size: Standard)   Pulse 66   Temp (!) 97.4 °F (36.3 °C) (Tympanic)   Resp 18   Wt 74.8 kg (165 lb)   SpO2 97%   BMI 25.84 kg/m²      Physical Exam  Vitals and nursing note reviewed.   Constitutional:       General: He is not in acute distress.     Appearance: He is normal weight.   HENT:      Head: Normocephalic and atraumatic.      Mouth/Throat:      Mouth: Mucous membranes are moist.   Eyes:      Extraocular Movements: Extraocular movements intact.      Conjunctiva/sclera: Conjunctivae normal.   Cardiovascular:      Rate and Rhythm: Normal rate and regular rhythm.      Heart sounds: Normal heart sounds. No murmur heard.  Pulmonary:      Effort: Pulmonary effort is normal.      Breath sounds: Normal breath sounds.   Abdominal:      General: Abdomen is flat.      Palpations: Abdomen is soft. There is no mass.      Tenderness: There is abdominal tenderness (discomfort to palpation of lower abdominal region). There is no guarding.      Hernia: No hernia is present.   Musculoskeletal:      Right shoulder: Normal.      Right lower leg: No edema.      Left lower leg: No edema.      Comments: Normal active/passive ROM on left shoulder, put had pain with movement   Weakness of left shoulder   Positive Hawkin's left    Skin:     General: Skin is warm.   Neurological:      General: No focal  deficit present.      Mental Status: He is alert and oriented to person, place, and time.      Sensory: No sensory deficit.      Motor: Weakness (mild weakness of left shoulder) present.      Gait: Gait normal.   Psychiatric:         Mood and Affect: Mood normal.         Behavior: Behavior normal.

## 2025-03-27 ENCOUNTER — OFFICE VISIT (OUTPATIENT)
Dept: OCCUPATIONAL THERAPY | Facility: CLINIC | Age: 50
End: 2025-03-27
Payer: COMMERCIAL

## 2025-03-27 DIAGNOSIS — M65.4 TENOSYNOVITIS, DE QUERVAIN: Primary | ICD-10-CM

## 2025-03-27 PROCEDURE — 97032 APPL MODALITY 1+ESTIM EA 15: CPT

## 2025-03-27 PROCEDURE — 97110 THERAPEUTIC EXERCISES: CPT

## 2025-03-27 NOTE — PROGRESS NOTES
Daily Note     Today's date: 3/27/2025  Patient name: José Miguel Lazaro  : 1975  MRN: [id number]  Referring provider: Harshad Urbina MD  Dx:   Encounter Diagnosis     ICD-10-CM    1. Tenosynovitis, de Quervain  M65.4           Start Time: 0750  Stop Time: 0835  Total time in clinic (min): 45 minutes    Subjective: Patient reports decrease in symptoms with kinesio tape (wrist widget) of the RUE from previous session. Patient notes increased pain/discomfort to the L hand when taking a shower this morning.       Objective: See treatment diary below      Assessment:   Patient tolerated session well. Session focused on HEP education, range of motion, patient education, strengthening, kinesio tape, ESTM modality for pain reduction and appropriate bracing to improve functional task performance with daily activities. Patient tolerated all TE and modalities with good tolerance and no complaints. Patient progressing well towards goals. Patient benefiting from skilled hand therapy OT to reduce deficits to improve independence with daily activities.        Plan:   Focus on ROM, stretching, functional strength, kinesio tape, TE, TA, NMRE, UE strengthening and all modalities as seen fit to improve ability to complete daily activites with ease.    POC 3/13/25 - 25         Auth Tracker  Auth Status Total   Visits  Auth start date Expiration date Co-Insurance   Approved 12 3/13/25 6/13/25                                    Visit Tracker  Date 3/13  IE 3/27           RE  X                                                                      PMHx:   has a past medical history of Anemia and GERD (gastroesophageal reflux disease).    Precautions:   For all exercises and treatment interventions listed below; patient educated to complete within tolerance and pain threshold. Patient educated if symptoms increase or pain becomes intolerable they should discontinue and/or reduce intensity/frequency.     Manuals HEP 3/27/2025                        Ther Ex     Education on HEP and dx  x5min                  Ktape widget  X5min  b/l hands   FDC stretch  5x5sec   PROM wrist flex/ext x 5x 5 sec   Wrist PREs  4lbs x10 reps b/l   Flex bar pro/sup/flex/ext  Green [id number] b/l   Pronator with dumbbell  2lbs [id number] b/l        Ther Act                     Modalities     MHP  x5min b/l hands   ESTM  X10min R hand    Patient received PreMod Electric Stimulation to radial side/base of thumb of the R hand at sensory level (80-150HZ) to decrease pain to allow for increased ability to perform ADLs.  2in x 2in electrode padswere used at an average intensity of 9.2 - 9.4. Skin was assessed pre and post tx with no adverse effects noted. Therapist attended to patient throughout the entire duration of ESTM treatment to adjust intensity and checking up on patient.

## 2025-04-03 ENCOUNTER — OFFICE VISIT (OUTPATIENT)
Dept: OCCUPATIONAL THERAPY | Facility: CLINIC | Age: 50
End: 2025-04-03
Payer: COMMERCIAL

## 2025-04-03 DIAGNOSIS — M65.4 TENOSYNOVITIS, DE QUERVAIN: Primary | ICD-10-CM

## 2025-04-03 PROCEDURE — 97032 APPL MODALITY 1+ESTIM EA 15: CPT

## 2025-04-03 PROCEDURE — 97110 THERAPEUTIC EXERCISES: CPT

## 2025-04-03 NOTE — PROGRESS NOTES
Daily Note     Today's date: 4/3/2025  Patient name: José Miguel Lazaro  : 1975  MRN: [id number]  Referring provider: Harshad Urbina MD  Dx:   Encounter Diagnosis     ICD-10-CM    1. Tenosynovitis, de Quervain  M65.4           Start Time: 745  Stop Time: 08  Total time in clinic (min): 45 minutes    Subjective: Patient reports decrease in symptoms with kinesio tape (wrist widget) of the BUE from previous session. Patient notes increased pain/discomfort to the L hand when taking a shower this morning.       Objective: See treatment diary below      Assessment:   Patient tolerated session well. Session focused on HEP education, range of motion, patient education, strengthening, kinesio tape, ESTM modality for pain reduction and appropriate bracing to improve functional task performance with daily activities. Patient tolerated all TE and modalities with good tolerance and no complaints. Patient progressing well towards goals. Patient benefiting from skilled hand therapy OT to reduce deficits to improve independence with daily activities.        Plan:   Focus on ROM, stretching, functional strength, kinesio tape, TE, TA, NMRE, UE strengthening and all modalities as seen fit to improve ability to complete daily activites with ease.    POC 3/13/25 - 25         Auth Tracker  Auth Status Total   Visits  Auth start date Expiration date Co-Insurance   Approved 12 3/13/25 6/13/25                                    Visit Tracker  Date 3/13  IE 3/27 4/3          RE  X                                                                      PMHx:   has a past medical history of Anemia and GERD (gastroesophageal reflux disease).    Precautions:   For all exercises and treatment interventions listed below; patient educated to complete within tolerance and pain threshold. Patient educated if symptoms increase or pain becomes intolerable they should discontinue and/or reduce intensity/frequency.     Manuals HEP 4/3/2025                        Ther Ex     Education on HEP and dx  x5min                  Ktape widget  X5min  b/l hands   FDC stretch  5x5sec   PROM wrist flex/ext x 5x 5 sec   Wrist PREs  4lbs x10 reps b/l 2 sets   Flex bar pro/sup/flex/ext  Green [id number] b/l 2 sets   Pronator with dumbbell  2lbs [id number] b/l 2 sets        Ther Act                     Modalities     MHP  x5min b/l hands   ESTM  X10min b/l hand    Patient received PreMod Electric Stimulation to radial side/base of thumb of the bilateral hands at sensory level (80-150HZ) to decrease pain to allow for increased ability to perform ADLs.  2in x 2in electrode padswere used at an average intensity of 11.0 Cv votls. Skin was assessed pre and post tx with no adverse effects noted. Therapist attended to patient throughout the entire duration of ESTM treatment to adjust intensity and checking up on patient.

## 2025-04-04 ENCOUNTER — OFFICE VISIT (OUTPATIENT)
Dept: PHYSICAL THERAPY | Facility: CLINIC | Age: 50
End: 2025-04-04
Payer: COMMERCIAL

## 2025-04-04 DIAGNOSIS — G89.29 CHRONIC BILATERAL LOW BACK PAIN WITHOUT SCIATICA: ICD-10-CM

## 2025-04-04 DIAGNOSIS — M25.512 ACUTE PAIN OF LEFT SHOULDER: Primary | ICD-10-CM

## 2025-04-04 DIAGNOSIS — M54.50 CHRONIC BILATERAL LOW BACK PAIN WITHOUT SCIATICA: ICD-10-CM

## 2025-04-04 DIAGNOSIS — M25.552 LEFT HIP PAIN: ICD-10-CM

## 2025-04-04 PROCEDURE — 97530 THERAPEUTIC ACTIVITIES: CPT

## 2025-04-04 NOTE — PROGRESS NOTES
Re-Evaluation     Today's date: 2025  Patient name: José Miguel Lazaro  : 1975  MRN: [id number]  Referring provider: Aldair Boyce DO  Dx:   Encounter Diagnosis     ICD-10-CM    1. Acute pain of left shoulder  M25.512       2. Chronic bilateral low back pain without sciatica  M54.50     G89.29       3. Left hip pain  M25.552           Start Time: 0800  Stop Time: 0840  Total time in clinic (min): 40 minutes    Subjective: Patient states he felt good following previous session. States pain is improving overall, although he does continue experience pain with bending forward. States pain is improving with carrying his work bag.    Patient states his primary concern has been shifting to his left shoulder pain, for which he would like to be evaluated for today. Reports lifting his work back out of his truck is greatest aggravating factor. Occasional pain with pushing activities. Reports mild discomfort/tightness with reaching behind his head. Denies pain with reaching overhead.    Patient is right hand dominant.   Denies numbness/tingling.  Denies neck pain.    Goals updated at [facility] on 25    Short Term Goals (4 weeks):  - all progressed at [facility] on 25  - Patient will be independent in basic HEP 2-3 weeks  - Patient will report >50% reduction in pain  - Patient will demonstrate >1/3 improvement in MMT grade as applicable  - Patient will demo pain free lumbar AROM    Long Term Goals (8 weeks): - all progressed at [facility] on 25  - Patient will be independent in a comprehensive home exercise program  - Patient FOTO score will improve >10 points  - Patient will self-report >75% improvement in function  - Patient will deny pain with bending/stooping  - Patient will carry tool bag without pain    Additional goals added for left shoulder, set for 6 weeks:  - Patient will demo full, pain free shoulder elevation  - Patient will lift work bag from truck without shoulder pain  - MMT equal  bilaterally    Objective: See treatment diary below    Shoulder AROM  - Flexion: full, pain at EOR   - Abduction: full, apprehension noted  - ER: equal bilaterally  - IR: equal bilaterally    Shoulder PROM  - Mild end range restriction in all planes    UE MMT (L - R)  - Shoulder Flexion: 4/5 4+/5  - Shoulder Abduction: 4/5 *p 4+/5  - Shoulder ER: 4/5  4+/5  - Shoulder IR: 4+/5   4+/5  - Elbow Flexion: 4+/5  4+/5  - Elbow Extension: 4+/5  4+/5  - Middle Trap: 4-/5  4/5    Cervical AROM  - Minimal restriction, pain free in all planes    Thoracic AROM  - Moderate restriction, pain free in all planes    Mobility Assessment  - Hypomobility noted mid-thoracic spine  - Hypomobility noted left ACJ (inferior > AP)  - Mild deficits w/ inferior GHJ on left    Scapular Positioning  - Static: abducted positioning on left    Diagnostic Testing  - Positive: montes-neo  - Negative: neer, empty can, drop arm    Mechanical Exam  Comparable Sign - lifting 28# KB from elevated surface (simulating removing work bag from truck)  - Repeated thoracic extension: improved pain significantly           Assessment: In regards to left shoulder, patient presents with signs and symptoms consistent with mobility deficits and impingement. Patient with significant improvement in shoulder pain following repeated thoracic extension, indicative of regional interdependence and mobility deficits. HEP updated for t/s extension, encouraged to perform 3x/day, pt verbalized good understanding/agreement. PT POC and goals were updated/reviewed during today's session and pt expressed no questions/concerns with updates made.       Plan: Continue per plan of care.        Insurance Eval/ Re-eval POC expires Auth Status Total visits  Start date  Expiration date Misc   [facility] 3/17/25 5/12/25 APPROVED 12 3/17 6/17                  Date 3/17/25 3/24/25 4/4/25      Visit Number 1 2 3      Auth 1 / 12 2 / 12 3 / 12               Manual                                   "                    TherEx  Trial REIL - inc       Back extension                                                                        Neuro Re-Ed         Sciatic nerve glides  Seated knee ext w/ DF 2x10 bilat       TA progressions TA during lifting         Bridge progressions Bridge w/ ad sq for HEP    Glute sets for HEP Bridge w/ ad sq 5\" 3x15       Clamshells         Suitcase carries  18# 70 feet x3 laps each UE       RDLs  With dowel, against wall, standing    Trial 18# KB to chair    Heavy edu t/o       Split stance + row  GTB 3x10       Palloff press  GTB 3x10 bilat       Resisted trunk rotation  GTB 3x10 bilat       Chops  Mid ranges, 9# 3x8 bilat       Plank progressions  With hip ext 2x10 bilat                TherAct          Pt edu, PT POC, HEP x10 min  Assess left shoulder, pt edu, PT POC, HEP x40 min                                          Gait Training                                    Modalities         CP             Precautions:   Past Medical History:   Diagnosis Date    Anemia     GERD (gastroesophageal reflux disease)                   "

## 2025-04-10 ENCOUNTER — OFFICE VISIT (OUTPATIENT)
Dept: OCCUPATIONAL THERAPY | Facility: CLINIC | Age: 50
End: 2025-04-10
Payer: COMMERCIAL

## 2025-04-10 DIAGNOSIS — M65.4 TENOSYNOVITIS, DE QUERVAIN: Primary | ICD-10-CM

## 2025-04-10 PROCEDURE — 97032 APPL MODALITY 1+ESTIM EA 15: CPT

## 2025-04-10 PROCEDURE — 97110 THERAPEUTIC EXERCISES: CPT

## 2025-04-10 NOTE — PROGRESS NOTES
"Daily Note     Today's date: 4/10/2025  Patient name: José Miguel Lazaro  : 1975  MRN: [id number]  Referring provider: Harshad Urbina MD  Dx:   Encounter Diagnosis     ICD-10-CM    1. Tenosynovitis, de Quervain  M65.4           Start Time: 745  Stop Time: 0830  Total time in clinic (min): 45 minutes    Subjective: Patient reports \"Its working, I haven't felt that twitch since the previous OT session\". Patient states prior to therapy he would experience pain when twisting and ringing out a wet towel.       Objective: See treatment diary below      Assessment:   Patient tolerated session well. Session focused on HEP education, range of motion, patient education, strengthening, kinesio tape, ESTM modality for pain reduction and appropriate bracing to improve functional task performance with daily activities. Patient tolerated all TE and modalities with good tolerance and no complaints. Patient progressing well towards goals. Patient benefiting from skilled hand therapy OT to reduce deficits to improve independence with daily activities.        Plan:   Focus on ROM, stretching, functional strength, kinesio tape, TE, TA, NMRE, UE strengthening and all modalities as seen fit to improve ability to complete daily activites with ease.    POC 3/13/25 - 25         Auth Tracker  Auth Status Total   Visits  Auth start date Expiration date Co-Insurance   Approved 12 3/13/25 6/13/25                                    Visit Tracker  Date 3/13  IE 3/27 4/3 4/10         RE  X                                                                      PMHx:   has a past medical history of Anemia and GERD (gastroesophageal reflux disease).    Precautions:   For all exercises and treatment interventions listed below; patient educated to complete within tolerance and pain threshold. Patient educated if symptoms increase or pain becomes intolerable they should discontinue and/or reduce intensity/frequency.     Manuals HEP 4/10/2025     "                   Ther Ex     Education on HEP and dx  x5min                  Ktape widget  X5min  b/l hands   FDC stretch  5x5sec   PROM wrist flex/ext x 5x 5 sec   Wrist PREs  4lbs x10 reps b/l 2 sets   Flex bar pro/sup/flex/ext/twists  Green [id number] b/l 2 sets   Pronator with dumbbell  2lbs [id number] b/l 2 sets   Digit extension with rubber band  Red [id number] b/l        Ther Act                     Modalities     MHP  x5min b/l hands   ESTM  X10min b/l hand    Patient received PreMod Electric Stimulation to radial side/base of thumb of the bilateral hands at sensory level (80-150HZ) to decrease pain to allow for increased ability to perform ADLs.  2in x 2in electrode padswere used at an average intensity of 11.0 for left hand and 9.4 for right hand Cv votls. Skin was assessed pre and post tx with no adverse effects noted. Therapist attended to patient throughout the entire duration of ESTM treatment to adjust intensity and checking up on patient.

## 2025-04-11 ENCOUNTER — OFFICE VISIT (OUTPATIENT)
Dept: PHYSICAL THERAPY | Facility: CLINIC | Age: 50
End: 2025-04-11
Payer: COMMERCIAL

## 2025-04-11 DIAGNOSIS — M25.512 ACUTE PAIN OF LEFT SHOULDER: Primary | ICD-10-CM

## 2025-04-11 DIAGNOSIS — M54.50 CHRONIC BILATERAL LOW BACK PAIN WITHOUT SCIATICA: ICD-10-CM

## 2025-04-11 DIAGNOSIS — G89.29 CHRONIC BILATERAL LOW BACK PAIN WITHOUT SCIATICA: ICD-10-CM

## 2025-04-11 DIAGNOSIS — M25.552 LEFT HIP PAIN: ICD-10-CM

## 2025-04-11 PROCEDURE — 97110 THERAPEUTIC EXERCISES: CPT

## 2025-04-11 PROCEDURE — 97112 NEUROMUSCULAR REEDUCATION: CPT

## 2025-04-11 NOTE — PROGRESS NOTES
Daily Note     Today's date: 2025  Patient name: José Miguel Lazaro  : 1975  MRN: [id number]  Referring provider: Aldair Boyce DO  Dx:   Encounter Diagnosis     ICD-10-CM    1. Acute pain of left shoulder  M25.512       2. Chronic bilateral low back pain without sciatica  M54.50     G89.29       3. Left hip pain  M25.552           Start Time: 0800  Stop Time: 0825  Total time in clinic (min): 25 minutes    Subjective: Patient states he hasn't had to do too much lifting, left shoulder hasn't been bothering him as much. 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 States his back was bothersome this morning. States he hasn't really done any of the thoracic extension provided in previous session. States he has been primarily doing stretches for his low back. States pain is less frequent overall. States improving his lifting technique has helped his symptoms. States today's session has to be shortened as he has to go to work.      Objective: See treatment diary below      Assessment: Tolerated treatment well. Reviewed/reiterated PT POC during today's session, including review of primary movement impairment, movement coordination deficits. Discussed mobility being important for generalized health, however, muscular endurance and stab work making greatest changes in patient's symptoms in prior sessions. HEP updated/reviewed as a result. Pt verbalized good understanding/agreement with aforementioned education. Improved ability to maintain neutral spine/pelvis w/ RDL noted today, although does req intermittent vc to maintain. Patient will continue to benefit from skilled PT to improve functional mobility and activity tolerance.      Plan: Continue per plan of care.        Insurance Eval/ Re-eval POC expires Auth Status Total visits  Start date  Expiration date Misc   [facility] 3/17/25 5/12/25 APPROVED 12 3/17 6/17                  Date 3/17/25 3/24/25 4/4/25 4/11/25     Visit Number 1 2 3 4     Auth  "1 / 12 2 / 12 3 / 12 4 / 12              Manual                                                      TherEx  Trial REIL - inc       Back extension         T/S ext    5\" x20     LTR    5\" x10 each                                                  Neuro Re-Ed         Sciatic nerve glides  Seated knee ext w/ DF 2x10 bilat       TA progressions TA during lifting         Bridge progressions Bridge w/ ad sq for HEP    Glute sets for HEP Bridge w/ ad sq 5\" 3x15  Regular bridges 2x15     Bent over row    18# KB 3x10 bilat     Suitcase carries  18# 70 feet x3 laps each UE  18# 40 feet x4 laps     RDLs  With dowel, against wall, standing    Trial 18# KB to chair    Heavy edu t/o  RDL to 12\" box 18# 2x10     Split stance + row  GTB 3x10       Palloff press  GTB 3x10 bilat       Resisted trunk rotation  GTB 3x10 bilat       Chops  Mid ranges, 9# 3x8 bilat       Plank progressions  With hip ext 2x10 bilat                TherAct          Pt edu, PT POC, HEP x10 min  Assess left shoulder, pt edu, PT POC, HEP x40 min                                          Gait Training                                    Modalities         CP             Precautions:   Past Medical History:   Diagnosis Date    Anemia     GERD (gastroesophageal reflux disease)                     "

## 2025-04-15 ENCOUNTER — PROCEDURE VISIT (OUTPATIENT)
Age: 50
End: 2025-04-15

## 2025-04-15 VITALS
OXYGEN SATURATION: 98 % | HEIGHT: 67 IN | HEART RATE: 53 BPM | WEIGHT: 169 LBS | TEMPERATURE: 97 F | BODY MASS INDEX: 26.53 KG/M2 | SYSTOLIC BLOOD PRESSURE: 151 MMHG | DIASTOLIC BLOOD PRESSURE: 94 MMHG

## 2025-04-15 DIAGNOSIS — M75.22 BICEPS TENDINITIS OF LEFT UPPER EXTREMITY: Primary | ICD-10-CM

## 2025-04-15 DIAGNOSIS — M99.03 SOMATIC DYSFUNCTION OF SPINE, LUMBAR: ICD-10-CM

## 2025-04-15 DIAGNOSIS — M99.07 SOMATIC DYSFUNCTION OF LEFT UPPER EXTREMITY: ICD-10-CM

## 2025-04-15 DIAGNOSIS — M99.02 SOMATIC DYSFUNCTION OF THORACIC REGION: ICD-10-CM

## 2025-04-15 RX ORDER — NAPROXEN 500 MG/1
500 TABLET ORAL 2 TIMES DAILY WITH MEALS
Qty: 28 TABLET | Refills: 0 | Status: SHIPPED | OUTPATIENT
Start: 2025-04-15

## 2025-04-15 NOTE — PROGRESS NOTES
"Name: José Miguel Lazaro      : 1975      MRN: [id number]  Encounter Provider: Sade Casillas DO  Encounter Date: 4/15/2025   Encounter department: [facility]  :  Assessment & Plan  Biceps tendinitis of left upper extremity    Orders:  •  naproxen (Naprosyn) 500 mg tablet; Take 1 tablet (500 mg total) by mouth 2 (two) times a day with meals    Somatic dysfunction of thoracic region    Orders:  •  OMT    Somatic dysfunction of spine, lumbar    Orders:  •  OMT    Somatic dysfunction of left upper extremity    Orders:  •  OMT    OMT    Performed by: Sade Casillas DO  Authorized by: Ceasar Carvalho DO  Universal Protocol:  Consent: Verbal consent obtained.  Consent given by: patient  Time out: Immediately prior to procedure a \"time out\" was called to verify the correct patient, procedure, equipment, support staff and site/side marked as required.  Patient understanding: patient states understanding of the procedure being performed  Patient consent: the patient's understanding of the procedure matches consent given      Procedure Details:     Region evaluated and treated:  Left Extremities, Thoracic and Lumbar    Extremity Information  Extremities: left upper extremity    Thoracic Information  Thoracic Region: T1 - T4  Thoracic T1 - T4 details:     Examination Method:  Tissue Texture Change, Stability, Laxity, Effusions, Tone, Asymmetry, Misalignment, Crepitation, Defects, Masses, Range of Motion, Contracture, Tenderness, Pain, Passive and Active    Severity:  Moderate    Treatment Method:  Counterstrain Treatment, Indirect Treatment and Soft Tissue Treatment    Response:  Improved    Lumbar details:     Examination Method:  Tissue Texture Change, Stability, Laxity, Effusions, Tone, Asymmetry, Misalignment, Crepitation, Defects, Masses, Range of Motion, Contracture, Tenderness, Pain, Passive and Active    Severity:  Moderate    Treatment Method:  Soft Tissue " "Treatment and Myofascial Release Treatment    Response:  Improved - The somatic dysfunction is improved but not completely resolved.    Left Upper Extremity details:     Examination Method:  Tissue Texture Change, Stability, Laxity, Effusions, Tone, Asymmetry, Misalignment, Crepitation, Defects, Masses, Range of Motion, Contracture, Tenderness, Pain, Passive and Active    Severity:  Moderate    Treatment Method:  Myofascial Release Treatment and Soft Tissue Treatment    Response:  Improved - The somatic dysfunction is improved but not completely resolved.    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

## 2025-04-17 ENCOUNTER — OFFICE VISIT (OUTPATIENT)
Dept: OCCUPATIONAL THERAPY | Facility: CLINIC | Age: 50
End: 2025-04-17
Payer: COMMERCIAL

## 2025-04-17 DIAGNOSIS — M65.4 TENOSYNOVITIS, DE QUERVAIN: Primary | ICD-10-CM

## 2025-04-17 PROCEDURE — 97110 THERAPEUTIC EXERCISES: CPT

## 2025-04-17 NOTE — PROGRESS NOTES
Daily Note     Today's date: 2025  Patient name: José Miguel Lazaro  : 1975  MRN: [id number]  Referring provider: Aldair Boyce DO  Dx:   Encounter Diagnosis     ICD-10-CM    1. Tenosynovitis, de Quervain  M65.4                      Subjective: Patient notes he's been doing pretty well until the weekend he felt a little twinge in the wrist.       Objective: See treatment diary below      Assessment:   Patient tolerated session well. Session focused on HEP education, range of motion, patient education, strengthening, kinesio tape, ESTM modality for pain reduction and appropriate bracing to improve functional task performance with daily activities. Patient tolerated all TE and modalities with good tolerance and no complaints. Patient progressing well towards goals. Patient benefiting from skilled hand therapy OT to reduce deficits to improve independence with daily activities.        Plan:   Focus on ROM, stretching, functional strength, kinesio tape, TE, TA, NMRE, UE strengthening and all modalities as seen fit to improve ability to complete daily activites with ease.    POC 3/13/25 - 25         Auth Tracker  Auth Status Total   Visits  Auth start date Expiration date Co-Insurance   Approved 12 3/13/25 6/13/25                                    Visit Tracker  Date 3/13  IE 3/27 4/3 4/10 4/17        RE  X                                                                      PMHx:   has a past medical history of Anemia and GERD (gastroesophageal reflux disease).    Precautions:   For all exercises and treatment interventions listed below; patient educated to complete within tolerance and pain threshold. Patient educated if symptoms increase or pain becomes intolerable they should discontinue and/or reduce intensity/frequency.     Manuals HEP 2025                       Ther Ex     Education on HEP and dx  x5min                  Ktape widget  X5min  b/l hands   FDC stretch  5x5sec   PROM wrist  flex/ext x 5x 5 sec   Wrist PREs  4lbs x10 reps b/l 2 sets   Flex bar pro/sup/flex/ext/twists  Green [id number] b/l 2 sets   Pronator with dumbbell  2lbs [id number] b/l 2 sets   Digit extension with rubber band  Red [id number] b/l        Ther Act                     Modalities     MHP  x5min b/l hands   ESTM  X10min b/l hand    Patient received PreMod Electric Stimulation to radial side/base of thumb of the bilateral hands at sensory level (80-150HZ) to decrease pain to allow for increased ability to perform ADLs.  2in x 2in electrode padswere used at an average intensity of 11.0 for left hand and 9.4 for right hand Cv votls. Skin was assessed pre and post tx with no adverse effects noted. Therapist attended to patient throughout the entire duration of ESTM treatment to adjust intensity and checking up on patient.

## 2025-04-18 ENCOUNTER — APPOINTMENT (OUTPATIENT)
Dept: PHYSICAL THERAPY | Facility: CLINIC | Age: 50
End: 2025-04-18
Payer: COMMERCIAL

## 2025-04-22 ENCOUNTER — PROCEDURE VISIT (OUTPATIENT)
Age: 50
End: 2025-04-22

## 2025-04-22 VITALS
HEART RATE: 50 BPM | SYSTOLIC BLOOD PRESSURE: 119 MMHG | BODY MASS INDEX: 27.48 KG/M2 | RESPIRATION RATE: 17 BRPM | OXYGEN SATURATION: 98 % | WEIGHT: 171 LBS | HEIGHT: 66 IN | DIASTOLIC BLOOD PRESSURE: 73 MMHG

## 2025-04-22 DIAGNOSIS — G89.29 CHRONIC BILATERAL LOW BACK PAIN WITHOUT SCIATICA: ICD-10-CM

## 2025-04-22 DIAGNOSIS — M54.50 CHRONIC BILATERAL LOW BACK PAIN WITHOUT SCIATICA: ICD-10-CM

## 2025-04-22 DIAGNOSIS — M99.03 SOMATIC DYSFUNCTION OF LUMBAR REGION: ICD-10-CM

## 2025-04-22 DIAGNOSIS — M99.07 SOMATIC DYSFUNCTION OF LEFT UPPER EXTREMITY: Primary | ICD-10-CM

## 2025-04-22 DIAGNOSIS — M25.512 ACUTE PAIN OF LEFT SHOULDER: ICD-10-CM

## 2025-04-22 DIAGNOSIS — M99.04 SOMATIC DYSFUNCTION OF SACRAL REGION: ICD-10-CM

## 2025-04-22 PROCEDURE — 99213 OFFICE O/P EST LOW 20 MIN: CPT | Performed by: FAMILY MEDICINE

## 2025-04-22 PROCEDURE — 98926 OSTEOPATH MANJ 3-4 REGIONS: CPT | Performed by: FAMILY MEDICINE

## 2025-04-22 NOTE — PROGRESS NOTES
"Name: José Miguel Lazaro      : 1975      MRN: [id number]  Encounter Provider: Ailyn Callahan DO  Encounter Date: 2025   Encounter department: [facility]  OMT Procedure Visit    Assessment/Plan:     1. Somatic dysfunction of left upper extremity  -     OMT  2. Somatic dysfunction of sacral region  -     OMT  3. Somatic dysfunction of lumbar region  -     OMT  4. Acute pain of left shoulder  -     OMT  5. Chronic bilateral low back pain without sciatica  -     OMT       Regions treated may be sore   Drink plenty of water for 1-2 days after treatment   Can use ice for 10-20 min on/off to help with soreness/inflammation   Can take Advil (ibuprofen) to help with pain/soreness      Return in about 1 week (around 2025) for Procedure visit, OMT Treatment.     Subjective:   HPI  José Miguel Lazaro is a 50 y.o. male who presents for OMT retreatment. Patient states he has pain in the lower back on the left side and right shoulder.   Goes to PT for right shoulder which has been helping.   Had OMT last week for the same, states it helped as well.   Was prescribed naproxen at last OMT visit, but did not start it until yesterday. States that he noticed improvement with OMT/PT/naproxen combination.   Still has regularly scheduled PT and OT.      Review of Systems   Musculoskeletal:  Positive for arthralgias (right shoulder), back pain and myalgias.   Neurological:  Negative for dizziness and headaches.   All other systems reviewed and are negative.       Objective:     /73 (BP Location: Right arm, Patient Position: Sitting)   Pulse (!) 50   Resp 17   Ht 5' 6\" (1.676 m)   Wt 77.6 kg (171 lb)   SpO2 98%   BMI 27.60 kg/m²      Physical Exam  Constitutional:       General: He is not in acute distress.     Appearance: He is normal weight.   HENT:      Head: Normocephalic and atraumatic.   Eyes:      Extraocular Movements: Extraocular movements intact.      " Conjunctiva/sclera: Conjunctivae normal.   Cardiovascular:      Rate and Rhythm: Bradycardia present.   Pulmonary:      Effort: Pulmonary effort is normal.   Musculoskeletal:      Right shoulder: Decreased range of motion.      Cervical back: Normal range of motion.      Left hip: Decreased range of motion (pain with bending forward).   Skin:     General: Skin is warm.   Neurological:      General: No focal deficit present.      Mental Status: He is alert.   Psychiatric:         Mood and Affect: Mood normal.         Behavior: Behavior normal.         OMT    Performed by: Ailyn Callahan DO  Authorized by: Ailyn Callahan DO  Universal Protocol:  Consent: Verbal consent obtained.  Consent given by: patient  Patient understanding: patient states understanding of the procedure being performed  Patient identity confirmed: verbally with patient      Procedure Details:     Region evaluated and treated:  Sacrum/Pelvis, Right Extremities and Lumbar    Extremity Information  Extremities: right upper extremity    Lumbar details:     Examination Method:  Tissue Texture Change, Stability, Laxity, Effusions, Tone, Asymmetry, Misalignment, Crepitation, Defects, Masses, Range of Motion, Contracture and Tenderness, Pain    Severity:  Moderate    Treatment Method:  Direct Treatment, Indirect Treatment, Soft Tissue Treatment and Myofascial Release Treatment    Response:  Improved - The somatic dysfunction is improved but not completely resolved.    Sacrum/Pelvis details:     Examination Method:  Tissue Texture Change, Stability, Laxity, Effusions, Tone, Asymmetry, Misalignment, Crepitation, Defects, Masses, Range of Motion, Contracture and Tenderness, Pain    Severity:  Moderate    Treatment Method:  Direct Treatment, Articulatory Treatment, Indirect Treatment, Myofascial Release Treatment and Soft Tissue Treatment    Response:  Improved - The somatic dysfunction is improved but not completely resolved.    Right Upper Extremity details:      Examination Method:  Tissue Texture Change, Stability, Laxity, Effusions, Tone, Asymmetry, Misalignment, Crepitation, Defects, Masses, Range of Motion, Contracture and Tenderness, Pain    Severity:  Moderate    Treatment Method:  Articulatory Treatment, Direct Treatment, Indirect Treatment, Muscle Energy Treatment, Myofascial Release Treatment and Soft Tissue Treatment    Response:  Improved - The somatic dysfunction is improved but not completely resolved.    Total Regions Treated:  3         ** Please Note: This note may have been constructed using a voice recognition system **

## 2025-04-24 ENCOUNTER — OFFICE VISIT (OUTPATIENT)
Dept: OCCUPATIONAL THERAPY | Facility: CLINIC | Age: 50
End: 2025-04-24
Payer: COMMERCIAL

## 2025-04-24 DIAGNOSIS — M65.4 TENOSYNOVITIS, DE QUERVAIN: Primary | ICD-10-CM

## 2025-04-24 PROCEDURE — 97110 THERAPEUTIC EXERCISES: CPT

## 2025-04-24 PROCEDURE — 97032 APPL MODALITY 1+ESTIM EA 15: CPT

## 2025-04-24 NOTE — PROGRESS NOTES
Daily Note     Today's date: 2025  Patient name: José Miguel Lazaro  : 1975  MRN: [id number]  Referring provider: Aldair Boyce DO  Dx:   Encounter Diagnosis     ICD-10-CM    1. Tenosynovitis, de Quervain  M65.4                      Subjective: Patient notes he's feeling much better lately. Patient notes he thinks he will want to discharge after next scheduled appointment.       Objective: See treatment diary below      Assessment:   Patient tolerated session well. Session focused on HEP education, range of motion, patient education, strengthening, kinesio tape, ESTM modality for pain reduction and appropriate bracing to improve functional task performance with daily activities. Patient tolerated all TE and modalities with good tolerance and no complaints. Patient progressing well towards goals. Patient benefiting from skilled hand therapy OT to reduce deficits to improve independence with daily activities.        Plan:   Focus on ROM, stretching, functional strength, kinesio tape, TE, TA, NMRE, UE strengthening and all modalities as seen fit to improve ability to complete daily activites with ease.    POC 3/13/25 - 25         Auth Tracker  Auth Status Total   Visits  Auth start date Expiration date Co-Insurance   Approved 12 3/13/25 6/13/25                                    Visit Tracker  Date 3/13  IE 3/27 4/3 4/10 4/17 4/24       RE  X                                                                      PMHx:   has a past medical history of Anemia and GERD (gastroesophageal reflux disease).    Precautions:   For all exercises and treatment interventions listed below; patient educated to complete within tolerance and pain threshold. Patient educated if symptoms increase or pain becomes intolerable they should discontinue and/or reduce intensity/frequency.     Manuals HEP 2025                       Ther Ex     Education on HEP and dx  x5min                  Ktape widget  X5min  b/l hands    FDC stretch  5x5sec   PROM wrist flex/ext x 5x 5 sec   Wrist PREs  4lbs x10 reps b/l 2 sets   Flex bar pro/sup/flex/ext/twists  Green [id number] b/l 2 sets   Pronator with dumbbell  2lbs [id number] b/l 2 sets   Digit extension with rubber band  Red [id number] b/l        Ther Act                     Modalities     MHP     ESTM  X10min b/l hand    Patient received PreMod Electric Stimulation to radial side/base of thumb of the bilateral hands at sensory level (80-150HZ) to decrease pain to allow for increased ability to perform ADLs.  2in x 2in electrode padswere used at an average intensity of 11.0 for left hand and 9.4 for right hand Cv votls. Skin was assessed pre and post tx with no adverse effects noted. Therapist attended to patient throughout the entire duration of ESTM treatment to adjust intensity and checking up on patient.

## 2025-04-28 ENCOUNTER — OFFICE VISIT (OUTPATIENT)
Dept: PHYSICAL THERAPY | Facility: CLINIC | Age: 50
End: 2025-04-28
Payer: COMMERCIAL

## 2025-04-28 DIAGNOSIS — M54.50 CHRONIC BILATERAL LOW BACK PAIN WITHOUT SCIATICA: ICD-10-CM

## 2025-04-28 DIAGNOSIS — M25.512 ACUTE PAIN OF LEFT SHOULDER: Primary | ICD-10-CM

## 2025-04-28 DIAGNOSIS — M25.552 LEFT HIP PAIN: ICD-10-CM

## 2025-04-28 DIAGNOSIS — G89.29 CHRONIC BILATERAL LOW BACK PAIN WITHOUT SCIATICA: ICD-10-CM

## 2025-04-28 PROCEDURE — 97530 THERAPEUTIC ACTIVITIES: CPT

## 2025-04-28 PROCEDURE — 97112 NEUROMUSCULAR REEDUCATION: CPT

## 2025-04-28 NOTE — PROGRESS NOTES
Progress Note    Today's date: 2025  Patient name: José Miguel Lazaro  : 1975  MRN: [id number]  Referring provider: Aldair Boyce DO  Dx:   Encounter Diagnosis     ICD-10-CM    1. Acute pain of left shoulder  M25.512       2. Chronic bilateral low back pain without sciatica  M54.50     G89.29       3. Left hip pain  M25.552           Start Time: 0800  Stop Time: 0840  Total time in clinic (min): 40 minutes    Subjective: Patient reports overall improvement in his back pain since starting physical therapy. States his back was bothersome last week, unsure why. States he didn't try HEP during times of increased back pain. States his left shoulder is doing well overall, has not had pain since PT eval.    Goals  Short Term Goals (4 weeks):  - all met at re-eval on 25  - Patient will be independent in basic HEP 2-3 weeks  - Patient will report >50% reduction in pain  - Patient will demonstrate >1/3 improvement in MMT grade as applicable  - Patient will demo pain free lumbar AROM    Long Term Goals (8 weeks):  - Patient will be independent in a comprehensive home exercise program - not met  - Patient FOTO score will improve >10 points - met  - Patient will self-report >75% improvement in function - met  - Patient will deny pain with bending/stooping - partially met  - Patient will carry tool bag without pain - met      Additional goals added for left shoulder, set for 6 weeks:  - Patient will demo full, pain free shoulder elevation - met  - Patient will lift work bag from truck without shoulder pain - met  - MMT equal bilaterally - met      Objective: See treatment diary below    Lumbar Spine Exam  Postural Assessment (25: pelvic alignment WNL)  - Posture in Sitting: rounded posture  - Posture in Standing: left lateral shift    Sensation (25: same)  - Light touch: grossly intact and equal bilaterally    LE MMT (25: grossly similar)  LEFT  RIGHT  -Hip Flexion:     -Hip  Abduction: 4/5 4/5  -Hip Adduction: 4/5 4/5  -Hip IR:  4/5 4/5  -Hip ER:  4/5 4/5    -Knee Flexion  4/5 4/5  -Knee Extension 4/5 4/5    -Ankle DF  5/5 5/5  -Ankle PF  5/5 5/5    Lumbar Spine Range of Motion  Flexion:  Moderately limited  with pain (4/28/25: minimally limited without pain)  Extension:  Minimally limited  without pain (4/28/25: minimally limited without pain)  Lateral Flexion - Left:  Moderately limited  with pain (4/28/25: moderately limited with tightness)  Lateral Flexion - Right:  Moderately limited  with pain (4/28/25: moderately limited with tightness)  Rotation - Left:  Moderately limited  without pain (4/28/25: moderately limited with tightness)  Rotation - Right:  Moderately limited  with pain (4/28/25: moderately limited with tightness)    Hip Range of Motion    LEFT    RIGHT  - Flexion 115 *p (4/28/25: 115)  115  - IR  45 *p (4/28/25: 45)  45  - ER  60 (4/28/25: 60)  60    Joint Play (4/28/25: WNL)  T10: Hypomobile  T11: Hypomobile  T12: Hypomobile  L1: Hypomobile  L2: Hypomobile  L3: Hypomobile and Pain  L4: Hypomobile and Pain  L5: Hypomobile and Pain    Diagnostic Tests Performed (4/28/25: positive prone instability, slump WNL)  Positive: Prone Instability and Slump (on left)   Negative: SI Compression and SI Distraction, Thigh Thrust    Shoulder Exam  Shoulder AROM (4/28/25: full, pain free range noted)  - Flexion: full, pain at EOR   - Abduction: full, apprehension noted  - ER: equal bilaterally  - IR: equal bilaterally    Shoulder PROM (4/28/25: WNL)  - Mild end range restriction in all planes    UE MMT (L - R)  - Shoulder Flexion: 4/5 (4/28/25: 4+/5) 4+/5  - Shoulder Abduction: 4/5 *p (4/28/25: 4/5) 4+/5  - Shoulder ER: 4/5 (4/28/25: 4+/5)  4+/5  - Shoulder IR: 4+/5 (4/28/25: 4+/5)   4+/5  - Elbow Flexion: 4+/5 (4/28/25: 4+/5)  4+/5  - Elbow Extension: 4+/5 (4/28/25: 4+/5)  4+/5  - Middle Trap: 4-/5 (4/28/25: 4/5)  4/5    Cervical AROM (4/28/25: same)  - Minimal restriction,  "pain free in all planes    Thoracic AROM (4/28/25: same)  - Moderate restriction, pain free in all planes    Mobility Assessment (4/28/25: grossly similar)  - Hypomobility noted mid-thoracic spine  - Hypomobility noted left ACJ (inferior > AP)  - Mild deficits w/ inferior GHJ on left    Scapular Positioning (4/28/25: grossly similar)  - Static: abducted positioning on left    Diagnostic Testing (4/28/25: WNL)  - Positive: montes-neo  - Negative: neer, empty can, drop arm      Assessment: Patient has made gains in strength, range of motion, and functional mobility since starting physical therapy in regards to both LBP and left shoulder pain. He has demonstrated improved tolerance to lumbar flexion and lifting, although intermittent/inconsistent irritability still noted. As the patient is progressing well towards previously set goals and with mild lingering impairments, patient remains a strong candidate to continue to benefit from skilled PT services. PT POC and HEP were updated/reviewed during today's session and patient expressed no questions/concerns with continuation of care.      Plan: Continue per plan of care.   PT POC: 1x/week for 4 weeks (4/28/25 - 5/26/25)       Insurance Eval/ Re-eval POC expires Auth Status Total visits  Start date  Expiration date Misc   [facility] 3/17/25 5/12/25 APPROVED 12 3/17 6/17     4/28 5/26            Date 3/17/25 3/24/25 4/4/25 4/11/25 4/28/25    Visit Number 1 2 3 4 5    Auth 1 / 12 2 / 12 3 / 12 4 / 12 5 / 12             Manual                                                      TherEx  Trial REIL - inc       Back extension         T/S ext    5\" x20     LTR    5\" x10 each                                                  Neuro Re-Ed         Sciatic nerve glides  Seated knee ext w/ DF 2x10 bilat       TA progressions TA during lifting         Bridge progressions Bridge w/ ad sq for HEP    Glute sets for HEP Bridge w/ ad sq 5\" 3x15  Regular bridges 2x15 Bridge w/ hip abd iso, " "15# DB 3x15    Bent over row    18# KB 3x10 bilat     Suitcase carries  18# 70 feet x3 laps each UE  18# 40 feet x4 laps 26# 50 feet x3 laps each UE    RDLs  With dowel, against wall, standing    Trial 18# KB to chair    Heavy edu t/o  RDL to 12\" box 18# 2x10 Good mornings against wall 15# 3x10    Split stance + row  GTB 3x10   Split stance + trunk rotation: green tubing 2x10 bilat    Palloff press  GTB 3x10 bilat       Resisted trunk rotation  GTB 3x10 bilat       Chops  Mid ranges, 9# 3x8 bilat   9# KB 3x10 bilat    Plank progressions  With hip ext 2x10 bilat       OH press     Unilat, 9# KB 3x10 bilat             TherAct          Pt edu, PT POC, HEP x10 min  Assess left shoulder, pt edu, PT POC, HEP x40 min  Pt edu, PT POC, HEP x10 min                                        Gait Training                                    Modalities         CP             Precautions:   Past Medical History:   Diagnosis Date    Anemia     GERD (gastroesophageal reflux disease)                       "

## 2025-04-29 ENCOUNTER — PROCEDURE VISIT (OUTPATIENT)
Age: 50
End: 2025-04-29

## 2025-04-29 VITALS
OXYGEN SATURATION: 99 % | WEIGHT: 171.7 LBS | DIASTOLIC BLOOD PRESSURE: 98 MMHG | HEIGHT: 66 IN | BODY MASS INDEX: 27.59 KG/M2 | HEART RATE: 52 BPM | TEMPERATURE: 98.1 F | SYSTOLIC BLOOD PRESSURE: 152 MMHG | RESPIRATION RATE: 18 BRPM

## 2025-04-29 DIAGNOSIS — G89.29 CHRONIC LEFT-SIDED LOW BACK PAIN WITHOUT SCIATICA: ICD-10-CM

## 2025-04-29 DIAGNOSIS — M99.04 SOMATIC DYSFUNCTION OF SACRAL REGION: ICD-10-CM

## 2025-04-29 DIAGNOSIS — M99.02 SOMATIC DYSFUNCTION OF THORACIC REGION: ICD-10-CM

## 2025-04-29 DIAGNOSIS — M99.03 SOMATIC DYSFUNCTION OF LUMBAR REGION: Primary | ICD-10-CM

## 2025-04-29 DIAGNOSIS — M54.50 CHRONIC LEFT-SIDED LOW BACK PAIN WITHOUT SCIATICA: ICD-10-CM

## 2025-04-29 PROCEDURE — 98925 OSTEOPATH MANJ 1-2 REGIONS: CPT | Performed by: FAMILY MEDICINE

## 2025-04-29 PROCEDURE — 99213 OFFICE O/P EST LOW 20 MIN: CPT | Performed by: FAMILY MEDICINE

## 2025-04-29 NOTE — PROGRESS NOTES
"Name: José Miguel Lazaro      : 1975      MRN: [id number]  Encounter Provider: Fracisco Banuelos DO  Encounter Date: 2025   Encounter department: [facility]  :  Assessment & Plan  Somatic dysfunction of thoracic region    Orders:    OMT    Somatic dysfunction of lumbar region    Orders:    OMT    Somatic dysfunction of sacral region    Orders:    OMT    Chronic left-sided low back pain without sciatica                History of Present Illness   History of chronic left lower back pain.  Works in small appliance repair.  Job involves up and down movement.  Has had good results with OMT treatments in the past.  Today presents with some left lumbar tightness.  No radiation.  No weakness.       Review of Systems   Musculoskeletal:  Positive for back pain.       Objective   /98 (BP Location: Left arm, Patient Position: Sitting, Cuff Size: Large)   Pulse (!) 52   Temp 98.1 °F (36.7 °C) (Tympanic)   Resp 18   Ht 5' 6\" (1.676 m)   Wt 77.9 kg (171 lb 11.2 oz)   SpO2 99%   BMI 27.71 kg/m²      Physical Exam  Musculoskeletal:         General: Tenderness present.       OMT    Performed by: Fracisco Banuelos DO  Authorized by: Fracisco Banuelos DO  Universal Protocol:  procedure performed by consultantConsent: Verbal consent obtained. Written consent not obtained.  Risks and benefits: risks, benefits and alternatives were discussed  Consent given by: patient  Time out: Immediately prior to procedure a \"time out\" was called to verify the correct patient, procedure, equipment, support staff and site/side marked as required.  Timeout called at: 2025 8:05 AM.  Patient understanding: patient states understanding of the procedure being performed  Patient consent: the patient's understanding of the procedure matches consent given  Procedure consent: procedure consent matches procedure scheduled  Relevant documents: relevant documents present and verified  Test " results: test results not available  Site marked: the operative site was not marked  Patient identity confirmed: verbally with patient      Procedure Details:     Region evaluated and treated:  Lumbar and Thoracic    Thoracic Information  Thoracic Region: T5 - T9 and T10 - T12  Thoracic T5 - T9 details:     Examination Method:  Tissue Texture Change, Stability, Laxity, Effusions, Tone, Tenderness, Pain, Passive and Active    Severity:  Mild    Osteopathic Findings:  T5 - T9 on Right Rotated Right Side bent left.        Increased tissue texture changes in the throracic and lumbar area .     Treatment Method:  High Velocity, Low Amplitude Treatment, Myofascial Release Treatment, Muscle Energy Treatment and Soft Tissue Treatment    Response:  Improved - The somatic dysfunction is improved but not completely resolved.    Thoracic T10 - T12 details:     Examination Method:  Tissue Texture Change, Stability, Laxity, Effusions, Tone, Passive and Active    Severity:  Mild    Osteopathic Findings:  T10 - T9 myofascial tightness        Increased tissue texture changes in the throracic and lumbar area .     Treatment Method:  Myofascial Release Treatment and Muscle Energy Treatment    Response:  Improved - The somatic dysfunction is improved but not completely resolved.    Lumbar details:     Examination Method:  Tissue Texture Change, Stability, Laxity, Effusions, Tone, Tenderness, Pain, Passive and Active    Severity:  Mild    Osteopathic Findings:  L1-L5 on Left Rotated Left Side bent left.      Myofascial tightness    Treatment Method:  Muscle Energy Treatment, High Velocity, Low Amplitude Treatment and Myofascial Release Treatment    Response:  Improved - The somatic dysfunction is improved but not completely resolved.    Total Regions Treated:  2

## 2025-05-19 ENCOUNTER — TELEPHONE (OUTPATIENT)
Dept: ENDOCRINOLOGY | Facility: CLINIC | Age: 50
End: 2025-05-19

## 2025-06-03 ENCOUNTER — PROCEDURE VISIT (OUTPATIENT)
Age: 50
End: 2025-06-03

## 2025-06-03 VITALS
SYSTOLIC BLOOD PRESSURE: 130 MMHG | RESPIRATION RATE: 18 BRPM | BODY MASS INDEX: 27.84 KG/M2 | HEART RATE: 62 BPM | WEIGHT: 172.5 LBS | TEMPERATURE: 97.7 F | DIASTOLIC BLOOD PRESSURE: 80 MMHG | OXYGEN SATURATION: 99 %

## 2025-06-03 DIAGNOSIS — M99.02 SOMATIC DYSFUNCTION OF THORACIC REGION: ICD-10-CM

## 2025-06-03 DIAGNOSIS — M99.07 SOMATIC DYSFUNCTION OF LEFT UPPER EXTREMITY: ICD-10-CM

## 2025-06-03 DIAGNOSIS — M65.4 TENOSYNOVITIS, DE QUERVAIN: ICD-10-CM

## 2025-06-03 DIAGNOSIS — G89.29 CHRONIC BILATERAL LOW BACK PAIN WITHOUT SCIATICA: ICD-10-CM

## 2025-06-03 DIAGNOSIS — M54.50 CHRONIC BILATERAL LOW BACK PAIN WITHOUT SCIATICA: ICD-10-CM

## 2025-06-03 DIAGNOSIS — M25.552 LEFT HIP PAIN: ICD-10-CM

## 2025-06-03 DIAGNOSIS — M99.03 SOMATIC DYSFUNCTION OF LUMBAR REGION: ICD-10-CM

## 2025-06-03 DIAGNOSIS — M25.512 ACUTE PAIN OF LEFT SHOULDER: Primary | ICD-10-CM

## 2025-06-03 DIAGNOSIS — M99.04 SOMATIC DYSFUNCTION OF SACRAL REGION: ICD-10-CM

## 2025-06-03 PROCEDURE — 99214 OFFICE O/P EST MOD 30 MIN: CPT | Performed by: ORTHOPAEDIC SURGERY

## 2025-06-03 PROCEDURE — 98926 OSTEOPATH MANJ 3-4 REGIONS: CPT | Performed by: ORTHOPAEDIC SURGERY

## 2025-06-07 NOTE — PROGRESS NOTES
Name: José Miguel Lazaro      : 1975      MRN: [id number]  Encounter Provider: Ailyn Callahan DO  Encounter Date: 6/3/2025   Encounter department: [facility]    OMT Procedure Visit     Assessment/Plan:     1. Acute pain of left shoulder  -     Ambulatory Referral to Physical Therapy; Future  -     OMT  2. Chronic bilateral low back pain without sciatica  -     Ambulatory Referral to Physical Therapy; Future  -     OMT  3. Left hip pain  -     Ambulatory Referral to Physical Therapy; Future  -     OMT  4. Tenosynovitis, de Quervain  -     Ambulatory Referral to Occupational Therapy; Future  5. Somatic dysfunction of lumbar region  -     OMT  6. Somatic dysfunction of left upper extremity  -     OMT  7. Somatic dysfunction of sacral region  -     OMT  8. Somatic dysfunction of thoracic region  -     OMT       Regions treated may be sore   Drink plenty of water for 1-2 days after treatment   Can use ice for 10-20 min on/off to help with soreness/inflammation   Can take Advil (ibuprofen) to help with pain/soreness   Renewed referral for physical therapy and occupational therapy      No follow-ups on file.     Subjective:   HPI  José Miguel Lazaro is a 50 y.o. male who presents for OMT retreatment. Patient states he has pain in the right shoulder and left lower back.   Has been going to PT and OT which is also helping, but needs new referrals to continue going.      Review of Systems   Musculoskeletal:  Positive for arthralgias (right shoulder), back pain and myalgias.   All other systems reviewed and are negative.       Objective:     /80 (BP Location: Left arm, Patient Position: Sitting, Cuff Size: Standard)   Pulse 62   Temp 97.7 °F (36.5 °C) (Tympanic)   Resp 18   Wt 78.2 kg (172 lb 8 oz)   SpO2 99%   BMI 27.84 kg/m²      Physical Exam  Constitutional:       General: He is not in acute distress.     Appearance: He is normal weight.   HENT:      Head: Normocephalic  and atraumatic.     Eyes:      Extraocular Movements: Extraocular movements intact.      Conjunctiva/sclera: Conjunctivae normal.       Cardiovascular:      Rate and Rhythm: Normal rate.   Pulmonary:      Effort: Pulmonary effort is normal.     Musculoskeletal:      Right shoulder: Decreased range of motion.      Cervical back: Normal range of motion.      Left hip: Decreased range of motion (pain with bending forward).     Skin:     General: Skin is warm.     Neurological:      General: No focal deficit present.      Mental Status: He is alert.     Psychiatric:         Mood and Affect: Mood normal.         Behavior: Behavior normal.         OMT    Performed by: Ailyn Callahan DO  Authorized by: Ailyn Callahan DO    Universal Protocol:  Consent: Verbal consent obtained  Consent given by: patient  Patient understanding: patient states understanding of the procedure being performed      Procedure Details:     Region evaluated and treated:  Right Extremities, Sacrum/Pelvis, Thoracic and Lumbar    Extremity Information  Extremities: right upper extremity    Thoracic Information  Thoracic Region: T10 - T12  Thoracic T10 - T12 details:     Examination Method:  Tissue Texture Change, Stability, Laxity, Effusions, Tone, Asymmetry, Misalignment, Crepitation, Defects, Masses, Range of Motion, Contracture and Tenderness, Pain    Severity:  Moderate    Treatment Method:  Direct Treatment, Myofascial Release Treatment and Soft Tissue Treatment    Response:  Improved - The somatic dysfunction is improved but not completely resolved.    Lumbar details:     Examination Method:  Tissue Texture Change, Stability, Laxity, Effusions, Tone, Asymmetry, Misalignment, Crepitation, Defects, Masses, Range of Motion, Contracture and Tenderness, Pain    Severity:  Moderate    Treatment Method:  Counterstrain Treatment, Direct Treatment, Indirect Treatment, Myofascial Release Treatment and Soft Tissue Treatment    Response:  Improved - The somatic  dysfunction is improved but not completely resolved.    Sacrum/Pelvis details:     Examination Method:  Tissue Texture Change, Stability, Laxity, Effusions, Tone, Asymmetry, Misalignment, Crepitation, Defects, Masses, Range of Motion, Contracture and Tenderness, Pain    Severity:  Moderate    Treatment Method:  Direct Treatment, Indirect Treatment, Muscle Energy Treatment, Myofascial Release Treatment and Soft Tissue Treatment    Response:  Improved - The somatic dysfunction is improved but not completely resolved.    Right Upper Extremity details:     Examination Method:  Tissue Texture Change, Stability, Laxity, Effusions, Tone, Asymmetry, Misalignment, Crepitation, Defects, Masses, Range of Motion, Contracture and Tenderness, Pain    Severity:  Moderate    Treatment Method:  Direct Treatment, Indirect Treatment, Myofascial Release Treatment and Soft Tissue Treatment    Response:  Improved - The somatic dysfunction is improved but not completely resolved.    Total Regions Treated:  4         ** Please Note: This note may have been constructed using a voice recognition system **

## 2025-06-11 ENCOUNTER — EVALUATION (OUTPATIENT)
Dept: PHYSICAL THERAPY | Facility: CLINIC | Age: 50
End: 2025-06-11
Payer: COMMERCIAL

## 2025-06-11 ENCOUNTER — EVALUATION (OUTPATIENT)
Dept: OCCUPATIONAL THERAPY | Facility: CLINIC | Age: 50
End: 2025-06-11
Payer: COMMERCIAL

## 2025-06-11 DIAGNOSIS — M25.512 ACUTE PAIN OF LEFT SHOULDER: Primary | ICD-10-CM

## 2025-06-11 DIAGNOSIS — M54.50 CHRONIC BILATERAL LOW BACK PAIN WITHOUT SCIATICA: ICD-10-CM

## 2025-06-11 DIAGNOSIS — G89.29 CHRONIC BILATERAL LOW BACK PAIN WITHOUT SCIATICA: ICD-10-CM

## 2025-06-11 DIAGNOSIS — M25.552 LEFT HIP PAIN: ICD-10-CM

## 2025-06-11 DIAGNOSIS — M65.4 TENOSYNOVITIS, DE QUERVAIN: Primary | ICD-10-CM

## 2025-06-11 PROCEDURE — 97110 THERAPEUTIC EXERCISES: CPT

## 2025-06-11 PROCEDURE — 97530 THERAPEUTIC ACTIVITIES: CPT

## 2025-06-11 NOTE — PROGRESS NOTES
PT Re-Evaluation     Today's date: 2025  Patient name: José Miguel Lazaro  : 1975  MRN: [id number]  Referring provider: Aldair Boyce DO  Dx:   Encounter Diagnosis     ICD-10-CM    1. Acute pain of left shoulder  M25.512 PT plan of care cert/re-cert      2. Chronic bilateral low back pain without sciatica  M54.50 PT plan of care cert/re-cert    G89.29       3. Left hip pain  M25.552 PT plan of care cert/re-cert          Start Time: 830  Stop Time: 915  Total time in clinic (min): 45 minutes    Assessment  Impairments: abnormal gait, abnormal muscle firing, abnormal muscle tone, abnormal or restricted ROM, activity intolerance, impaired balance, impaired physical strength, lacks appropriate home exercise program, pain with function, poor posture , poor body mechanics and endurance    Assessment details: PT is a 50 y.o male referred to OPPT for acute L shoulder pain, chronic bilateral LBP and L hip pain. Pt has symptoms of pain with resisted ER, pain with functional mobility, pain with lifitng objects from the ground, pain with standing from sitting or lying position. Pt has neuropathic symptoms in shoulder and nociceptive symptoms in lumbar region. Positive prognostic indicators is motivated to return, age, and absence of peripheralization I LE. Negative indicators is chronicity of symptoms, peripheralization in LUE, high symptom irritability. Pt found relief with lumbar extension. Performed mechanical exam for LUE pain and weakness. Sent Patient home with repeated shoulder extension with IR to address L shoulder weakness and pain.   Understanding of Dx/Px/POC: good     Prognosis: fair    Goals  Short Term Goals (4 weeks)  1. Pain free squat to lift things from the ground for improved QoL and work tasks  2. Pt will >4/5 ER strength in LUE  3.  Pt will be able to ambulate >1000 ft with <3/10 pain for community ambulation    Long term goals (8 weeks)  1. Pt will improve FOTO by 10 to meet MDC  2. Pt will  report >75% improement in function  3. PT will deny difficulty sleeping    Plan  Patient would benefit from: PT eval and skilled physical therapy  Planned modality interventions: biofeedback and TENS  Other planned modality interventions: dry needling, traction, cupping    Planned therapy interventions: joint mobilization, kinesiology taping, manual therapy, nerve gliding, neuromuscular re-education, postural training, strengthening, stretching, home exercise program, functional ROM exercises, flexibility, body mechanics training, behavior modification, balance, activity modification, abdominal trunk stabilization and breathing training    Frequency: 1-2x week  Duration in weeks: 8  Plan of Care beginning date: 2025  Plan of Care expiration date: 2025  Treatment plan discussed with: patient        Subjective Evaluation    History of Present Illness  Mechanism of injury: Pt reports that his back hurts everyday; denies any radicular symptoms. His L shoulder is starting to hurt everyday. He was doing PT and his shoulder started to get better so he figured he didn't have to come anymore but pain has gotten worse since stopping. Pt reports that he back hurts when he wakes up and when he is working such as bending over to  his tool bag. Pt works highly physically demanding job.       Pt denies any red flag  Quality of life: good    Patient Goals  Patient goals for therapy: decreased pain, increased strength and increased motion    Pain  Current pain ratin  At best pain ratin  At worst pain ratin  Quality: dull ache (constant; shoulder (weak))  Relieving factors: rest  Aggravating factors: lifting (back: stand from sitting, get out of bed, lean over to do anything, lift item from floor)  Progression: worsening    Social Support    Employment status: working  Hand dominance: right      Diagnostic Tests  MRI studies: abnormal ( MRI)  Treatments  Previous treatment: physical therapy  Current  treatment: physical therapy        Objective     Active Range of Motion   Left Shoulder   Normal active range of motion    Right Shoulder   Normal active range of motion    Lumbar   Flexion:  WFL  Extension:  WFL  Left lateral flexion:  WFL  Right lateral flexion:  WFL  Left rotation:  WFL  Right rotation:  WFL    Strength/Myotome Testing     Left Shoulder     Planes of Motion   Flexion: 4+   Extension: 4+   Abduction: 4+   External rotation at 0°: 3 (Pain)   Internal rotation at 0°: 4+     Right Shoulder     Planes of Motion   Flexion: 4+   Extension: 4+   Abduction: 4+   External rotation at 0°: 4+   Internal rotation at 0°: 4+     Left Hip   Planes of Motion   Flexion: 4-    Right Hip   Planes of Motion   Flexion: 4-    Left Knee   Flexion: 4-  Extension: 4    Right Knee   Flexion: 3+  Extension: 4    Left Ankle/Foot   Plantar flexion: 4-    Right Ankle/Foot   Plantar flexion: 4-    General Comments:      Cervical/Thoracic Comments  Mechanical assessment  Cervical retraction- no change  Cervical retraction with self OP- stronger but no change in pain  Cervical retraction with PT OP- pain less intense, increased strength  CTJ: cervical retraction with extension- pain more localized and and strength improved  Thoracic extension: symptoms localized, still pain  Repeated shoulder extension- decreased pain, improved strength - GIVEN FOR HEP             Precautions: N/a      Manuals 6/11/25                                                                Neuro Re-Ed                                                                                                        Ther Ex              Mechanical assessment             Repeated shoulder extension             KALEB                                                                             Ther Activity              Pt education                         Gait Training                                       Modalities

## 2025-06-11 NOTE — PROGRESS NOTES
OT Re-Evaluation     Today's date: 2025  Patient name: José Miguel Lazaro  : 1975  MRN: [id number]  Referring provider: Aldair Boyce DO  Dx:   Encounter Diagnosis     ICD-10-CM    1. Tenosynovitis, de Quervain  M65.4             Start Time: 07  Stop Time: 0830  Total time in clinic (min): 45 minutes    Assessment  Impairments: abnormal or restricted ROM, impaired physical strength, lacks appropriate home exercise program and pain with function  Symptom irritability: moderate  Understanding of Dx/Px/POC: good     Prognosis: good    Goals  Short term goals 2-4 weeks  Establish HEP to enhance performance with ADLs. MET    In crease  strength by 10 lbs. to enhance gripping hand tools. MET     Reduce pain by 2-4 on VAS scale with use and activity.         Long Term goals by discharge  Establish final home exercise program to enhance maximal functional level with ADLs.    Increase strength to contralateral side by time of d/c.          Plan  Patient would benefit from: OT eval, skilled occupational therapy, orthotics and custom splinting  Planned modality interventions: thermotherapy: hydrocollator packs, cryotherapy, TENS, unattended electrical stimulation, ultrasound and electrical stimulation/Russian stimulation    Planned therapy interventions: manual therapy, joint mobilization, strengthening, stretching, therapeutic activities, therapeutic exercise, home exercise program, graded exercise, graded activity, functional ROM exercises, flexibility, orthotic fitting/training, IASTM and kinesiology taping    Frequency: 1-2x week  Duration in weeks: 6  Plan of Care beginning date: 2025  Plan of Care expiration date: 2025  Treatment plan discussed with: patient      Subjective Evaluation    History of Present Illness  Mechanism of injury: Patient is a 49 y.o. RHD male who presents for OT IE and treatment for b/l wrist pain. Patient reports pain is not always constant and has been on and off for  years. Patient notes no numbness or tingling in the hands. Patient notes pain is worse after a day of working with the hands. Patient referred by Dr. Urbina to initiate treatment including hand therapy.    Patient Goals  Patient goals for therapy: increased strength, independence with ADLs/IADLs, return to sport/leisure activities, decreased pain and increased motion (Working out at the gym)    Pain  Current pain ratin  At best pain ratin  At worst pain rating: 10  Quality: dull ache and throbbing  Aggravating factors: lifting    Social Support    Working: appliance .  Hand dominance: right        Objective     Active Range of Motion     Left Wrist   Wrist flexion: 76 degrees   Wrist extension: 70 degrees   Radial deviation: 16 degrees   Ulnar deviation: 28 degrees     Right Wrist   Wrist flexion: 70 degrees   Wrist extension: 75 degrees   Radial deviation: 10 degrees   Ulnar deviation: 29 degrees     Strength/Myotome Testing     Left Wrist/Hand   Wrist extension: 4+  Wrist flexion: 4+     (2nd hand position)     Trial 1: 110    Thumb Strength  Key/Lateral Pinch     Trial 1: 20  Palmar/Three-Point Pinch     Trial 1: 20    Right Wrist/Hand   Wrist extension: 5  Wrist flexion: 5     (2nd hand position)     Trial 1: 120    Thumb Strength   Key/Lateral Pinch     Trial 1: 20  Palmar/Three-Point Pinch     Trial 1: 20               Assessment:   Patient tolerated session well able to perform TE with good activity tolerance and moderate complaints of pain in the L wrist crease. Patient re-evaluation done this this date with noted improvement in  strength however has new onset of irritation and pain on radial aspect of L wrist compared to contralateral side. Patient has achieved short term goals and is making good progress towards long term goals but has continued high pain rating on verbal report scale. Patient session focused on patient education on anatomy and physiology concerning current dx,  techniques for decreasing deficits through HEP, and appropriate use of modalities. Patient educated on HEP to include bracing and stretching with verbal instructions and handouts for patient reference to purchase wrist widget brace to wear when performing repetitive activities during the work day which have caused irritation. Patient educated on treatment plan at this time. Patient would benefit from continued skilled OT to maximize ROM and strength at this time to improve functional use with ADLs.         Plan:   Focus on ROM to improve ability to complete daily activites with ease.  POC 6/11/2025 - 7/23/2025     Auth Tracker  Auth Status Total   Visits  Auth start date Expiration date Co-Insurance   Approved 12 3/13/25 6/13/25                                    Visit Tracker  Date 3/13  IE 3/27 4/3 4/10 4/17 4/24    6/11  RE     X                                                                      PMHx:   has a past medical history of Anemia and GERD (gastroesophageal reflux disease).    Precautions:   For all exercises and treatment interventions listed below; patient educated to complete within tolerance and pain threshold. Patient educated if symptoms increase or pain becomes intolerable they should discontinue and/or reduce intensity/frequency.     Manuals HEP 6/11/2025                       Ther Ex     Education on HEP and dx  x5min   STM/gentle PROM L wrist  X4 min        Prayer stretch  3 x 10 sec    Ktape widget  X5min  b/l hands   FDC stretch  5x5sec   PROM wrist flex/ext x 5x 5 sec   Wrist PREs  3lbs x10 reps b/l 2 sets (downgraded)   Flex bar pro/sup/flex/ext/twists  Green [id number] b/l 2 sets   Pronator with dumbbell  1lbs [id number] b/l 2 sets (downgraded)   Digit extension with rubber band  Red [id number] b/l   Towel crawls with wrist ext   X5    measurements  X8 min         Ther Act                     Modalities     MHP     ESTM  X10min b/l hand (NP)

## 2025-06-17 ENCOUNTER — OFFICE VISIT (OUTPATIENT)
Dept: PHYSICAL THERAPY | Facility: CLINIC | Age: 50
End: 2025-06-17
Payer: COMMERCIAL

## 2025-06-17 DIAGNOSIS — M25.552 LEFT HIP PAIN: ICD-10-CM

## 2025-06-17 DIAGNOSIS — M54.50 CHRONIC BILATERAL LOW BACK PAIN WITHOUT SCIATICA: ICD-10-CM

## 2025-06-17 DIAGNOSIS — M25.512 ACUTE PAIN OF LEFT SHOULDER: Primary | ICD-10-CM

## 2025-06-17 DIAGNOSIS — G89.29 CHRONIC BILATERAL LOW BACK PAIN WITHOUT SCIATICA: ICD-10-CM

## 2025-06-17 PROCEDURE — 97112 NEUROMUSCULAR REEDUCATION: CPT | Performed by: PHYSICAL THERAPIST

## 2025-06-17 NOTE — PROGRESS NOTES
Daily Note     Today's date: 2025  Patient name: José Miguel Lazaro  : 1975  MRN: [id number]  Referring provider: Aldair Boyce DO  Dx:   Encounter Diagnosis     ICD-10-CM    1. Acute pain of left shoulder  M25.512       2. Chronic bilateral low back pain without sciatica  M54.50     G89.29       3. Left hip pain  M25.552                      Subjective: Pt reports that he continues to have pain in his back and shoulder. States that after last session he noticed no improvements in symptoms. He states that his back feels stiff and tight.       Objective: See treatment diary below      Assessment: Tolerated treatment well. Patient has poor posture awareness and required constant cues to correct posture. 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 Pt responded well to lumbosacral stabilization exercises. Pt will continue to benefit from skilled physical therapy in order to maximize strength and improve quality of movement. Pt had to end session early due to work.       Plan: Continue per plan of care.      Precautions: N/a      Manuals 25                                                               Neuro Re-Ed                            KALEB x 5 ni change in symptoms              Repeated knee to chest stretch SL and DL x 10 ea.              Pallof press x 20 B              TA iso x 10 5s hold cue required              TA + bridge x 10 5s hold cues required              HS stretch x 10 5s hold     Piriformis stretch x 10 5s hold     LTR x 20                Ther Ex              Mechanical assessment             Repeated shoulder extension             KALEB                                                                             Ther Activity              Pt education                         Gait Training                                       Modalities

## 2025-06-24 ENCOUNTER — OFFICE VISIT (OUTPATIENT)
Dept: PHYSICAL THERAPY | Facility: CLINIC | Age: 50
End: 2025-06-24
Payer: COMMERCIAL

## 2025-06-24 DIAGNOSIS — M54.50 CHRONIC BILATERAL LOW BACK PAIN WITHOUT SCIATICA: ICD-10-CM

## 2025-06-24 DIAGNOSIS — M25.512 ACUTE PAIN OF LEFT SHOULDER: Primary | ICD-10-CM

## 2025-06-24 DIAGNOSIS — G89.29 CHRONIC BILATERAL LOW BACK PAIN WITHOUT SCIATICA: ICD-10-CM

## 2025-06-24 DIAGNOSIS — M25.552 LEFT HIP PAIN: ICD-10-CM

## 2025-06-24 PROCEDURE — 97140 MANUAL THERAPY 1/> REGIONS: CPT

## 2025-06-24 PROCEDURE — 97110 THERAPEUTIC EXERCISES: CPT

## 2025-06-24 NOTE — PROGRESS NOTES
Daily Note     Today's date: 2025  Patient name: José Miguel Lazaro  : 1975  MRN: [id number]  Referring provider: Aldair Boyce DO  Dx:   Encounter Diagnosis     ICD-10-CM    1. Acute pain of left shoulder  M25.512       2. Chronic bilateral low back pain without sciatica  M54.50     G89.29       3. Left hip pain  M25.552             Start Time: 07  Stop Time: 08  Total time in clinic (min): 46 minutes    Subjective: Pt reports pain with forward bending at 25% ROM. Pt reports L shoulder feels weak but since he only just woke up it is not in high levels of pain.      Objective: See treatment diary below      Mechanical assessment:   Repeated lumbar flexion in supine with self OP- patient decreased  Repeated lumbar flexion with PT OP- symptoms abolished Lumbar flexion to 75% ROM    Assessment: Tolerated treatment well.  Pt will continue to benefit from skilled physical therapy in order to maximize strength and improve quality of movement. Mechanical assessment perform; pt finds relief with repeated lumbar flexion. Educated to assess at home if this is temporary relief of permanent. Would like to transition into extension if tolerated nv. Pt did not find significant relief from traction. Performed myofascial distraction as well at unilateral P-A grade 3 mobs to L2-L5      Plan: Continue per plan of care.      Precautions: N/a      Manuals 25                                Lumbar paraspinal myofacial distraction        P-A grade 3 L2-L5             Neuro Re-Ed                  KALEB x 5 ni change in symptoms         Repeated knee to chest stretch SL and DL x 10 ea.         Pallof press x 20 B         TA iso x 10 5s hold cue required         TA + bridge x 10 5s hold cues required         HS stretch x 10 5s hold     Piriformis stretch x 10 5s hold     LTR x 20           Ther Ex         Mechanical assessment        Repeated shoulder extension  Repeated shoulder extension with IR x10       KALEB  PPU- no change  PPU with self OP- no change  PPU with PT OP- no change  Repeated flexion with self OP- decreased symtpoms  Repeated flexion with PT OP- Symptoms abolished        Traction:  Max: 50  Min: 20  10 mins                                      Ther Activity         Pt education  PT education of traction and other options for treatment             Gait Training                        Modalities

## 2025-06-30 ENCOUNTER — OFFICE VISIT (OUTPATIENT)
Dept: PHYSICAL THERAPY | Facility: CLINIC | Age: 50
End: 2025-06-30
Payer: COMMERCIAL

## 2025-06-30 DIAGNOSIS — M25.512 ACUTE PAIN OF LEFT SHOULDER: Primary | ICD-10-CM

## 2025-06-30 DIAGNOSIS — M25.552 LEFT HIP PAIN: ICD-10-CM

## 2025-06-30 DIAGNOSIS — M54.50 CHRONIC BILATERAL LOW BACK PAIN WITHOUT SCIATICA: ICD-10-CM

## 2025-06-30 DIAGNOSIS — G89.29 CHRONIC BILATERAL LOW BACK PAIN WITHOUT SCIATICA: ICD-10-CM

## 2025-06-30 PROCEDURE — 97110 THERAPEUTIC EXERCISES: CPT

## 2025-06-30 NOTE — PROGRESS NOTES
Daily Note     Today's date: 2025  Patient name: José Miguel Lazaro  : 1975  MRN: [id number]  Referring provider: Aldair Boyce DO  Dx:   Encounter Diagnosis     ICD-10-CM    1. Acute pain of left shoulder  M25.512       2. Chronic bilateral low back pain without sciatica  M54.50     G89.29       3. Left hip pain  M25.552               Start Time: 0745  Stop Time: 0830  Total time in clinic (min): 45 minutes    Subjective: Pt reports low back stiffness every morning. Pt denies compliance with HEP.       Objective: See treatment diary below        Assessment: Tolerated treatment fair.  Prognosis fair due to noncompliance with HEP. Pt educated on importance of HEP and inability to see significant results without it. Discussed other avenues for treatment and agreed upon trialing dry needling. Intro of child's pose and cat/cow as well as reinforced repeated flexion as pt responds well. Pt verbally agreeable to performing HEP.       Plan: Continue per plan of care.      Precautions: N/a      Manuals 25                               Lumbar paraspinal myofacial distraction Performed       P-A grade 3 L2-L5 Performed            Neuro Re-Ed                  KALEB x 5 ni change in symptoms         Repeated knee to chest stretch SL and DL x 10 ea.         Pallof press x 20 B   Paloff #10.5 3x10 ea.       TA iso x 10 5s hold cue required         TA + bridge x 10 5s hold cues required         HS stretch x 10 5s hold     Piriformis stretch x 10 5s hold     LTR x 20           Ther Ex         Mechanical assessment        Repeated shoulder extension  Repeated shoulder extension with IR x10      KALEB  PPU- no change  PPU with self OP- no change  PPU with PT OP- no change  Repeated flexion with self OP- decreased symtpoms  Repeated flexion with PT OP- Symptoms abolished Repeated flexion in lying        Traction:  Max: 50  Min: 20  10 mins          Cat cow x20        Jose pose with lateral  "bending 10x5\"                    Ther Activity         Pt education  PT education of traction and other options for treatment Discussion to dry needle.            Gait Training                        Modalities                             "

## 2025-07-07 ENCOUNTER — OFFICE VISIT (OUTPATIENT)
Dept: PHYSICAL THERAPY | Facility: CLINIC | Age: 50
End: 2025-07-07
Payer: COMMERCIAL

## 2025-07-07 DIAGNOSIS — M54.50 CHRONIC BILATERAL LOW BACK PAIN WITHOUT SCIATICA: Primary | ICD-10-CM

## 2025-07-07 DIAGNOSIS — G89.29 CHRONIC BILATERAL LOW BACK PAIN WITHOUT SCIATICA: Primary | ICD-10-CM

## 2025-07-07 PROCEDURE — 97140 MANUAL THERAPY 1/> REGIONS: CPT

## 2025-07-07 PROCEDURE — 97530 THERAPEUTIC ACTIVITIES: CPT

## 2025-07-07 NOTE — PROGRESS NOTES
Daily Note     Today's date: 2025  Patient name: José Miguel Lazaro  : 1975  MRN: [id number]  Referring provider: Aldair Boyce DO  Dx:   Encounter Diagnosis     ICD-10-CM    1. Chronic bilateral low back pain without sciatica  M54.50     G89.29           Start Time: 0745  Stop Time: 0830  Total time in clinic (min): 45 minutes    Subjective: Reports that low back is not too bad today.       Objective: See treatment diary below      Assessment: Tolerated treatment well. Patient would benefit from continued PT in order to down train central nervous system. Educated patient on dry needling.  Dry needling consent for reviewed and signed by patient. Pt educated on dry needling to include but not limited to: risks/benefits/aftercare instructions. Provided with educational handout on DN. All questions and concerns answered and addressed.   Pt verbally consented to dry needling treatment session. Risks/benefits/aftercare instructions reviewed. All questions/concerns addressed.  Pt in prone position. Hand hygiene performed pre and post DN treatment session. Placement of needles in pathological tissue.   Lumbar erectors, sacrum, glute med (needle location).  Total treatment duration to include set up/break down/in situ: 40 minutes. Patient was supervised by clinician throughout entirety of treatment session to include when DN in situ. All needles counted upon insertion and removal-- 26 needles. All accounted for and disposed in appropriate sharp container.     No adverse reaction to treatment. Pt advised may experience muscular fatigue and soreness. Pt verbalized understanding.          Plan: Continue per plan of care.      Precautions: N/a      Manuals 25                              Lumbar paraspinal myofacial distraction Performed DN-see above      P-A grade 3 L2-L5 Performed            Neuro Re-Ed                  KALEB x 5 ni change in symptoms         Repeated knee to  "chest stretch SL and DL x 10 ea.         Pallof press x 20 B   Paloff #10.5 3x10 ea.       TA iso x 10 5s hold cue required         TA + bridge x 10 5s hold cues required         HS stretch x 10 5s hold     Piriformis stretch x 10 5s hold     LTR x 20           Ther Ex         Mechanical assessment        Repeated shoulder extension  Repeated shoulder extension with IR x10      KALEB  PPU- no change  PPU with self OP- no change  PPU with PT OP- no change  Repeated flexion with self OP- decreased symtpoms  Repeated flexion with PT OP- Symptoms abolished Repeated flexion in lying        Traction:  Max: 50  Min: 20  10 mins          Cat cow x20        Jose pose with lateral bending 10x5\"                    Ther Activity         Pt education  PT education of traction and other options for treatment Discussion to dry needle.            Gait Training                        Modalities                               "

## 2025-07-18 ENCOUNTER — OFFICE VISIT (OUTPATIENT)
Dept: PHYSICAL THERAPY | Facility: CLINIC | Age: 50
End: 2025-07-18
Payer: COMMERCIAL

## 2025-07-18 DIAGNOSIS — M25.552 LEFT HIP PAIN: ICD-10-CM

## 2025-07-18 DIAGNOSIS — M25.512 ACUTE PAIN OF LEFT SHOULDER: ICD-10-CM

## 2025-07-18 DIAGNOSIS — G89.29 CHRONIC BILATERAL LOW BACK PAIN WITHOUT SCIATICA: Primary | ICD-10-CM

## 2025-07-18 DIAGNOSIS — M54.50 CHRONIC BILATERAL LOW BACK PAIN WITHOUT SCIATICA: Primary | ICD-10-CM

## 2025-07-18 PROCEDURE — 97112 NEUROMUSCULAR REEDUCATION: CPT

## 2025-07-18 PROCEDURE — 97140 MANUAL THERAPY 1/> REGIONS: CPT

## 2025-07-18 NOTE — PROGRESS NOTES
PT Re-Evaluation     Today's date: 2025  Patient name: José Miguel Lazaro  : 1975  MRN: [id number]  Referring provider: Aldair Boyce DO  Dx:   Encounter Diagnosis     ICD-10-CM    1. Chronic bilateral low back pain without sciatica  M54.50     G89.29       2. Acute pain of left shoulder  M25.512       3. Left hip pain  M25.552                        Assessment  Impairments: abnormal gait, abnormal muscle firing, abnormal muscle tone, abnormal or restricted ROM, activity intolerance, impaired balance, impaired physical strength, lacks appropriate home exercise program, pain with function, poor posture , poor body mechanics and endurance    Assessment details: Pt would benefit from continued PT as he continues to have shoulder pain with reduced L ER strength. Utilized repeated shoulder etension in IR to address pain with ER and pt reports symptoms abolished. Given repeated shoulder extension as HEP again and encouraged pt to utilize to prevent pain. Provided pt with GTB to perform shoulder ER. Extend POC 4 weeks.    Understanding of Dx/Px/POC: good     Prognosis: fair    Goals  Short Term Goals (4 weeks)  1. Pain free squat to lift things from the ground for improved QoL and work tasks- Have not performed  2. Pt will >4/5 ER strength in LUE-MET  3.  Pt will be able to ambulate >1000 ft with <3/10 pain for community ambulation-MET    Long term goals (8 weeks)  1. Pt will improve FOTO by 10 to meet MDC- Not assessed  2. Pt will report >75% improement in function- Not MET  3. PT will deny difficulty sleeping-MET    Plan  Patient would benefit from: skilled physical therapy  Planned modality interventions: biofeedback and TENS  Other planned modality interventions: dry needling, traction, cupping    Planned therapy interventions: joint mobilization, kinesiology taping, manual therapy, nerve gliding, neuromuscular re-education, postural training, strengthening, stretching, home exercise program, functional  ROM exercises, flexibility, body mechanics training, behavior modification, balance, activity modification, abdominal trunk stabilization and breathing training    Frequency: 1-2x week  Duration in weeks: 8  Plan of Care beginning date: 2025  Plan of Care expiration date: 8/15/2025  Treatment plan discussed with: patient        Subjective Evaluation    History of Present Illness  Mechanism of injury: Pt has been seen in [facility]PPT since 25. Pt reports moderate compliance with HEP. Pt reports his symptoms vary. Pt reports that he wakes up stiff in the morning. Pt reports after driving for a few hours when he gets out he feels his pain. Pt reports this week the back has been okay. Pt reports his shoulder has been his greatest concern. When he lifts he gets shoulder pain and needs to be able to do this for work.  Quality of life: good    Patient Goals  Patient goals for therapy: decreased pain, increased strength and increased motion    Pain  Current pain ratin  At best pain ratin  At worst pain ratin  Quality: dull ache (constant; shoulder (weak))  Relieving factors: rest  Aggravating factors: lifting (back: stand from sitting, get out of bed, lean over to do anything, lift item from floor)  Progression: worsening    Social Support    Employment status: working  Hand dominance: right      Diagnostic Tests  MRI studies: abnormal ( MRI)  Treatments  Previous treatment: physical therapy  Current treatment: physical therapy        Objective     Active Range of Motion   Left Shoulder   Normal active range of motion    Right Shoulder   Normal active range of motion    Lumbar   Flexion:  WFL  Extension:  WFL  Left lateral flexion:  WFL  Right lateral flexion:  WFL  Left rotation:  WFL  Right rotation:  WFL    Strength/Myotome Testing     Left Shoulder     Planes of Motion   Flexion: 4+   Extension: 5   Abduction: 5   External rotation at 0°: 4- (Pain)   Internal rotation at 0°: 5     Right Shoulder  "    Planes of Motion   Flexion: 4+   Extension: 4+   Abduction: 5   External rotation at 0°: 4+   Internal rotation at 0°: 5     Left Hip   Planes of Motion   Flexion: 4-    Right Hip   Planes of Motion   Flexion: 4-    Left Knee   Flexion: 4-  Extension: 4    Right Knee   Flexion: 3+  Extension: 4    Left Ankle/Foot   Plantar flexion: 4-    Right Ankle/Foot   Plantar flexion: 4-    General Comments:      Cervical/Thoracic Comments  Mechanical assessment  Cervical retraction- no change  Cervical retraction with self OP- stronger but no change in pain  Cervical retraction with PT OP- pain less intense, increased strength  CTJ: cervical retraction with extension- pain more localized and and strength improved  Thoracic extension: symptoms localized, still pain  Repeated shoulder extension- decreased pain, improved strength - GIVEN FOR HEP             Precautions: N/a        Manuals 6/11/25 06/17/25 6/24/25 6/30/25 7/7/25 7/18/25                                 Lumbar paraspinal myofacial distraction Performed DN-see above       P-A grade 3 L2-L5 Performed           Subscap pin and stretch         Pec cross friction tissue mob   Neuro Re-Ed                    KALEB x 5 ni change in symptoms          Repeated knee to chest stretch SL and DL x 10 ea.          Pallof press x 20 B   Paloff #10.5 3x10 ea.        TA iso x 10 5s hold cue required          TA + bridge x 10 5s hold cues required          HS stretch x 10 5s hold     Piriformis stretch x 10 5s hold     LTR x 20                  ER iso in seated postiion wit GTB 20x3\"         Sidelying ER with PT tactile cues for proper scap positioning 3x10    Ther Ex          Mechanical assessment         Repeated shoulder extension  Repeated shoulder extension with IR x10       KALEB  PPU- no change  PPU with self OP- no change  PPU with PT OP- no change  Repeated flexion with self OP- decreased symtpoms  Repeated flexion with PT OP- Symptoms abolished Repeated flexion in lying    " "     Traction:  Max: 50  Min: 20  10 mins           Cat cow x20         Jose pose with lateral bending 10x5\"                       Ther Activity          Pt education  PT education of traction and other options for treatment Discussion to dry needle.  Discussed POC, HEP    Repeated shoulder extension in IR            Gait Training                           Modalities                               "

## 2025-07-28 ENCOUNTER — OFFICE VISIT (OUTPATIENT)
Dept: PHYSICAL THERAPY | Facility: CLINIC | Age: 50
End: 2025-07-28
Payer: COMMERCIAL

## 2025-07-28 DIAGNOSIS — M25.552 LEFT HIP PAIN: ICD-10-CM

## 2025-07-28 DIAGNOSIS — M25.512 ACUTE PAIN OF LEFT SHOULDER: ICD-10-CM

## 2025-07-28 DIAGNOSIS — M54.50 CHRONIC BILATERAL LOW BACK PAIN WITHOUT SCIATICA: Primary | ICD-10-CM

## 2025-07-28 DIAGNOSIS — G89.29 CHRONIC BILATERAL LOW BACK PAIN WITHOUT SCIATICA: Primary | ICD-10-CM

## 2025-07-28 PROCEDURE — 97110 THERAPEUTIC EXERCISES: CPT

## 2025-08-12 ENCOUNTER — OFFICE VISIT (OUTPATIENT)
Dept: PHYSICAL THERAPY | Facility: CLINIC | Age: 50
End: 2025-08-12
Payer: COMMERCIAL